# Patient Record
Sex: MALE | Race: ASIAN | Employment: OTHER
[De-identification: names, ages, dates, MRNs, and addresses within clinical notes are randomized per-mention and may not be internally consistent; named-entity substitution may affect disease eponyms.]

---

## 2019-02-07 PROBLEM — M54.50 CHRONIC LEFT-SIDED LOW BACK PAIN WITHOUT SCIATICA: Status: ACTIVE | Noted: 2019-02-07

## 2019-02-07 PROBLEM — G43.909 MIGRAINE WITHOUT STATUS MIGRAINOSUS, NOT INTRACTABLE: Status: ACTIVE | Noted: 2019-02-07

## 2019-02-07 PROBLEM — M54.41 CHRONIC RIGHT-SIDED LOW BACK PAIN WITH RIGHT-SIDED SCIATICA: Status: ACTIVE | Noted: 2019-02-07

## 2019-02-07 PROBLEM — G89.29 CHRONIC RIGHT-SIDED LOW BACK PAIN WITH RIGHT-SIDED SCIATICA: Status: ACTIVE | Noted: 2019-02-07

## 2019-02-07 PROBLEM — F33.2 SEVERE EPISODE OF RECURRENT MAJOR DEPRESSIVE DISORDER, WITHOUT PSYCHOTIC FEATURES (HCC): Status: ACTIVE | Noted: 2019-02-07

## 2019-02-25 ENCOUNTER — HOSPITAL ENCOUNTER (OUTPATIENT)
Dept: MRI IMAGING | Facility: CLINIC | Age: 34
Discharge: HOME | End: 2019-02-27
Payer: OTHER GOVERNMENT

## 2019-02-25 DIAGNOSIS — G89.29 CHRONIC LEFT-SIDED LOW BACK PAIN WITHOUT SCIATICA: ICD-10-CM

## 2019-02-25 DIAGNOSIS — M54.41 CHRONIC RIGHT-SIDED LOW BACK PAIN WITH RIGHT-SIDED SCIATICA: ICD-10-CM

## 2019-02-25 DIAGNOSIS — M54.50 CHRONIC LEFT-SIDED LOW BACK PAIN WITHOUT SCIATICA: ICD-10-CM

## 2019-02-25 DIAGNOSIS — M54.16 LUMBAR RADICULOPATHY, RIGHT: ICD-10-CM

## 2019-02-25 DIAGNOSIS — G89.29 CHRONIC RIGHT-SIDED LOW BACK PAIN WITH RIGHT-SIDED SCIATICA: ICD-10-CM

## 2019-02-25 DIAGNOSIS — G43.909 MIGRAINE WITHOUT STATUS MIGRAINOSUS, NOT INTRACTABLE, UNSPECIFIED MIGRAINE TYPE: ICD-10-CM

## 2019-02-25 PROCEDURE — 72148 MRI LUMBAR SPINE W/O DYE: CPT

## 2019-02-25 PROCEDURE — 70551 MRI BRAIN STEM W/O DYE: CPT

## 2019-03-04 PROBLEM — E66.3 OVERWEIGHT (BMI 25.0-29.9): Status: ACTIVE | Noted: 2019-03-04

## 2019-04-22 DIAGNOSIS — M25.562 ACUTE PAIN OF BOTH KNEES: Primary | ICD-10-CM

## 2019-04-22 DIAGNOSIS — M25.561 ACUTE PAIN OF BOTH KNEES: Primary | ICD-10-CM

## 2019-04-23 ENCOUNTER — OFFICE VISIT (OUTPATIENT)
Dept: ORTHOPEDIC SURGERY | Age: 34
End: 2019-04-23
Payer: OTHER GOVERNMENT

## 2019-04-23 DIAGNOSIS — M25.562 CHRONIC PATELLOFEMORAL PAIN OF BOTH KNEES: Primary | ICD-10-CM

## 2019-04-23 DIAGNOSIS — G89.29 CHRONIC PATELLOFEMORAL PAIN OF BOTH KNEES: Primary | ICD-10-CM

## 2019-04-23 DIAGNOSIS — M25.561 CHRONIC PATELLOFEMORAL PAIN OF BOTH KNEES: Primary | ICD-10-CM

## 2019-04-23 PROCEDURE — 99203 OFFICE O/P NEW LOW 30 MIN: CPT | Performed by: ORTHOPAEDIC SURGERY
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
## 2019-06-19 ENCOUNTER — APPOINTMENT (OUTPATIENT)
Dept: GENERAL RADIOLOGY | Age: 34
DRG: 918 | End: 2019-06-19
Payer: OTHER GOVERNMENT

## 2019-06-19 ENCOUNTER — APPOINTMENT (OUTPATIENT)
Dept: CT IMAGING | Age: 34
DRG: 918 | End: 2019-06-19
Payer: OTHER GOVERNMENT

## 2019-06-19 ENCOUNTER — HOSPITAL ENCOUNTER (INPATIENT)
Age: 34
LOS: 5 days | Discharge: PSYCHIATRIC OR SUBSTANCE USE FACILITY | DRG: 918 | End: 2019-06-24
Attending: EMERGENCY MEDICINE | Admitting: FAMILY MEDICINE
Payer: OTHER GOVERNMENT

## 2019-06-19 DIAGNOSIS — I47.1 SVT (SUPRAVENTRICULAR TACHYCARDIA) (HCC): ICD-10-CM

## 2019-06-19 DIAGNOSIS — G25.79 SEROTONIN SYNDROME: Primary | ICD-10-CM

## 2019-06-19 PROBLEM — G90.81 SEROTONIN SYNDROME: Status: ACTIVE | Noted: 2019-06-19

## 2019-06-19 LAB
ABSOLUTE EOS #: 0.1 K/UL (ref 0–0.4)
ABSOLUTE IMMATURE GRANULOCYTE: ABNORMAL K/UL (ref 0–0.3)
ABSOLUTE LYMPH #: 2.7 K/UL (ref 1–4.8)
ABSOLUTE MONO #: 0.6 K/UL (ref 0.1–1.3)
ACETAMINOPHEN LEVEL: <5 UG/ML (ref 10–30)
ALBUMIN SERPL-MCNC: 4.7 G/DL (ref 3.5–5.2)
ALBUMIN/GLOBULIN RATIO: NORMAL (ref 1–2.5)
ALLEN TEST: ABNORMAL
ALP BLD-CCNC: 51 U/L (ref 40–129)
ALT SERPL-CCNC: 38 U/L (ref 5–41)
AMPHETAMINE SCREEN URINE: NEGATIVE
ANION GAP SERPL CALCULATED.3IONS-SCNC: 15 MMOL/L (ref 9–17)
AST SERPL-CCNC: 27 U/L
BARBITURATE SCREEN URINE: NEGATIVE
BASOPHILS # BLD: 1 % (ref 0–2)
BASOPHILS ABSOLUTE: 0 K/UL (ref 0–0.2)
BENZODIAZEPINE SCREEN, URINE: NEGATIVE
BILIRUB SERPL-MCNC: 0.65 MG/DL (ref 0.3–1.2)
BILIRUBIN URINE: NEGATIVE
BUN BLDV-MCNC: 11 MG/DL (ref 6–20)
BUN/CREAT BLD: NORMAL (ref 9–20)
BUPRENORPHINE URINE: NORMAL
CALCIUM SERPL-MCNC: 9.5 MG/DL (ref 8.6–10.4)
CANNABINOID SCREEN URINE: NEGATIVE
CARBOXYHEMOGLOBIN: 0.7 % (ref 0–5)
CHLORIDE BLD-SCNC: 104 MMOL/L (ref 98–107)
CO2: 22 MMOL/L (ref 20–31)
COCAINE METABOLITE, URINE: NEGATIVE
COLOR: YELLOW
COMMENT UA: NORMAL
CREAT SERPL-MCNC: 0.72 MG/DL (ref 0.7–1.2)
DIFFERENTIAL TYPE: ABNORMAL
EOSINOPHILS RELATIVE PERCENT: 2 % (ref 0–4)
ETHANOL PERCENT: <0.01 %
ETHANOL: <10 MG/DL
FIO2: ABNORMAL
GFR AFRICAN AMERICAN: >60 ML/MIN
GFR NON-AFRICAN AMERICAN: >60 ML/MIN
GFR SERPL CREATININE-BSD FRML MDRD: NORMAL ML/MIN/{1.73_M2}
GFR SERPL CREATININE-BSD FRML MDRD: NORMAL ML/MIN/{1.73_M2}
GLUCOSE BLD-MCNC: 90 MG/DL (ref 70–99)
GLUCOSE URINE: NEGATIVE
HCO3 ARTERIAL: 22.8 MMOL/L (ref 22–26)
HCT VFR BLD CALC: 47.5 % (ref 41–53)
HEMOGLOBIN: 16.4 G/DL (ref 13.5–17.5)
IMMATURE GRANULOCYTES: ABNORMAL %
INR BLD: 0.9
KETONES, URINE: NEGATIVE
LACTIC ACID: 2.3 MMOL/L (ref 0.5–2.2)
LEUKOCYTE ESTERASE, URINE: NEGATIVE
LYMPHOCYTES # BLD: 37 % (ref 24–44)
MCH RBC QN AUTO: 29.4 PG (ref 26–34)
MCHC RBC AUTO-ENTMCNC: 34.4 G/DL (ref 31–37)
MCV RBC AUTO: 85.3 FL (ref 80–100)
MDMA URINE: NORMAL
METHADONE SCREEN, URINE: NEGATIVE
METHAMPHETAMINE, URINE: NORMAL
METHEMOGLOBIN: 0.6 % (ref 0–1.9)
MODE: ABNORMAL
MONOCYTES # BLD: 8 % (ref 1–7)
NEGATIVE BASE EXCESS, ART: 2.1 MMOL/L (ref 0–2)
NITRITE, URINE: NEGATIVE
NOTIFICATION TIME: ABNORMAL
NOTIFICATION: ABNORMAL
NRBC AUTOMATED: ABNORMAL PER 100 WBC
O2 DEVICE/FLOW/%: ABNORMAL
O2 SAT, ARTERIAL: 96.5 % (ref 95–98)
OPIATES, URINE: NEGATIVE
OXYCODONE SCREEN URINE: NEGATIVE
OXYHEMOGLOBIN: ABNORMAL % (ref 95–98)
PARTIAL THROMBOPLASTIN TIME: 27.6 SEC (ref 24–36)
PATIENT TEMP: ABNORMAL
PCO2 ARTERIAL: 37.5 MMHG (ref 35–45)
PCO2, ART, TEMP ADJ: ABNORMAL (ref 35–45)
PDW BLD-RTO: 12.7 % (ref 11.5–14.9)
PEEP/CPAP: ABNORMAL
PH ARTERIAL: 7.39 (ref 7.35–7.45)
PH UA: 7.5 (ref 5–8)
PH, ART, TEMP ADJ: ABNORMAL (ref 7.35–7.45)
PHENCYCLIDINE, URINE: NEGATIVE
PLATELET # BLD: 294 K/UL (ref 150–450)
PLATELET ESTIMATE: ABNORMAL
PMV BLD AUTO: 8.1 FL (ref 6–12)
PO2 ARTERIAL: 88.7 MMHG (ref 80–100)
PO2, ART, TEMP ADJ: ABNORMAL MMHG (ref 80–100)
POSITIVE BASE EXCESS, ART: ABNORMAL MMOL/L (ref 0–2)
POTASSIUM SERPL-SCNC: 3.9 MMOL/L (ref 3.7–5.3)
PROPOXYPHENE, URINE: NORMAL
PROTEIN UA: NEGATIVE
PROTHROMBIN TIME: 12.6 SEC (ref 11.8–14.6)
PSV: ABNORMAL
PT. POSITION: ABNORMAL
RBC # BLD: 5.57 M/UL (ref 4.5–5.9)
RBC # BLD: ABNORMAL 10*6/UL
RESPIRATORY RATE: 18
SALICYLATE LEVEL: <1 MG/DL (ref 3–10)
SAMPLE SITE: ABNORMAL
SEG NEUTROPHILS: 52 % (ref 36–66)
SEGMENTED NEUTROPHILS ABSOLUTE COUNT: 3.9 K/UL (ref 1.3–9.1)
SET RATE: ABNORMAL
SODIUM BLD-SCNC: 141 MMOL/L (ref 135–144)
SPECIFIC GRAVITY UA: 1 (ref 1–1.03)
TEST INFORMATION: NORMAL
TEXT FOR RESPIRATORY: ABNORMAL
TOTAL CK: 110 U/L (ref 39–308)
TOTAL HB: ABNORMAL G/DL (ref 12–16)
TOTAL PROTEIN: 7.8 G/DL (ref 6.4–8.3)
TOTAL RATE: ABNORMAL
TOXIC TRICYCLIC SC,BLOOD: ABNORMAL
TRICYCLIC ANTIDEPRESSANTS, UR: NORMAL
TROPONIN INTERP: NORMAL
TROPONIN T: NORMAL NG/ML
TROPONIN, HIGH SENSITIVITY: <6 NG/L (ref 0–22)
TSH SERPL DL<=0.05 MIU/L-ACNC: 1.67 MIU/L (ref 0.3–5)
TURBIDITY: CLEAR
URINE HGB: NEGATIVE
UROBILINOGEN, URINE: NORMAL
VT: ABNORMAL
WBC # BLD: 7.3 K/UL (ref 3.5–11)
WBC # BLD: ABNORMAL 10*3/UL

## 2019-06-19 PROCEDURE — 85025 COMPLETE CBC W/AUTO DIFF WBC: CPT

## 2019-06-19 PROCEDURE — 83735 ASSAY OF MAGNESIUM: CPT

## 2019-06-19 PROCEDURE — 80307 DRUG TEST PRSMV CHEM ANLYZR: CPT

## 2019-06-19 PROCEDURE — 96365 THER/PROPH/DIAG IV INF INIT: CPT

## 2019-06-19 PROCEDURE — 85610 PROTHROMBIN TIME: CPT

## 2019-06-19 PROCEDURE — 93005 ELECTROCARDIOGRAM TRACING: CPT | Performed by: EMERGENCY MEDICINE

## 2019-06-19 PROCEDURE — 2500000003 HC RX 250 WO HCPCS: Performed by: FAMILY MEDICINE

## 2019-06-19 PROCEDURE — 84484 ASSAY OF TROPONIN QUANT: CPT

## 2019-06-19 PROCEDURE — G0480 DRUG TEST DEF 1-7 CLASSES: HCPCS

## 2019-06-19 PROCEDURE — 2580000003 HC RX 258: Performed by: EMERGENCY MEDICINE

## 2019-06-19 PROCEDURE — 36415 COLL VENOUS BLD VENIPUNCTURE: CPT

## 2019-06-19 PROCEDURE — 82805 BLOOD GASES W/O2 SATURATION: CPT

## 2019-06-19 PROCEDURE — 2060000000 HC ICU INTERMEDIATE R&B

## 2019-06-19 PROCEDURE — 71045 X-RAY EXAM CHEST 1 VIEW: CPT

## 2019-06-19 PROCEDURE — 84443 ASSAY THYROID STIM HORMONE: CPT

## 2019-06-19 PROCEDURE — 96375 TX/PRO/DX INJ NEW DRUG ADDON: CPT

## 2019-06-19 PROCEDURE — 36600 WITHDRAWAL OF ARTERIAL BLOOD: CPT

## 2019-06-19 PROCEDURE — 85730 THROMBOPLASTIN TIME PARTIAL: CPT

## 2019-06-19 PROCEDURE — 2000000000 HC ICU R&B

## 2019-06-19 PROCEDURE — 81003 URINALYSIS AUTO W/O SCOPE: CPT

## 2019-06-19 PROCEDURE — 99285 EMERGENCY DEPT VISIT HI MDM: CPT

## 2019-06-19 PROCEDURE — 82550 ASSAY OF CK (CPK): CPT

## 2019-06-19 PROCEDURE — 6360000002 HC RX W HCPCS: Performed by: EMERGENCY MEDICINE

## 2019-06-19 PROCEDURE — 80053 COMPREHEN METABOLIC PANEL: CPT

## 2019-06-19 PROCEDURE — 70450 CT HEAD/BRAIN W/O DYE: CPT

## 2019-06-19 PROCEDURE — 2580000003 HC RX 258: Performed by: FAMILY MEDICINE

## 2019-06-19 PROCEDURE — 83605 ASSAY OF LACTIC ACID: CPT

## 2019-06-19 RX ORDER — SODIUM CHLORIDE 9 MG/ML
INJECTION, SOLUTION INTRAVENOUS CONTINUOUS
Status: DISCONTINUED | OUTPATIENT
Start: 2019-06-19 | End: 2019-06-24 | Stop reason: HOSPADM

## 2019-06-19 RX ORDER — CLONIDINE HYDROCHLORIDE 0.1 MG/1
0.1 TABLET ORAL NIGHTLY PRN
Status: DISCONTINUED | OUTPATIENT
Start: 2019-06-19 | End: 2019-06-24 | Stop reason: HOSPADM

## 2019-06-19 RX ORDER — DIPHENHYDRAMINE HCL 25 MG
25 TABLET ORAL EVERY 6 HOURS PRN
Status: ON HOLD | COMMUNITY
End: 2019-06-24 | Stop reason: HOSPADM

## 2019-06-19 RX ORDER — SODIUM CHLORIDE 0.9 % (FLUSH) 0.9 %
10 SYRINGE (ML) INJECTION PRN
Status: DISCONTINUED | OUTPATIENT
Start: 2019-06-19 | End: 2019-06-24 | Stop reason: HOSPADM

## 2019-06-19 RX ORDER — CLONIDINE HYDROCHLORIDE 0.1 MG/1
0.1 TABLET ORAL NIGHTLY PRN
Status: ON HOLD | COMMUNITY
End: 2019-06-27 | Stop reason: SDUPTHER

## 2019-06-19 RX ORDER — GABAPENTIN 600 MG/1
600 TABLET ORAL 3 TIMES DAILY
Status: DISCONTINUED | OUTPATIENT
Start: 2019-06-19 | End: 2019-06-24 | Stop reason: HOSPADM

## 2019-06-19 RX ORDER — LORAZEPAM 2 MG/ML
1.5 INJECTION INTRAMUSCULAR ONCE
Status: COMPLETED | OUTPATIENT
Start: 2019-06-19 | End: 2019-06-19

## 2019-06-19 RX ORDER — MAGNESIUM SULFATE 1 G/100ML
1 INJECTION INTRAVENOUS
Status: COMPLETED | OUTPATIENT
Start: 2019-06-19 | End: 2019-06-19

## 2019-06-19 RX ORDER — LORAZEPAM 2 MG/ML
1 INJECTION INTRAMUSCULAR ONCE
Status: COMPLETED | OUTPATIENT
Start: 2019-06-19 | End: 2019-06-19

## 2019-06-19 RX ORDER — ONDANSETRON 2 MG/ML
4 INJECTION INTRAMUSCULAR; INTRAVENOUS EVERY 6 HOURS PRN
Status: DISCONTINUED | OUTPATIENT
Start: 2019-06-19 | End: 2019-06-24 | Stop reason: HOSPADM

## 2019-06-19 RX ORDER — SODIUM CHLORIDE 0.9 % (FLUSH) 0.9 %
10 SYRINGE (ML) INJECTION EVERY 12 HOURS SCHEDULED
Status: DISCONTINUED | OUTPATIENT
Start: 2019-06-19 | End: 2019-06-24 | Stop reason: HOSPADM

## 2019-06-19 RX ORDER — SUMATRIPTAN 100 MG/1
100 TABLET, FILM COATED ORAL
Status: DISPENSED | OUTPATIENT
Start: 2019-06-19 | End: 2019-06-19

## 2019-06-19 RX ORDER — 0.9 % SODIUM CHLORIDE 0.9 %
2000 INTRAVENOUS SOLUTION INTRAVENOUS ONCE
Status: COMPLETED | OUTPATIENT
Start: 2019-06-19 | End: 2019-06-19

## 2019-06-19 RX ORDER — LORAZEPAM 2 MG/ML
0.5 INJECTION INTRAMUSCULAR EVERY 6 HOURS PRN
Status: DISCONTINUED | OUTPATIENT
Start: 2019-06-19 | End: 2019-06-24 | Stop reason: HOSPADM

## 2019-06-19 RX ADMIN — Medication 10 ML: at 23:13

## 2019-06-19 RX ADMIN — SODIUM CHLORIDE: 9 INJECTION, SOLUTION INTRAVENOUS at 23:07

## 2019-06-19 RX ADMIN — LORAZEPAM 0.5 MG: 2 INJECTION INTRAMUSCULAR; INTRAVENOUS at 18:35

## 2019-06-19 RX ADMIN — LORAZEPAM 1 MG: 2 INJECTION INTRAMUSCULAR; INTRAVENOUS at 20:16

## 2019-06-19 RX ADMIN — LORAZEPAM 1.5 MG: 2 INJECTION INTRAMUSCULAR; INTRAVENOUS at 18:48

## 2019-06-19 RX ADMIN — MAGNESIUM SULFATE HEPTAHYDRATE 1 G: 1 INJECTION, SOLUTION INTRAVENOUS at 20:07

## 2019-06-19 RX ADMIN — MAGNESIUM SULFATE HEPTAHYDRATE 1 G: 1 INJECTION, SOLUTION INTRAVENOUS at 18:50

## 2019-06-19 RX ADMIN — SODIUM CHLORIDE 2000 ML: 9 INJECTION, SOLUTION INTRAVENOUS at 18:41

## 2019-06-19 RX ADMIN — DEXMEDETOMIDINE 0.2 MCG/KG/HR: 100 INJECTION, SOLUTION, CONCENTRATE INTRAVENOUS at 23:07

## 2019-06-19 ASSESSMENT — PAIN SCALES - GENERAL: PAINLEVEL_OUTOF10: 0

## 2019-06-19 NOTE — PROGRESS NOTES
Pharmacy Note:    Poison Control was contacted regarding suspected overdose on duloxetine. Patient reported having taken this medication, but could not provide a quantity that he took. He had an empty bottle of 60 capsules of duloxetine 60 mg with him upon arrival. The medication vial is over a month old (filled 5/7/19), however all of the other vials which were full or partially full were filled the same date or earlier, indicating some level of non-adherence. Poison Control suggests leaving the patient on telemetry, replacing electrolytes, starting with magnesium, given the patient's long QTc. They also suggested lorazepam to manage tachycardia, clonus, and seizures. They suggest avoiding medications which may prolong the QT interval. Du Berman RN is the nurse from [facility] on this case. She will call back in 2 hours.     Thank you,  Nicky Bonilla, PharmD  [phone]

## 2019-06-19 NOTE — PROGRESS NOTES
Medication History completed:    New medications: diphenhydramine, clonidine    Medications discontinued: mirtazapine, trazodone    Changes to dosing: duloxetine dose confirmed as 60 mg twice daily    Stated allergies: NKDA    Other pertinent information: Medications confirmed with prescription vials and [facility]. His fill history suggests non-adherence to medications.      Thank you,  Ileana Goetz PharmD  [phone]

## 2019-06-19 NOTE — ED PROVIDER NOTES
light. Right eye exhibits no discharge. Left eye exhibits no discharge. Neck: No tracheal deviation present. Cardiovascular: Regular rhythm and normal heart sounds. tachycardic   Pulmonary/Chest: Effort normal and breath sounds normal. No stridor. No respiratory distress. He has no wheezes. He has no rales. Abdominal: Soft. Bowel sounds are normal. He exhibits no distension. There is no tenderness. There is no guarding. Musculoskeletal: Normal range of motion. He exhibits no deformity. Neurological: He is disoriented. Awake. Responding to internal stimuli, mumbling nonsensical words. Patient does follow commands. SCOTT. Patient does not have muscular rigidity. Patient has marked clonus of b/l LE   Skin: Skin is warm and dry. He is not diaphoretic.    Psychiatric:   Unable to assess 2/2 ams       WORK-UP     PLAN (LABS / IMAGING /EKG):  Orders Placed This Encounter   Procedures    CT head without contrast    XR CHEST PORTABLE    CBC Auto Differential    Comprehensive Metabolic Panel w/ Reflex to MG    Troponin    Protime-INR    APTT    TSH without Reflex    Lactic Acid    Urinalysis Reflex to Culture    TOX SCR, BLD, ED    Urine Drug Screen    Arterial Blood Gases    CK    Telemetry monitoring    Inpatient consult to Primary Care Provider    EKG 12 Lead    EKG 12 Lead    Saline lock IV    PATIENT STATUS (FROM ED OR OR/PROCEDURAL) Inpatient       MEDICATIONS ORDERED:  Orders Placed This Encounter   Medications    LORazepam (ATIVAN) injection 0.5 mg    0.9 % sodium chloride bolus    magnesium sulfate 1 g in dextrose 5% 100 mL IVPB    LORazepam (ATIVAN) injection 1.5 mg    LORazepam (ATIVAN) injection 1 mg    0.9 % sodium chloride infusion       DIAGNOSTIC RESULTS / EMERGENCY DEPARTMENT COURSE /MDM / DIFFERENTIAL DIAGNOSIS     LABS:  Results for orders placed or performed during the hospital encounter of 06/19/19   CBC Auto Differential   Result Value Ref Range    WBC 7.3 3.5 - 11.0 k/uL    RBC 5.57 4.5 - 5.9 m/uL    Hemoglobin 16.4 13.5 - 17.5 g/dL    Hematocrit 47.5 41 - 53 %    MCV 85.3 80 - 100 fL    MCH 29.4 26 - 34 pg    MCHC 34.4 31 - 37 g/dL    RDW 12.7 11.5 - 14.9 %    Platelets 604 656 - 306 k/uL    MPV 8.1 6.0 - 12.0 fL    NRBC Automated NOT REPORTED per 100 WBC    Differential Type NOT REPORTED     Seg Neutrophils 52 36 - 66 %    Lymphocytes 37 24 - 44 %    Monocytes 8 (H) 1 - 7 %    Eosinophils % 2 0 - 4 %    Basophils 1 0 - 2 %    Immature Granulocytes NOT REPORTED 0 %    Segs Absolute 3.90 1.3 - 9.1 k/uL    Absolute Lymph # 2.70 1.0 - 4.8 k/uL    Absolute Mono # 0.60 0.1 - 1.3 k/uL    Absolute Eos # 0.10 0.0 - 0.4 k/uL    Basophils # 0.00 0.0 - 0.2 k/uL    Absolute Immature Granulocyte NOT REPORTED 0.00 - 0.30 k/uL    WBC Morphology NOT REPORTED     RBC Morphology NOT REPORTED     Platelet Estimate NOT REPORTED    Comprehensive Metabolic Panel w/ Reflex to MG   Result Value Ref Range    Glucose 90 70 - 99 mg/dL    BUN 11 6 - 20 mg/dL    CREATININE 0.72 0.70 - 1.20 mg/dL    Bun/Cre Ratio NOT REPORTED 9 - 20    Calcium 9.5 8.6 - 10.4 mg/dL    Sodium 141 135 - 144 mmol/L    Potassium 3.9 3.7 - 5.3 mmol/L    Chloride 104 98 - 107 mmol/L    CO2 22 20 - 31 mmol/L    Anion Gap 15 9 - 17 mmol/L    Alkaline Phosphatase 51 40 - 129 U/L    ALT 38 5 - 41 U/L    AST 27 <40 U/L    Total Bilirubin 0.65 0.3 - 1.2 mg/dL    Total Protein 7.8 6.4 - 8.3 g/dL    Alb 4.7 3.5 - 5.2 g/dL    Albumin/Globulin Ratio NOT REPORTED 1.0 - 2.5    GFR Non-African American >60 >60 mL/min    GFR African American >60 >60 mL/min    GFR Comment          GFR Staging NOT REPORTED    Troponin   Result Value Ref Range    Troponin, High Sensitivity <6 0 - 22 ng/L    Troponin T NOT REPORTED <0.03 ng/mL    Troponin Interp NOT REPORTED    Protime-INR   Result Value Ref Range    Protime 12.6 11.8 - 14.6 sec    INR 0.9    APTT   Result Value Ref Range    PTT 27.6 24.0 - 36.0 sec   TSH without Reflex   Result Value Ref Range TSH 1.67 0.30 - 5.00 mIU/L   Lactic Acid   Result Value Ref Range    Lactic Acid 2.3 (H) 0.5 - 2.2 mmol/L   TOX SCR, BLD, ED   Result Value Ref Range    Ethanol <10 <10 mg/dL    Ethanol percent <8.756 %    Salicylate Lvl <1 (L) 3 - 10 mg/dL    Acetaminophen Level <5 (L) 10 - 30 ug/mL    Toxic Tricyclic Sc,Blood WRONG TEST ORDERED SEE UTAD NEGATIVE   CK   Result Value Ref Range    Total  39 - 308 U/L       RADIOLOGY:  Ct Head Without Contrast    Result Date: 6/19/2019  EXAMINATION: CT OF THE HEAD WITHOUT CONTRAST  6/19/2019 7:19 pm TECHNIQUE: CT of the head was performed without the administration of intravenous contrast. Dose modulation, iterative reconstruction, and/or weight based adjustment of the mA/kV was utilized to reduce the radiation dose to as low as reasonably achievable. COMPARISON: None. HISTORY: ORDERING SYSTEM PROVIDED HISTORY: ams work up TECHNOLOGIST PROVIDED HISTORY: AMS work up Ordering Physician Provided Reason for Exam: ams work up Acuity: Unknown Type of Exam: Unknown Additional signs and symptoms: PT IS POSSIBLE OVERDOSE ON PSYCH MEDS FINDINGS: BRAIN/VENTRICLES: Numerous images are limited due to artifact. Ventricles are normal in size and position. Sulci are prominent for the patient's age. Detail of the vertex is markedly limited. Low density projects over the lower estelle. This is likely artifact. There is no hemorrhage or extra-axial collection. ORBITS: No acute orbital abnormality is noted SINUSES: No air-fluid levels are noted SOFT TISSUES/SKULL:  No acute abnormality of the visualized skull or soft tissues. Limited exam due to artifact No acute intracranial abnormality. Xr Chest Portable    Result Date: 6/19/2019  EXAMINATION: ONE XRAY VIEW OF THE CHEST 6/19/2019 6:48 pm COMPARISON: 09/04/2008 HISTORY: Ordering Physician Provided Reason for Exam: AMS, poor historian Acuity: Acute Type of Exam: Initial FINDINGS: The lungs are without acute focal process.   No effusion or pneumothorax. Mild widening of the mediastinum. Mild rightward tracheal deviation. Normal heart size. The osseous structures are without acute process. 1. Mild mediastinal widening which may in part relate to portable AP technique. 2. Slight rightward tracheal deviation. 3. No focal airspace disease or edema. RECOMMENDATIONS: Recommend dedicated PA and lateral views for better assessment or alternatively further assessment with contrast-enhanced chest CT as clinically warranted. EKG  Rhythm: SVT  Rate: tachycardia  Axis: right  Ectopy: none  Conduction: QTc prolongation (538)  ST Segments: normal  T Waves: non specific changes  Q Waves: III and aVr    Clinical Impression: SVT    Dearl DO Edda     All EKG's are interpreted by the Emergency Department Physician who either signs or Co-signs this chart in the absence of a cardiologist.    DIFFERENTIAL DIAGNOSIS:  Serotonin syndrome, overdose on duloxetine or other home medication, trauma, electrolyte abnormality, rhabdomyolysis, thyroid storm    EMERGENCY DEPARTMENT COURSE & MDM:  34 y.o. male presents with a chief complaint of AMS and suspected overdose. Vitals notable for HTN and tachycardia. Patient is awake, following commands, but not answering questions appropriately. Patient appears to be responding to internal stimuli and mumbling. Given clinical picture, and that patient's duloxetine bottle is empty, suspect serotonin syndrome. Will treat with fluids and benzos. No external sign of trauma. Will get AMS work up. Patient is protecting his airway; no need for intubation at this time. Patient is in SVT, however given that his BP is stable will not give adenosine or cardiovert at this time. Will fluid resuscitate and reassess.     ED Course as of Jun 19 2055 Wed Jun 19, 2019   1832 Will order 2g of magnesium for prolonged QT interval    [BJ]   1906 Elevated lactic acid - fluids are running   Lactic Acid(!): 2.3 [BJ]   1906 Coags WNL    [BJ]   1907 No anemia or leukocytosis noted on CBC. [BJ]   1907 Electrolytes are within normal limits and without need for acute correction or intervention. Glucose level is stable; neither severe hyper- or hypo-glycemia is present to require immediate correction. Kidney function is normal; no SHIRA or CKD. The patient's liver function studies are within normal limits therefore doubt acute liver injury or process. [BJ]   1907 The patient's troponin is <6. EKG non-diagnostic for STEMI. Using the high sensitivity troponin flow sheet, will order another troponin value in 1 hour to further assess for ACS. Troponin:    Troponin, High Sensitivity <6   Troponin T NOT REPORTED   Troponin Interp NOT REPORTED [BJ]   1907 TSH wnl, doubt thyroid storm   TSH: 1.67 [BJ]   1950 CT head without acute process   CT head without contrast [BJ]   1953 On reassessment patient is still responding to internal stimuli and not answering questions appropriately. Will admit to ICU at this time. [BJ]   2007 My attending physician spoke with Dr. Dave Hankins who has accepted care of the patient for admission to the ICU    [BJ]   2023 ED tox screen negative    [BJ]   2054 No rhabdomyolysis    Total CK: 110 [BJ]      ED Course User Index  [BJ] Kevyn Echeverria DO     PROCEDURES:  None    CONSULTS:  IP CONSULT TO PRIMARY CARE PROVIDER  IP CONSULT TO CRITICAL CARE  IP CONSULT TO PSYCHIATRY    CRITICAL CARE:  Please see attending physician note. FINAL IMPRESSION      1. Serotonin syndrome    2.  SVT (supraventricular tachycardia) ([facility] [address]          DISPOSITION / PLAN     DISPOSITION Admitted 06/19/2019 08:09:05 PM    PATIENT REFERRED TO:  Rogelio Zaman, [address]  [address] [facility] [address]  [phone]          DISCHARGE MEDICATIONS:  New Prescriptions    No medications on file       Kevyn Echeverria DO  Emergency Medicine Resident    (Please note that portions ofthis note were completed with a voice recognition program.  Efforts were made to edit the dictations but occasionally words are mis-transcribed.)        Juan Red DO  Resident  06/19/19 2055

## 2019-06-20 PROBLEM — E66.09 CLASS 1 OBESITY DUE TO EXCESS CALORIES WITHOUT SERIOUS COMORBIDITY WITH BODY MASS INDEX (BMI) OF 30.0 TO 30.9 IN ADULT: Status: ACTIVE | Noted: 2019-06-20

## 2019-06-20 PROBLEM — E66.811 CLASS 1 OBESITY DUE TO EXCESS CALORIES WITHOUT SERIOUS COMORBIDITY WITH BODY MASS INDEX (BMI) OF 30.0 TO 30.9 IN ADULT: Status: ACTIVE | Noted: 2019-06-20

## 2019-06-20 LAB
ANION GAP SERPL CALCULATED.3IONS-SCNC: 14 MMOL/L (ref 9–17)
BUN BLDV-MCNC: 7 MG/DL (ref 6–20)
BUN/CREAT BLD: NORMAL (ref 9–20)
CALCIUM SERPL-MCNC: 8.9 MG/DL (ref 8.6–10.4)
CHLORIDE BLD-SCNC: 107 MMOL/L (ref 98–107)
CO2: 21 MMOL/L (ref 20–31)
CREAT SERPL-MCNC: 0.7 MG/DL (ref 0.7–1.2)
EKG ATRIAL RATE: 160 BPM
EKG ATRIAL RATE: 91 BPM
EKG P AXIS: 39 DEGREES
EKG P-R INTERVAL: 194 MS
EKG Q-T INTERVAL: 330 MS
EKG Q-T INTERVAL: 386 MS
EKG QRS DURATION: 106 MS
EKG QRS DURATION: 122 MS
EKG QTC CALCULATION (BAZETT): 474 MS
EKG QTC CALCULATION (BAZETT): 538 MS
EKG R AXIS: -173 DEGREES
EKG R AXIS: 81 DEGREES
EKG T AXIS: 47 DEGREES
EKG T AXIS: 56 DEGREES
EKG VENTRICULAR RATE: 160 BPM
EKG VENTRICULAR RATE: 91 BPM
GFR AFRICAN AMERICAN: >60 ML/MIN
GFR NON-AFRICAN AMERICAN: >60 ML/MIN
GFR SERPL CREATININE-BSD FRML MDRD: NORMAL ML/MIN/{1.73_M2}
GFR SERPL CREATININE-BSD FRML MDRD: NORMAL ML/MIN/{1.73_M2}
GLUCOSE BLD-MCNC: 98 MG/DL (ref 70–99)
LACTIC ACID, WHOLE BLOOD: ABNORMAL MMOL/L (ref 0.7–2.1)
LACTIC ACID: 2.9 MMOL/L (ref 0.5–2.2)
MAGNESIUM: 2 MG/DL (ref 1.6–2.6)
MAGNESIUM: 2.2 MG/DL (ref 1.6–2.6)
MYOGLOBIN: 1558 NG/ML (ref 28–72)
POTASSIUM SERPL-SCNC: 4.3 MMOL/L (ref 3.7–5.3)
SODIUM BLD-SCNC: 142 MMOL/L (ref 135–144)
TOTAL CK: 7200 U/L (ref 39–308)

## 2019-06-20 PROCEDURE — 80048 BASIC METABOLIC PNL TOTAL CA: CPT

## 2019-06-20 PROCEDURE — 2500000003 HC RX 250 WO HCPCS: Performed by: FAMILY MEDICINE

## 2019-06-20 PROCEDURE — 36415 COLL VENOUS BLD VENIPUNCTURE: CPT

## 2019-06-20 PROCEDURE — 83735 ASSAY OF MAGNESIUM: CPT

## 2019-06-20 PROCEDURE — 99223 1ST HOSP IP/OBS HIGH 75: CPT | Performed by: FAMILY MEDICINE

## 2019-06-20 PROCEDURE — 93005 ELECTROCARDIOGRAM TRACING: CPT | Performed by: FAMILY MEDICINE

## 2019-06-20 PROCEDURE — 6370000000 HC RX 637 (ALT 250 FOR IP): Performed by: FAMILY MEDICINE

## 2019-06-20 PROCEDURE — 2000000000 HC ICU R&B

## 2019-06-20 PROCEDURE — 93010 ELECTROCARDIOGRAM REPORT: CPT | Performed by: INTERNAL MEDICINE

## 2019-06-20 PROCEDURE — 82550 ASSAY OF CK (CPK): CPT

## 2019-06-20 PROCEDURE — 6360000002 HC RX W HCPCS: Performed by: FAMILY MEDICINE

## 2019-06-20 PROCEDURE — 83874 ASSAY OF MYOGLOBIN: CPT

## 2019-06-20 PROCEDURE — 2580000003 HC RX 258: Performed by: FAMILY MEDICINE

## 2019-06-20 RX ORDER — NICOTINE 21 MG/24HR
1 PATCH, TRANSDERMAL 24 HOURS TRANSDERMAL DAILY
Status: DISCONTINUED | OUTPATIENT
Start: 2019-06-20 | End: 2019-06-24 | Stop reason: HOSPADM

## 2019-06-20 RX ORDER — LORAZEPAM 2 MG/ML
2 INJECTION INTRAMUSCULAR
Status: DISCONTINUED | OUTPATIENT
Start: 2019-06-20 | End: 2019-06-24 | Stop reason: HOSPADM

## 2019-06-20 RX ORDER — LORAZEPAM 1 MG/1
4 TABLET ORAL
Status: DISCONTINUED | OUTPATIENT
Start: 2019-06-20 | End: 2019-06-24 | Stop reason: HOSPADM

## 2019-06-20 RX ORDER — LORAZEPAM 1 MG/1
3 TABLET ORAL
Status: DISCONTINUED | OUTPATIENT
Start: 2019-06-20 | End: 2019-06-24 | Stop reason: HOSPADM

## 2019-06-20 RX ORDER — LORAZEPAM 2 MG/ML
4 INJECTION INTRAMUSCULAR
Status: DISCONTINUED | OUTPATIENT
Start: 2019-06-20 | End: 2019-06-24 | Stop reason: HOSPADM

## 2019-06-20 RX ORDER — LORAZEPAM 2 MG/ML
1 INJECTION INTRAMUSCULAR
Status: DISCONTINUED | OUTPATIENT
Start: 2019-06-20 | End: 2019-06-24 | Stop reason: HOSPADM

## 2019-06-20 RX ORDER — LORAZEPAM 1 MG/1
1 TABLET ORAL
Status: DISCONTINUED | OUTPATIENT
Start: 2019-06-20 | End: 2019-06-24 | Stop reason: HOSPADM

## 2019-06-20 RX ORDER — LORAZEPAM 2 MG/ML
3 INJECTION INTRAMUSCULAR
Status: DISCONTINUED | OUTPATIENT
Start: 2019-06-20 | End: 2019-06-24 | Stop reason: HOSPADM

## 2019-06-20 RX ORDER — LORAZEPAM 1 MG/1
2 TABLET ORAL
Status: DISCONTINUED | OUTPATIENT
Start: 2019-06-20 | End: 2019-06-24 | Stop reason: HOSPADM

## 2019-06-20 RX ADMIN — GABAPENTIN 600 MG: 600 TABLET, FILM COATED ORAL at 21:05

## 2019-06-20 RX ADMIN — DEXMEDETOMIDINE 0.5 MCG/KG/HR: 100 INJECTION, SOLUTION, CONCENTRATE INTRAVENOUS at 09:26

## 2019-06-20 RX ADMIN — ENOXAPARIN SODIUM 30 MG: 30 INJECTION SUBCUTANEOUS at 21:05

## 2019-06-20 RX ADMIN — ENOXAPARIN SODIUM 30 MG: 30 INJECTION SUBCUTANEOUS at 09:46

## 2019-06-20 RX ADMIN — Medication 10 ML: at 21:05

## 2019-06-20 RX ADMIN — SODIUM CHLORIDE: 9 INJECTION, SOLUTION INTRAVENOUS at 06:18

## 2019-06-20 RX ADMIN — SODIUM CHLORIDE: 9 INJECTION, SOLUTION INTRAVENOUS at 14:26

## 2019-06-20 RX ADMIN — LORAZEPAM 2 MG: 2 INJECTION INTRAMUSCULAR; INTRAVENOUS at 00:14

## 2019-06-20 RX ADMIN — LORAZEPAM 2 MG: 2 INJECTION INTRAMUSCULAR; INTRAVENOUS at 01:46

## 2019-06-20 RX ADMIN — DEXMEDETOMIDINE 0.6 MCG/KG/HR: 100 INJECTION, SOLUTION, CONCENTRATE INTRAVENOUS at 04:33

## 2019-06-20 RX ADMIN — DEXMEDETOMIDINE 0.5 MCG/KG/HR: 100 INJECTION, SOLUTION, CONCENTRATE INTRAVENOUS at 19:24

## 2019-06-20 RX ADMIN — SODIUM CHLORIDE: 9 INJECTION, SOLUTION INTRAVENOUS at 22:04

## 2019-06-20 ASSESSMENT — PAIN SCALES - GENERAL
PAINLEVEL_OUTOF10: 0
PAINLEVEL_OUTOF10: 0

## 2019-06-20 NOTE — PROGRESS NOTES
· Bronchodilator assessment         Bronchodilator assessment at level:   BRONCHODILATOR ASSESSMENT SCORE  Score 1 2 3 4   Breath Sounds   [x]  Clear []  Mild Wheezing with good aeration []  Moderate I/E wheezing with adequate aeration []  Poor Aeration or diffuse wheezing   Respiratory Rate []  Less than 20 [x]  20-25 []  Greater than 25  []  Greater than 35    Dyspnea [x]  No SOB  []  SOB with minimal activity []  Speaking in partial sentences []  Acute/ At rest   Peakflow (asthma) []  80 % or greater predicted/PB  []  Unable []  70% or greater predicted/PB  []  Unable []  51%-70% predicted/PB  []  Unable []  Less than 50% predicted/PB  []  Unable due to distress   FEV1 % Predicted []  Greater than 69%  []  Unable  []  Less than 50%-69%  []  Unable  []  Less than 35%-49%  []  Unable  []  Less than 35%  []  Unable due to distress     Pt tachypneic possibly due to the drug OD. Pt has no pulmonary history and no indication for pulmonary medications at this time.
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
## 2019-06-20 NOTE — FLOWSHEET NOTE
SC visit with patient, his sister and best friend; patient received  pin during [facility] visit;      06/20/19 1233   Encounter Summary   Services provided to: Patient and family together   Referral/Consult From: [address] [place] [address] Family members;Friends/neighbors   Continue Visiting   (6/20/19)   Complexity of Encounter Moderate   Length of Encounter 15 minutes   Spiritual Assessment Completed Yes   Spiritual/Hinduism   Type Spiritual support   Assessment Approachable; Hopeful;Coping   Intervention Active listening;Nurtured hope;Prayer;Sustaining presence/ Ministry of presence; Discussed illness/injury and it's impact   Outcome Expressed gratitude;Engaged in conversation; Hopeful;Receptive

## 2019-06-20 NOTE — CARE COORDINATION
CASE MANAGEMENT NOTE:    Admission Date:  6/19/2019 Lupe Shin is a 34 y.o.  male    Admitted for : Serotonin syndrome [G25.79]    Met with:  Family and Ro Langford (roommate)    PCP:  Nova Bell MD                              Insurance:  [facility]     Current Residence/ Living Arrangements:  independently at home with roommate Ro Langford          Current Services PTA:  No    Is patient agreeable to VNS: Pt confused at this time    Freedom of choice provided: NA    List of [address] provided: NA    VNS chosen:  NA    DME:  none    Home Oxygen: NA    Nebulizer: NA    CPAP/BIPAP: NA    Supplier: N/A    Potential Assistance Needed: No    SNF needed: No    Pharmacy:  [facility] [address]        Is the Patient an [employer] with Readmission Risk Score greater than 14%? No  If yes, pt needs a follow up appointment made within 7 days. Does Patient want to use MEDS to BEDS? No    Family Members/Caregivers that pt would like involved in their care:    Yes    If yes, list name here:  Stuart Gallardo (friend)     Transportation Provider:  Family             Is patient in Isolation/One on One/Altered Mental Status? Yes  If yes, skip next question. If no, would they like an I-Pad to  use? NA  If yes, call [phone]. Discharge Plan:  6/20/19 [facility]. DME-none. Pt lives at home with roommate Ro Langford who anticipates no needs at home. Pt confused from Serotonin overdose at this time. Will follow for evaluation for VNS or SNF. Pt may need Psych services when stable. KRISTA needs completed/signed. //pp               Electronically signed by: Marleny Luna RN on 6/20/2019 at 9:15 AM

## 2019-06-20 NOTE — PROGRESS NOTES
Patient experiencing some hallucinations for the first time this shift. Patient remained calm and cooperative, easily redirected.

## 2019-06-20 NOTE — ED PROVIDER NOTES
[address]  eMERGENCY dEPARTMENT eNCOUnter   Attending Attestation     Pt Name: Jigna Starr  MRN: [id number]  [place] 1985  Date of evaluation: 6/19/19    History, EXAM, MDM:    Jigna Starr is a 34 y.o. male who presents with Drug Overdose    Brother called EMS, found patient confused. Concern for drug overdose. Empty bottle of Cymbalta. Patient states that he overdosed on his Cymbalta, but doesn't say when or how much he took. On physical examination the patient's tachycardic, confused, he has tremors, clonus, mild muscular rigidity and hyperreflexia. Lungs are clear, abdomen is soft, skin is dry. Patient is alert, he is following commands, speech is slowed. Concern for serotonin syndrome, patient started on fluids, benzodiazepines, poison control consulted, they do not recommend cyproheptadine at this time. Additionally the patient had a bottle of Benadryl, possible: Congestion. Initial EKG shows a supraventricular tachycardia, heart rate of 160, temperature is 106, QTC of 538, no ST segment elevations, no depressions, no T-wave peaking, right superior axis deviation. Magnesium initiated    Repeat EKG shows improved heart rate at 132, , QRS of 110, QTC of 512 which is improved from 538 on the previous EKG. Rhythm is a sinus tachycardia, axis is rightward. Remainder of laboratory studies, CT scan of the head and chest x-ray unremarkable. Patient being admitted to the intensive care unit for treatment of serotonin syndrome, case was discussed with Dr. Consuelo Cabrera. Critical Care  Performed by: Caro Livingston MD  Authorized by:  Natalya Jin MD     Critical care provider statement:     Critical care time (minutes):  35    Critical care was necessary to treat or prevent imminent or life-threatening deterioration of the following conditions:  Toxidrome and CNS failure or compromise    Critical care was time spent personally by me on the following activities:  Ordering and review of radiographic studies, ordering and review of laboratory studies, pulse oximetry, re-evaluation of patient's condition, development of treatment plan with patient or surrogate, discussions with consultants, discussions with primary provider, evaluation of patient's response to treatment and examination of patient            Vitals:   Vitals:    06/19/19 1823 06/19/19 1900 06/19/19 1936   BP: (!) 152/97 (!) 146/108    Pulse: 164 138    Resp: 22  22   Temp: 98.2 °F (36.8 °C)     TempSrc: Oral     SpO2:  99% 99%   Weight: 232 lb (105.2 kg)     Height: 6' 1\" (1.854 m)       I performed a history and physical examination of the patient and discussed management with the resident. I reviewed the residents note and agree with the documented findings and plan of care. Any areas of disagreement are noted on the chart. I was personally present for the key portions of any procedures. I have documented in the chart those procedures where I was not present during the key portions. I have personally reviewed all images and agree with the resident's interpretation. I have reviewed the emergency nurses triage note. I agree with the chief complaint, past medical history, past surgical history, allergies, medications, social and family history as documented unless otherwise noted below. Documentation of the HPI, Physical Exam and Medical Decision Making performed by medical students or scribes is based on my personal performance of the HPI, PE and MDM. For Phys Assistant/ Nurse Practitioner cases/documentation I have had a face to face evaluation of this patient and have completed at least one if not all key elements of the E/M (history, physical exam, and MDM). Additional findings are as noted.     Jaylen Navarro MD  Attending Emergency  Physician          t     Jaylen Navarro MD  06/19/19 3737

## 2019-06-20 NOTE — FLOWSHEET NOTE
06/19/19 2241   Provider Notification   Reason for Communication New orders   Provider Name Dr Brien Paz   Provider Notification Physician   Method of Communication Call   Response See orders   Notification Time [id number]   Notified of consult. Informed of patient overdose, vital signs, meds, treatment on ER, etoh use, condition update.

## 2019-06-20 NOTE — CONSULTS
Consult note dictated:    Drug overdose/possible serotonin syndrome with duloxetine  Rhabdomyolysis  Hypoxia  Tobacco abuse x10 years, around 1 pack/day but now vaping and chewing  Alcohol abuse  Suicidal ideation/attempt, this is the second time according to sister  History of anxiety, bipolar disorder  Chronic back pain/migraine
unremarkable. ABG showed pH  7.39, pCO2 of 37, and pO2 88. ASSESSMENT:  1.  Drug overdose and possible serotonin syndrome with duloxetine. 2.  Rhabdomyolysis. 3.  Hypoxia. 4.  Tobacco abuse for close to 10 years around a pack to 1.5 pack per  day, but now he is vaping and chewing. 5.  Alcohol abuse, daily intake of six beers plus liquor. 6.  Suicidal ideation and attempt. This is his second episode according  to a sister. 7.  History of anxiety, bipolar disorder. 8.  Chronic back pain and migraine. PLAN OF TREATMENT:  Continue watching the patient. He is on p.r.n. Ativan and continue hydration. Follow up myoglobin and CK, O2. Follow  up chest x-ray. We will add nicotine patch and he is on CIWA protocol. Psychiatry being consulted. He is on Lovenox for DVT prophylaxis. We do appreciate the consultation and we will follow.         Radha Martinez    D: 06/20/2019 12:27:00       T: 06/20/2019 14:50:46     [email or web address]  Job#: [id number]     Doc#: [id number]    CC:

## 2019-06-20 NOTE — PROGRESS NOTES
Patient admitted to ICU 3 from ER and transferred to bed. Monitors placed. Patient oriented to room and call light. Patient appears confused. Guard at bedside for patient safety.

## 2019-06-20 NOTE — ED NOTES
Report given to Philomena Mobley RN from ICU. Report method in person   The following was reviewed with receiving RN:   Current vital signs:  BP (!) 134/91   Pulse 122   Temp 98.2 °F (36.8 °C) (Oral)   Resp 24   Ht 6' 1\" (1.854 m)   Wt 232 lb (105.2 kg)   SpO2 98%   BMI 30.61 kg/m²                MEWS Score: 5     Any medication or safety alerts were reviewed. Any pending diagnostics and notifications were also reviewed, as well as any safety concerns or issues, abnormal labs, abnormal imaging, and abnormal assessment findings. Questions were answered.             Merari Hunt RN  06/19/19 0993

## 2019-06-21 ENCOUNTER — APPOINTMENT (OUTPATIENT)
Dept: GENERAL RADIOLOGY | Age: 34
DRG: 918 | End: 2019-06-21
Payer: OTHER GOVERNMENT

## 2019-06-21 LAB
ANION GAP SERPL CALCULATED.3IONS-SCNC: 11 MMOL/L (ref 9–17)
BILIRUBIN URINE: NEGATIVE
BUN BLDV-MCNC: 9 MG/DL (ref 6–20)
BUN/CREAT BLD: NORMAL (ref 9–20)
CALCIUM SERPL-MCNC: 8.7 MG/DL (ref 8.6–10.4)
CHLORIDE BLD-SCNC: 99 MMOL/L (ref 98–107)
CO2: 25 MMOL/L (ref 20–31)
COLOR: YELLOW
COMMENT UA: NORMAL
CREAT SERPL-MCNC: 0.85 MG/DL (ref 0.7–1.2)
DIRECT EXAM: NORMAL
GFR AFRICAN AMERICAN: >60 ML/MIN
GFR NON-AFRICAN AMERICAN: >60 ML/MIN
GFR SERPL CREATININE-BSD FRML MDRD: NORMAL ML/MIN/{1.73_M2}
GFR SERPL CREATININE-BSD FRML MDRD: NORMAL ML/MIN/{1.73_M2}
GLUCOSE BLD-MCNC: 95 MG/DL (ref 70–99)
GLUCOSE URINE: NEGATIVE
KETONES, URINE: NEGATIVE
LEUKOCYTE ESTERASE, URINE: NEGATIVE
Lab: NORMAL
MAGNESIUM: 1.8 MG/DL (ref 1.6–2.6)
MYOGLOBIN: 80 NG/ML (ref 28–72)
NITRITE, URINE: NEGATIVE
PH UA: 6.5 (ref 5–8)
POTASSIUM SERPL-SCNC: 4 MMOL/L (ref 3.7–5.3)
PROTEIN UA: NEGATIVE
SODIUM BLD-SCNC: 135 MMOL/L (ref 135–144)
SPECIFIC GRAVITY UA: 1.01 (ref 1–1.03)
SPECIMEN DESCRIPTION: NORMAL
TOTAL CK: 5943 U/L (ref 39–308)
TURBIDITY: CLEAR
URINE HGB: NEGATIVE
UROBILINOGEN, URINE: NORMAL

## 2019-06-21 PROCEDURE — 83735 ASSAY OF MAGNESIUM: CPT

## 2019-06-21 PROCEDURE — 2000000000 HC ICU R&B

## 2019-06-21 PROCEDURE — 2580000003 HC RX 258: Performed by: FAMILY MEDICINE

## 2019-06-21 PROCEDURE — 2500000003 HC RX 250 WO HCPCS: Performed by: INTERNAL MEDICINE

## 2019-06-21 PROCEDURE — 2500000003 HC RX 250 WO HCPCS: Performed by: FAMILY MEDICINE

## 2019-06-21 PROCEDURE — 99232 SBSQ HOSP IP/OBS MODERATE 35: CPT | Performed by: FAMILY MEDICINE

## 2019-06-21 PROCEDURE — 90792 PSYCH DIAG EVAL W/MED SRVCS: CPT | Performed by: NURSE PRACTITIONER

## 2019-06-21 PROCEDURE — 80048 BASIC METABOLIC PNL TOTAL CA: CPT

## 2019-06-21 PROCEDURE — 6370000000 HC RX 637 (ALT 250 FOR IP): Performed by: FAMILY MEDICINE

## 2019-06-21 PROCEDURE — 36415 COLL VENOUS BLD VENIPUNCTURE: CPT

## 2019-06-21 PROCEDURE — 82550 ASSAY OF CK (CPK): CPT

## 2019-06-21 PROCEDURE — 6360000002 HC RX W HCPCS: Performed by: FAMILY MEDICINE

## 2019-06-21 PROCEDURE — 51702 INSERT TEMP BLADDER CATH: CPT

## 2019-06-21 PROCEDURE — 83874 ASSAY OF MYOGLOBIN: CPT

## 2019-06-21 PROCEDURE — 87880 STREP A ASSAY W/OPTIC: CPT

## 2019-06-21 PROCEDURE — 71045 X-RAY EXAM CHEST 1 VIEW: CPT

## 2019-06-21 PROCEDURE — 93005 ELECTROCARDIOGRAM TRACING: CPT | Performed by: INTERNAL MEDICINE

## 2019-06-21 PROCEDURE — 81003 URINALYSIS AUTO W/O SCOPE: CPT

## 2019-06-21 RX ORDER — METOPROLOL TARTRATE 5 MG/5ML
2.5 INJECTION INTRAVENOUS EVERY 6 HOURS PRN
Status: DISCONTINUED | OUTPATIENT
Start: 2019-06-21 | End: 2019-06-21

## 2019-06-21 RX ORDER — ACETAMINOPHEN 325 MG/1
650 TABLET ORAL EVERY 4 HOURS PRN
Status: DISCONTINUED | OUTPATIENT
Start: 2019-06-21 | End: 2019-06-24 | Stop reason: HOSPADM

## 2019-06-21 RX ORDER — METOPROLOL TARTRATE 5 MG/5ML
5 INJECTION INTRAVENOUS EVERY 6 HOURS PRN
Status: DISCONTINUED | OUTPATIENT
Start: 2019-06-21 | End: 2019-06-24 | Stop reason: HOSPADM

## 2019-06-21 RX ADMIN — SODIUM CHLORIDE: 9 INJECTION, SOLUTION INTRAVENOUS at 20:34

## 2019-06-21 RX ADMIN — ENOXAPARIN SODIUM 30 MG: 30 INJECTION SUBCUTANEOUS at 08:05

## 2019-06-21 RX ADMIN — DEXMEDETOMIDINE 0.3 MCG/KG/HR: 100 INJECTION, SOLUTION, CONCENTRATE INTRAVENOUS at 05:28

## 2019-06-21 RX ADMIN — ACETAMINOPHEN 650 MG: 325 TABLET, FILM COATED ORAL at 12:03

## 2019-06-21 RX ADMIN — LURASIDONE HYDROCHLORIDE 20 MG: 40 TABLET, FILM COATED ORAL at 08:04

## 2019-06-21 RX ADMIN — SODIUM CHLORIDE: 9 INJECTION, SOLUTION INTRAVENOUS at 05:28

## 2019-06-21 RX ADMIN — GABAPENTIN 600 MG: 600 TABLET, FILM COATED ORAL at 20:34

## 2019-06-21 RX ADMIN — ACETAMINOPHEN 650 MG: 325 TABLET, FILM COATED ORAL at 20:35

## 2019-06-21 RX ADMIN — LORAZEPAM 1 MG: 1 TABLET ORAL at 20:36

## 2019-06-21 RX ADMIN — Medication 10 ML: at 08:05

## 2019-06-21 RX ADMIN — METOPROLOL TARTRATE 2.5 MG: 1 INJECTION, SOLUTION INTRAVENOUS at 12:02

## 2019-06-21 RX ADMIN — ENOXAPARIN SODIUM 30 MG: 30 INJECTION SUBCUTANEOUS at 20:34

## 2019-06-21 RX ADMIN — GABAPENTIN 600 MG: 600 TABLET, FILM COATED ORAL at 08:04

## 2019-06-21 RX ADMIN — LORAZEPAM 2 MG: 1 TABLET ORAL at 22:22

## 2019-06-21 RX ADMIN — CLONIDINE HYDROCHLORIDE 0.1 MG: 0.1 TABLET ORAL at 20:34

## 2019-06-21 RX ADMIN — GABAPENTIN 600 MG: 600 TABLET, FILM COATED ORAL at 13:56

## 2019-06-21 RX ADMIN — METOPROLOL TARTRATE 5 MG: 1 INJECTION, SOLUTION INTRAVENOUS at 18:21

## 2019-06-21 ASSESSMENT — PAIN SCALES - GENERAL
PAINLEVEL_OUTOF10: 7
PAINLEVEL_OUTOF10: 0
PAINLEVEL_OUTOF10: 0
PAINLEVEL_OUTOF10: 5
PAINLEVEL_OUTOF10: 6

## 2019-06-21 ASSESSMENT — PAIN DESCRIPTION - LOCATION
LOCATION: BACK
LOCATION: BACK;THROAT;HEAD
LOCATION: BACK

## 2019-06-21 ASSESSMENT — PAIN DESCRIPTION - PAIN TYPE
TYPE: CHRONIC PAIN
TYPE: CHRONIC PAIN

## 2019-06-21 ASSESSMENT — PAIN DESCRIPTION - FREQUENCY
FREQUENCY: CONTINUOUS
FREQUENCY: CONTINUOUS

## 2019-06-21 ASSESSMENT — PAIN DESCRIPTION - ORIENTATION
ORIENTATION: LOWER
ORIENTATION: LOWER

## 2019-06-21 ASSESSMENT — PAIN DESCRIPTION - ONSET
ONSET: ON-GOING
ONSET: ON-GOING

## 2019-06-21 ASSESSMENT — PAIN DESCRIPTION - DESCRIPTORS
DESCRIPTORS: ACHING
DESCRIPTORS: ACHING

## 2019-06-21 NOTE — PROGRESS NOTES
Patient asking for cell phone. RN told patient that his belongings were sent home with roommate, Donte Saeed, yesterday. Donte Saeed brought the cell phone back in for the patient today. RN spoke with patient and Donte Saeed, educating them that he is not to be using his cell phone per suicide precaution protocol. They voiced understanding.

## 2019-06-21 NOTE — PROGRESS NOTES
Blaise Murcia is a 34 y.o. male patient.     Current Facility-Administered Medications   Medication Dose Route Frequency Provider Last Rate Last Dose    LORazepam (ATIVAN) tablet 1 mg  1 mg Oral Q1H PRN Rainer Jurado MD        Or    LORazepam (ATIVAN) injection 1 mg  1 mg Intravenous Q1H PRN Rainer Jurado MD        Or    LORazepam (ATIVAN) tablet 2 mg  2 mg Oral Q1H PRN Rainer Jurado MD        Or    LORazepam (ATIVAN) injection 2 mg  2 mg Intravenous Q1H PRN Rainer Jurado MD   2 mg at 06/20/19 0146    Or    LORazepam (ATIVAN) tablet 3 mg  3 mg Oral Q1H PRN Rainer Jurado MD        Or    LORazepam (ATIVAN) injection 3 mg  3 mg Intravenous Q1H PRN Rainer Jurado MD        Or    LORazepam (ATIVAN) tablet 4 mg  4 mg Oral Q1H PRN Rainer Jurado MD        Or    LORazepam (ATIVAN) injection 4 mg  4 mg Intravenous Q1H PRN Rainer Jurado MD        nicotine (NICODERM CQ) 21 MG/24HR 1 patch  1 patch Transdermal Daily Mariama Castellanos MD        LORazepam (ATIVAN) injection 0.5 mg  0.5 mg Intravenous Q6H PRN Andreina Sun MD   0.5 mg at 06/19/19 1835    0.9 % sodium chloride infusion   Intravenous Continuous Wilfred Iyer  mL/hr at 06/19/19 2307      cloNIDine (CATAPRES) tablet 0.1 mg  0.1 mg Oral Nightly PRN Andreina Sun MD        gabapentin (NEURONTIN) tablet 600 mg  600 mg Oral TID Andreina Sun MD   600 mg at 06/21/19 0804    lurasidone (LATUDA) tablet 20 mg  20 mg Oral Daily Andreina Sun MD   20 mg at 06/21/19 0804    sodium chloride flush 0.9 % injection 10 mL  10 mL Intravenous 2 times per day Andreina Sun MD        sodium chloride flush 0.9 % injection 10 mL  10 mL Intravenous PRN Andreina Sun MD        magnesium hydroxide (MILK OF MAGNESIA) 400 MG/5ML suspension 30 mL  30 mL Oral Daily PRN Andreina Sun MD        ondansetron [facility]) injection 4 mg  4 mg Intravenous Q6H PRN Andreina Sun MD        enoxaparin (LOVENOX) injection 30 mg  30 mg Subcutaneous BID Andreina Sun MD   30 mg at 06/21/19 0805    0.9 % sodium chloride infusion   Intravenous Continuous Andreina Sun  mL/hr at 06/21/19 0528      dexmedetomidine (PRECEDEX) 400 mcg in sodium chloride 0.9 % 100 mL infusion  0.2 mcg/kg/hr Intravenous Continuous Rainer Jurado MD   Stopped at 06/21/19 0700    sodium chloride flush 0.9 % injection 10 mL  10 mL Intravenous 2 times per day Rainer Jurado MD   10 mL at 06/21/19 0805     No Known Allergies  Active Problems:    Chronic left-sided low back pain without sciatica    Migraine without status migrainosus, not intractable    Severe episode of recurrent major depressive disorder, without psychotic features ([facility])    Serotonin syndrome    Class 1 obesity due to excess calories without serious comorbidity with body mass index (BMI) of 30.0 to 30.9 in adult    SVT (supraventricular tachycardia) ([facility] [address]  Resolved Problems:    * No resolved hospital problems. *    Blood pressure (!) 160/117, pulse 96, temperature 98.9 °F (37.2 °C), temperature source Oral, resp. rate 27, height 6' 1\" (1.854 m), weight 232 lb (105.2 kg), SpO2 97 %. Subjective:  Symptoms:  (Remains tachycardic and hypertensive. Awake and alert, drinking coffee. Tremoring at rest. ). Diet:  Adequate intake. Activity level: Returning to normal.    Pain:  He reports no pain. Objective:  General Appearance:  Comfortable. Vital signs: (most recent): Blood pressure (!) 160/117, pulse 96, temperature 98.9 °F (37.2 °C), temperature source Oral, resp. rate 27, height 6' 1\" (1.854 m), weight 232 lb (105.2 kg), SpO2 97 %. (Hypertensive, tachycardic). Output: Producing urine. Lungs:  Normal effort and normal respiratory rate. Breath sounds clear to auscultation. Heart: Tachycardia. Regular rhythm.   S1 normal and S2 normal.      Assessment & Plan  Rhabdomyolysis and serotonin syndrome d/t duloxetine overdose - continue IV hydration and Precedex; critical care management  Suicide attempt - psych to see     Suhail Gallardo MD  6/21/2019
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
## 2019-06-21 NOTE — CARE COORDINATION
DISCHARGE PLANNING NOTE:    Pt to be discharged to [facility] once medically cleared.     Electronically signed by Yadiel Flowers RN on 6/21/2019 at 11:38 AM

## 2019-06-21 NOTE — PROGRESS NOTES
Patient called RN into the room. He stated that his tonsils feel swollen, making it difficult to swallow. RN assessed back of his throat, noting swelling, redness, and white patches. Patient also voiced that he feels like he is having difficulty urinating, saying that he has to \"push really hard\" to finish urinating. He also asked if it was ok to drink energy drinks since he is on a general diet. RN educated him that his heart rate and blood pressure are high so it would not be a good idea to drink any energy drinks. He voiced understanding. Dr. Cristina Jolly was notified of these concerns. Orders for a strep culture. Will also bladder scan and place a swan if greater than 400 ml.

## 2019-06-21 NOTE — PROGRESS NOTES
When asked patient if he remembers what happened, Pt stated that he took his cymbalta on purpose.  I explained that he would have his psych encounter tomorrow and he stated that \"it wouldn't do any good\" reassurance given

## 2019-06-21 NOTE — PROGRESS NOTES
Bladder scan performed showing greater than 999 ml. Guevara inserted per Dr. Nathalie Lopez. >1 liter out. Urine culture sent.

## 2019-06-21 NOTE — PROGRESS NOTES
Patient seeing spiders in the room. Thought he was bitten by a spider. Took off gown and bp cuff looking for them. Reassurance given.  Sitter remains at bedside

## 2019-06-21 NOTE — PROGRESS NOTES
ICU Progress Note (Non-Vent)  [facility]    Patient - Chintan Nuñez,  Age - 34 y.o.    - 1985      Room Number -    MRN -  [id number]   [facility]  Date of Admission -  2019  6:23 PM     Follow-up: ICU management, drug overdose    Events of Past 24 Hours   Feeling much better, denies any suicidal ideation  Room air  Denies any fever chills, no chest pain  Still with a little cough, no short of breath or wheezing  No nausea vomiting or diarrhea  Blood pressure elevated, noted he had hypertension prior but did not take treatment    Vitals    height is 6' 1\" (1.854 m) and weight is 232 lb (105.2 kg). His oral temperature is 98.4 °F (36.9 °C). His blood pressure is 175/117 (abnormal) and his pulse is 109. His respiration is 24 and oxygen saturation is 95%.        Temperature Range: Temp: 98.4 °F (36.9 °C) Temp  Av.9 °F (37.2 °C)  Min: 98.4 °F (36.9 °C)  Max: 99.4 °F (37.4 °C)  BP Range:  Systolic (62VZO), QIY:669 , Min:109 , JWV:732     Diastolic (22VXI), MTK:37, Min:78, Max:131    Pulse Range: Pulse  Av.7  Min: 77  Max: 115  Respiration Range: Resp  Av.9  Min: 14  Max: 28  Current Pulse Ox[de-identified]  SpO2: 95 %  24HR Pulse Ox Range:  SpO2  Av.3 %  Min: 95 %  Max: 100 %  Oxygen Amount and Delivery: O2 Flow Rate (L/min): 2 L/min    Wt Readings from Last 3 Encounters:   19 232 lb (105.2 kg)   19 232 lb 6 oz (105.4 kg)   19 224 lb 4 oz (101.7 kg)     I/O       Intake/Output Summary (Last 24 hours) at 2019 1154  Last data filed at 2019 1127  Gross per 24 hour   Intake 950 ml   Output 2950 ml   Net -2000 ml     DRAIN/TUBE OUTPUT       Invasive Lines   ICP PRESSURE RANGE  No data recorded  CVP PRESSURE RANGE  No data recorded      Medications      nicotine  1 patch Transdermal Daily    gabapentin  600 mg Oral TID    lurasidone  20 mg Oral Daily    06/19/2019    BILITOT 0.65 06/19/2019    LABALBU 4.7 06/19/2019       ABG ABGs:   Lab Results   Component Value Date    PHART 7.392 06/19/2019    PO2ART 88.7 06/19/2019    ZQF6KGF 37.5 06/19/2019       Lab Results   Component Value Date    MODE NOT REPORTED 06/19/2019         INR  Recent Labs     06/19/19 1820   PROTIME 12.6   INR 0.9       APTT  Recent Labs     06/19/19 1820   APTT 27.6       Lactic Acid  Lab Results   Component Value Date    LACTA 2.9 06/19/2019    LACTA 2.3 06/19/2019        BNP   No results for input(s): BNP in the last 72 hours. Cultures       Radiology     CXR      CT Scans    (See actual reports for details)      SYSTEMS ASSESSMENT    Neuro       Respiratory   Wean oxygen as tolerated. Keep O2 sat > 88%    Cardiovascular        Gastrointestinal       Renal       Infectious Disease       Hematology/Oncology       Endocrine       Social/Spiritual/DNR/Disposition/Other     ASSESSMENT:  1.  Drug overdose and possible serotonin syndrome with duloxetine. 2.  Rhabdomyolysis. Improving  3. Hypoxia/better. 4.  Tobacco abuse for close to 10 years around a pack to 1.5 pack per day, but now he is vaping and chewing. 5.  Alcohol abuse, daily intake of at least six beers plus liquor. 6.  Suicidal ideation and attempt. This is his second episode according to his sister. 7.  History of anxiety, bipolar disorder. 8.  Chronic back pain and migraine. HTN     PLAN OF TREATMENT:       on p.r.n. Ativan   continue hydration. Follow up CK,   O2. As needed  On nicotine patch and he is on CIWA protocol. Psychiatry following  Lovenox for DVT prophylaxis.   Add PRN IV metoprolol and see how he responds  Discussed with patient and staff    Electronically signed by Shala Maloney MD on 6/21/2019 at 11:54 AM

## 2019-06-21 NOTE — PROGRESS NOTES
Notified Dr. Gilberto Ramirez that strep culture was negative. Orders for tylenol 650 mg PRN every 4 hours received.

## 2019-06-21 NOTE — PROGRESS NOTES
Consent for telehealth psych consult signed and the process was explained to the paitient. Consult scheduled for 0815.

## 2019-06-22 LAB
ANION GAP SERPL CALCULATED.3IONS-SCNC: 10 MMOL/L (ref 9–17)
BUN BLDV-MCNC: 8 MG/DL (ref 6–20)
BUN/CREAT BLD: ABNORMAL (ref 9–20)
CALCIUM SERPL-MCNC: 9.1 MG/DL (ref 8.6–10.4)
CHLORIDE BLD-SCNC: 102 MMOL/L (ref 98–107)
CO2: 25 MMOL/L (ref 20–31)
CREAT SERPL-MCNC: 0.81 MG/DL (ref 0.7–1.2)
EKG ATRIAL RATE: 132 BPM
EKG ATRIAL RATE: 92 BPM
EKG P AXIS: -26 DEGREES
EKG P AXIS: 33 DEGREES
EKG P-R INTERVAL: 120 MS
EKG P-R INTERVAL: 182 MS
EKG Q-T INTERVAL: 346 MS
EKG Q-T INTERVAL: 374 MS
EKG QRS DURATION: 110 MS
EKG QRS DURATION: 112 MS
EKG QTC CALCULATION (BAZETT): 462 MS
EKG QTC CALCULATION (BAZETT): 512 MS
EKG R AXIS: 10 DEGREES
EKG R AXIS: 169 DEGREES
EKG T AXIS: 47 DEGREES
EKG T AXIS: 51 DEGREES
EKG VENTRICULAR RATE: 132 BPM
EKG VENTRICULAR RATE: 92 BPM
GFR AFRICAN AMERICAN: >60 ML/MIN
GFR NON-AFRICAN AMERICAN: >60 ML/MIN
GFR SERPL CREATININE-BSD FRML MDRD: ABNORMAL ML/MIN/{1.73_M2}
GFR SERPL CREATININE-BSD FRML MDRD: ABNORMAL ML/MIN/{1.73_M2}
GLUCOSE BLD-MCNC: 100 MG/DL (ref 70–99)
MAGNESIUM: 1.9 MG/DL (ref 1.6–2.6)
MYOGLOBIN: 26 NG/ML (ref 28–72)
POTASSIUM SERPL-SCNC: 4.3 MMOL/L (ref 3.7–5.3)
SODIUM BLD-SCNC: 137 MMOL/L (ref 135–144)
TOTAL CK: 3277 U/L (ref 39–308)

## 2019-06-22 PROCEDURE — 83735 ASSAY OF MAGNESIUM: CPT

## 2019-06-22 PROCEDURE — 2500000003 HC RX 250 WO HCPCS: Performed by: INTERNAL MEDICINE

## 2019-06-22 PROCEDURE — 80048 BASIC METABOLIC PNL TOTAL CA: CPT

## 2019-06-22 PROCEDURE — 82550 ASSAY OF CK (CPK): CPT

## 2019-06-22 PROCEDURE — 93010 ELECTROCARDIOGRAM REPORT: CPT | Performed by: INTERNAL MEDICINE

## 2019-06-22 PROCEDURE — 2060000000 HC ICU INTERMEDIATE R&B

## 2019-06-22 PROCEDURE — 6370000000 HC RX 637 (ALT 250 FOR IP): Performed by: FAMILY MEDICINE

## 2019-06-22 PROCEDURE — 6360000002 HC RX W HCPCS: Performed by: FAMILY MEDICINE

## 2019-06-22 PROCEDURE — 2580000003 HC RX 258: Performed by: FAMILY MEDICINE

## 2019-06-22 PROCEDURE — 99232 SBSQ HOSP IP/OBS MODERATE 35: CPT | Performed by: FAMILY MEDICINE

## 2019-06-22 PROCEDURE — 83874 ASSAY OF MYOGLOBIN: CPT

## 2019-06-22 PROCEDURE — 36415 COLL VENOUS BLD VENIPUNCTURE: CPT

## 2019-06-22 PROCEDURE — 94761 N-INVAS EAR/PLS OXIMETRY MLT: CPT

## 2019-06-22 RX ADMIN — ACETAMINOPHEN 650 MG: 325 TABLET, FILM COATED ORAL at 03:10

## 2019-06-22 RX ADMIN — SODIUM CHLORIDE: 9 INJECTION, SOLUTION INTRAVENOUS at 11:41

## 2019-06-22 RX ADMIN — ENOXAPARIN SODIUM 30 MG: 30 INJECTION SUBCUTANEOUS at 19:36

## 2019-06-22 RX ADMIN — GABAPENTIN 600 MG: 600 TABLET, FILM COATED ORAL at 14:28

## 2019-06-22 RX ADMIN — ENOXAPARIN SODIUM 30 MG: 30 INJECTION SUBCUTANEOUS at 08:28

## 2019-06-22 RX ADMIN — LORAZEPAM 3 MG: 1 TABLET ORAL at 03:11

## 2019-06-22 RX ADMIN — GABAPENTIN 600 MG: 600 TABLET, FILM COATED ORAL at 08:27

## 2019-06-22 RX ADMIN — LURASIDONE HYDROCHLORIDE 20 MG: 40 TABLET, FILM COATED ORAL at 08:28

## 2019-06-22 RX ADMIN — METOPROLOL TARTRATE 5 MG: 1 INJECTION, SOLUTION INTRAVENOUS at 00:40

## 2019-06-22 RX ADMIN — GABAPENTIN 600 MG: 600 TABLET, FILM COATED ORAL at 19:36

## 2019-06-22 RX ADMIN — SODIUM CHLORIDE: 9 INJECTION, SOLUTION INTRAVENOUS at 03:10

## 2019-06-22 ASSESSMENT — PAIN DESCRIPTION - PAIN TYPE
TYPE: CHRONIC PAIN
TYPE: CHRONIC PAIN

## 2019-06-22 ASSESSMENT — PAIN DESCRIPTION - ORIENTATION: ORIENTATION: LOWER

## 2019-06-22 ASSESSMENT — PAIN DESCRIPTION - ONSET
ONSET: ON-GOING
ONSET: ON-GOING

## 2019-06-22 ASSESSMENT — PAIN DESCRIPTION - LOCATION
LOCATION: BACK
LOCATION: BACK

## 2019-06-22 ASSESSMENT — PAIN SCALES - GENERAL
PAINLEVEL_OUTOF10: 0
PAINLEVEL_OUTOF10: 5
PAINLEVEL_OUTOF10: 4

## 2019-06-22 ASSESSMENT — PAIN DESCRIPTION - DESCRIPTORS
DESCRIPTORS: ACHING
DESCRIPTORS: ACHING

## 2019-06-22 ASSESSMENT — PAIN DESCRIPTION - FREQUENCY
FREQUENCY: CONTINUOUS
FREQUENCY: CONTINUOUS

## 2019-06-22 NOTE — PROGRESS NOTES
Patient received from ICU. Vitals taken. Assessment completed. No distress noted. See doc flowsheet and transfer navigator for details. POC and education reviewed with patient. Call light within reach, and pt educated on its use. Bed in lowest position, and locked. Side rails up x 2. Denied further questions or needs at this time. Will continue to monitor.

## 2019-06-22 NOTE — PROGRESS NOTES
-symmetric expansion clear bilaterally no wheezes no rhonchi  Cardiovascular - Heart sounds are normal.  Regular rate and rhythm   Abdomen - Soft, nontender, nondistended, no masses or organomegaly, eating well  Neurologic - There are no focal motor or sensory deficits  Skin - No bruising or bleeding  Extremities - No clubbing, cyanosis, edema    MEDS      nicotine  1 patch Transdermal Daily    gabapentin  600 mg Oral TID    lurasidone  20 mg Oral Daily    sodium chloride flush  10 mL Intravenous 2 times per day    enoxaparin  30 mg Subcutaneous BID    sodium chloride flush  10 mL Intravenous 2 times per day      sodium chloride 125 mL/hr at 06/19/19 2307    sodium chloride 125 mL/hr at 06/22/19 1141    dexmedetomidine (PRECEDEX) IV infusion Stopped (06/21/19 0700)     acetaminophen, metoprolol, LORazepam **OR** LORazepam **OR** LORazepam **OR** LORazepam **OR** LORazepam **OR** LORazepam **OR** LORazepam **OR** LORazepam, LORazepam, cloNIDine, sodium chloride flush, magnesium hydroxide, ondansetron    LABS   CBC   Recent Labs     06/19/19 1820   WBC 7.3   HGB 16.4   HCT 47.5   MCV 85.3        BMP:   Lab Results   Component Value Date     06/22/2019    K 4.3 06/22/2019     06/22/2019    CO2 25 06/22/2019    BUN 8 06/22/2019    LABALBU 4.7 06/19/2019    CREATININE 0.81 06/22/2019    CALCIUM 9.1 06/22/2019    GFRAA >60 06/22/2019    LABGLOM >60 06/22/2019     ABGs:  Lab Results   Component Value Date    PHART 7.392 06/19/2019    PO2ART 88.7 06/19/2019    CUC4YBB 37.5 06/19/2019      Lab Results   Component Value Date    MODE NOT REPORTED 06/19/2019     Ionized Calcium:  No results found for: IONCA  Magnesium:    Lab Results   Component Value Date    MG 1.9 06/22/2019     Phosphorus:  No results found for: PHOS     LIVER PROFILE   Recent Labs     06/19/19 1820   AST 27   ALT 38   BILITOT 0.65   ALKPHOS 51     INR   Recent Labs     06/19/19 1820   INR 0.9     PTT   Lab Results   Component Value Date    APTT 27.6 06/19/2019         RADIOLOGY     (See actual reports for details)    ASSESSMENT/PLAN   Active Problems:    Chronic left-sided low back pain without sciatica    Migraine without status migrainosus, not intractable    Severe episode of recurrent major depressive disorder, without psychotic features (HCC)    Serotonin syndrome    Class 1 obesity due to excess calories without serious comorbidity with body mass index (BMI) of 30.0 to 30.9 in adult    SVT (supraventricular tachycardia) ([facility] [address]  Resolved Problems:    * No resolved hospital problems.  *    Plan:  Okay to transfer to floor  Discussed with RN  Further psych evaluation  Increase activity    Electronically signed by Bruno Lake MD on 6/22/2019 at 12:34 PM

## 2019-06-22 NOTE — PROGRESS NOTES
Geremias Breath is a 34 y.o. male patient.     Current Facility-Administered Medications   Medication Dose Route Frequency Provider Last Rate Last Dose    acetaminophen (TYLENOL) tablet 650 mg  650 mg Oral Q4H PRN Stacey Massey MD   650 mg at 06/22/19 0310    metoprolol (LOPRESSOR) injection 5 mg  5 mg Intravenous Q6H PRN Carlton Murray MD   5 mg at 06/22/19 0040    LORazepam (ATIVAN) tablet 1 mg  1 mg Oral Q1H PRN Jose Montgomery MD   1 mg at 06/21/19 2036    Or    LORazepam (ATIVAN) injection 1 mg  1 mg Intravenous Q1H PRN Jose Montgomery MD        Or    LORazepam (ATIVAN) tablet 2 mg  2 mg Oral Q1H PRN Jose Montgomery MD   2 mg at 06/21/19 2222    Or    LORazepam (ATIVAN) injection 2 mg  2 mg Intravenous Q1H PRN Jose Montgomery MD   2 mg at 06/20/19 0146    Or    LORazepam (ATIVAN) tablet 3 mg  3 mg Oral Q1H PRN Jose Montgomery MD   3 mg at 06/22/19 5525    Or    LORazepam (ATIVAN) injection 3 mg  3 mg Intravenous Q1H PRN Jose Montgomery MD        Or    LORazepam (ATIVAN) tablet 4 mg  4 mg Oral Q1H PRN Jose Montgomery MD        Or    LORazepam (ATIVAN) injection 4 mg  4 mg Intravenous Q1H PRN Jose Montgomery MD        nicotine (NICODERM CQ) 21 MG/24HR 1 patch  1 patch Transdermal Daily Carlton Murray MD        LORazepam (ATIVAN) injection 0.5 mg  0.5 mg Intravenous Q6H PRN Stacey Massey MD   0.5 mg at 06/19/19 1835    0.9 % sodium chloride infusion   Intravenous Continuous Lelecesar Feliciano  mL/hr at 06/19/19 2307      cloNIDine (CATAPRES) tablet 0.1 mg  0.1 mg Oral Nightly PRN Stacey Massey MD   0.1 mg at 06/21/19 2034    gabapentin (NEURONTIN) tablet 600 mg  600 mg Oral TID Stacey Massey MD   600 mg at 06/22/19 0827    lurasidone (LATUDA) tablet 20 mg  20 mg Oral Daily Stacey Massey MD   20 mg at 06/22/19 1857    sodium chloride flush 0.9 % injection 10 mL  10 mL Intravenous 2 times per day Stacey Massey MD        sodium chloride flush 0.9 % injection 10 mL  10 mL Intravenous PRN Grant Rosas MD        magnesium hydroxide (MILK OF MAGNESIA) 400 MG/5ML suspension 30 mL  30 mL Oral Daily PRN Gratn Rosas MD        [facility]ron [facility]) injection 4 mg  4 mg Intravenous Q6H PRN Grant Rosas MD        enoxaparin (LOVENOX) injection 30 mg  30 mg Subcutaneous BID Grant Rosas MD   30 mg at 06/22/19 1036    0.9 % sodium chloride infusion   Intravenous Continuous Grant Rosas  mL/hr at 06/22/19 0310      dexmedetomidine (PRECEDEX) 400 mcg in sodium chloride 0.9 % 100 mL infusion  0.2 mcg/kg/hr Intravenous Continuous Nely Cameron MD   Stopped at 06/21/19 0700    sodium chloride flush 0.9 % injection 10 mL  10 mL Intravenous 2 times per day Nely Cameron MD   10 mL at 06/21/19 0805     No Known Allergies  Active Problems:    Chronic left-sided low back pain without sciatica    Migraine without status migrainosus, not intractable    Severe episode of recurrent major depressive disorder, without psychotic features (HCC)    Serotonin syndrome    Class 1 obesity due to excess calories without serious comorbidity with body mass index (BMI) of 30.0 to 30.9 in adult    [facility]ra[facility] tachy[facility]) ([facility]  Resolved Problems:    * No resolved hospital problems. *    Blood pressure (!) 136/101, pulse 79, temperature 98.6 °F (37 °C), temperature source Oral, resp. rate 18, height 6' 1\" (1.854 m), weight 232 lb (105.2 kg), SpO2 97 %. Subjective:  Symptoms:  Improved. Diet:  Adequate intake. Activity level: Impaired due to weakness. Objective:  General Appearance:  Comfortable. Vital signs: (most recent): Blood pressure (!) 136/101, pulse 79, temperature 98.6 °F (37 °C), temperature source Oral, resp. rate 18, height 6' 1\" (1.854 m), weight 232 lb (105.2 kg), SpO2 97 %. (BP elevated). Heart: Normal rate. Regular rhythm.   S1 normal and S2 normal.      Assessment & Plan  Rhabdomyolysis - goal of CK below 1000 for medical clearance. Tachycardia/hypertension - both slowly improving    Plan for [facility] discharge when medically stable. Patient will not be receptive to that.      Rashard Mann MD  6/22/2019

## 2019-06-22 NOTE — CARE COORDINATION
DISCHARGE PLANNING NOTE:     Pt to be discharged to [facility] once medically cleared.     Electronically signed by Kandy Jha RN on 6/22/2019 at 9:17 AM

## 2019-06-23 LAB
ANION GAP SERPL CALCULATED.3IONS-SCNC: 12 MMOL/L (ref 9–17)
BUN BLDV-MCNC: 11 MG/DL (ref 6–20)
BUN/CREAT BLD: ABNORMAL (ref 9–20)
CALCIUM SERPL-MCNC: 8.8 MG/DL (ref 8.6–10.4)
CHLORIDE BLD-SCNC: 103 MMOL/L (ref 98–107)
CO2: 25 MMOL/L (ref 20–31)
CREAT SERPL-MCNC: 0.84 MG/DL (ref 0.7–1.2)
GFR AFRICAN AMERICAN: >60 ML/MIN
GFR NON-AFRICAN AMERICAN: >60 ML/MIN
GFR SERPL CREATININE-BSD FRML MDRD: ABNORMAL ML/MIN/{1.73_M2}
GFR SERPL CREATININE-BSD FRML MDRD: ABNORMAL ML/MIN/{1.73_M2}
GLUCOSE BLD-MCNC: 100 MG/DL (ref 70–99)
MAGNESIUM: 1.9 MG/DL (ref 1.6–2.6)
MYOGLOBIN: <21 NG/ML (ref 28–72)
POTASSIUM SERPL-SCNC: 4 MMOL/L (ref 3.7–5.3)
SODIUM BLD-SCNC: 140 MMOL/L (ref 135–144)
TOTAL CK: 1198 U/L (ref 39–308)

## 2019-06-23 PROCEDURE — 80048 BASIC METABOLIC PNL TOTAL CA: CPT

## 2019-06-23 PROCEDURE — 82550 ASSAY OF CK (CPK): CPT

## 2019-06-23 PROCEDURE — 36415 COLL VENOUS BLD VENIPUNCTURE: CPT

## 2019-06-23 PROCEDURE — 83874 ASSAY OF MYOGLOBIN: CPT

## 2019-06-23 PROCEDURE — 6360000002 HC RX W HCPCS: Performed by: FAMILY MEDICINE

## 2019-06-23 PROCEDURE — 2060000000 HC ICU INTERMEDIATE R&B

## 2019-06-23 PROCEDURE — 99232 SBSQ HOSP IP/OBS MODERATE 35: CPT | Performed by: FAMILY MEDICINE

## 2019-06-23 PROCEDURE — 83735 ASSAY OF MAGNESIUM: CPT

## 2019-06-23 PROCEDURE — 6370000000 HC RX 637 (ALT 250 FOR IP): Performed by: FAMILY MEDICINE

## 2019-06-23 RX ORDER — METOPROLOL TARTRATE 50 MG/1
50 TABLET, FILM COATED ORAL 2 TIMES DAILY
Status: DISCONTINUED | OUTPATIENT
Start: 2019-06-23 | End: 2019-06-24 | Stop reason: HOSPADM

## 2019-06-23 RX ADMIN — GABAPENTIN 600 MG: 600 TABLET, FILM COATED ORAL at 14:56

## 2019-06-23 RX ADMIN — ENOXAPARIN SODIUM 30 MG: 30 INJECTION SUBCUTANEOUS at 19:54

## 2019-06-23 RX ADMIN — GABAPENTIN 600 MG: 600 TABLET, FILM COATED ORAL at 19:53

## 2019-06-23 RX ADMIN — LURASIDONE HYDROCHLORIDE 20 MG: 40 TABLET, FILM COATED ORAL at 09:52

## 2019-06-23 RX ADMIN — METOPROLOL TARTRATE 50 MG: 50 TABLET ORAL at 19:53

## 2019-06-23 RX ADMIN — GABAPENTIN 600 MG: 600 TABLET, FILM COATED ORAL at 07:54

## 2019-06-23 RX ADMIN — METOPROLOL TARTRATE 50 MG: 50 TABLET ORAL at 09:52

## 2019-06-23 RX ADMIN — ENOXAPARIN SODIUM 30 MG: 30 INJECTION SUBCUTANEOUS at 07:54

## 2019-06-23 NOTE — PROGRESS NOTES
Physical Therapy  DATE: 2019    NAME: Nina Gerardo  MRN: [id number]   : 1985    Patient not seen this date for Physical Therapy due to:  [] Blood transfusion in progress  [] Hemodialysis  []  Patient Declined  [] Spine Precautions   [] Strict Bedrest  [] Surgery/ Procedure  [] Testing      [] Other        [x] PT being discontinued at this time. Patient independent. No further needs. 19: At 901am, pt D/C PT. Pt denies need for PT OT services - pt IND in room. AURELIA Saldivar notified. [] PT being discontinued at this time as the patient has been transferred to palliative care. No further needs.     Alyssia English, PT

## 2019-06-23 NOTE — PROGRESS NOTES
Lobito Ambrocio is a 34 y.o. male patient.     Current Facility-Administered Medications   Medication Dose Route Frequency Provider Last Rate Last Dose    metoprolol tartrate (LOPRESSOR) tablet 50 mg  50 mg Oral BID Kathryn Renteria MD        acetaminophen (TYLENOL) tablet 650 mg  650 mg Oral Q4H PRN Kathryn Renteria MD   650 mg at 06/22/19 0310    metoprolol (LOPRESSOR) injection 5 mg  5 mg Intravenous Q6H PRN Aubrey Paula MD   5 mg at 06/22/19 0040    LORazepam (ATIVAN) tablet 1 mg  1 mg Oral Q1H PRN Maricel Strong MD   1 mg at 06/21/19 2036    Or    LORazepam (ATIVAN) injection 1 mg  1 mg Intravenous Q1H PRN Maricel Strong MD        Or    LORazepam (ATIVAN) tablet 2 mg  2 mg Oral Q1H PRN Maricel Strong MD   2 mg at 06/21/19 2222    Or    LORazepam (ATIVAN) injection 2 mg  2 mg Intravenous Q1H PRN Maricel Strong MD   2 mg at 06/20/19 0146    Or    LORazepam (ATIVAN) tablet 3 mg  3 mg Oral Q1H PRN Maricel Strong MD   3 mg at 06/22/19 7206    Or    LORazepam (ATIVAN) injection 3 mg  3 mg Intravenous Q1H PRN Maricel Strong MD        Or    LORazepam (ATIVAN) tablet 4 mg  4 mg Oral Q1H PRN Maricel Strong MD        Or    LORazepam (ATIVAN) injection 4 mg  4 mg Intravenous Q1H PRN Maricel Strong MD        nicotine (NICODERM CQ) 21 MG/24HR 1 patch  1 patch Transdermal Daily Aubrey Paula MD        LORazepam (ATIVAN) injection 0.5 mg  0.5 mg Intravenous Q6H PRN Kathryn Renteria MD   0.5 mg at 06/19/19 1835    0.9 % sodium chloride infusion   Intravenous Continuous Prince Lopez  mL/hr at 06/19/19 2307      cloNIDine (CATAPRES) tablet 0.1 mg  0.1 mg Oral Nightly PRN Kathryn Renteria MD   0.1 mg at 06/21/19 2034    gabapentin (NEURONTIN) tablet 600 mg  600 mg Oral TID Kathryn Renteria MD   600 mg at 06/23/19 0754    lurasidone (LATUDA) tablet 20 mg  20 mg Oral Daily Kathryn Ernteria, MD   20 mg at 06/22/19 4950    sodium chloride flush 0.9 % injection 10 mL  10 mL Intravenous 2 times per day Sabi Aleman MD        sodium chloride flush 0.9 % injection 10 mL  10 mL Intravenous PRN Sabi Aleman MD        magnesium hydroxide (MILK OF MAGNESIA) 400 MG/5ML suspension 30 mL  30 mL Oral Daily PRN Sabi Aleman MD        ondansetron [facility]) injection 4 mg  4 mg Intravenous Q6H PRN Sabi Aleman MD        enoxaparin (LOVENOX) injection 30 mg  30 mg Subcutaneous BID Sabi Aleman MD   30 mg at 06/23/19 0754    0.9 % sodium chloride infusion   Intravenous Continuous Sabi Aleman  mL/hr at 06/22/19 1141      sodium chloride flush 0.9 % injection 10 mL  10 mL Intravenous 2 times per day Moose Louie MD   10 mL at 06/21/19 0805     No Known Allergies  Active Problems:    Chronic left-sided low back pain without sciatica    Migraine without status migrainosus, not intractable    Severe episode of recurrent major depressive disorder, without psychotic features (HCC)    Serotonin syndrome    Class 1 obesity due to excess calories without serious comorbidity with body mass index (BMI) of 30.0 to 30.9 in adult    SVT (supraventricular tachycardia) ([facility] [address]  Resolved Problems:    * No resolved hospital problems. *    Blood pressure (!) 146/79, pulse 83, temperature 98.3 °F (36.8 °C), temperature source Oral, resp. rate 16, height 6' 1\" (1.854 m), weight 232 lb (105.2 kg), SpO2 100 %. Subjective:  Symptoms:  Stable. Diet:  Adequate intake. Activity level: Normal.    Pain:  He reports no pain. Objective:  General Appearance:  Comfortable. Vital signs: (most recent): Blood pressure (!) 146/79, pulse 83, temperature 98.3 °F (36.8 °C), temperature source Oral, resp. rate 16, height 6' 1\" (1.854 m), weight 232 lb (105.2 kg), SpO2 100 %. (BP elevated). Assessment & Plan  Serotonin syndrome/rhabdomyolysis d/t duloxetine od with suicide attempt - d/c beny, continue IV hydration.  When CK < 1000, medically stable for d/c to

## 2019-06-23 NOTE — PROGRESS NOTES
[address] [facility]    OCCUPATIONAL THERAPY MISSED TREATMENT NOTE   INPATIENT   Date: 19  Patient Name: Ela Morton       Room:   MRN: [id number]   Account #: [de-identified]    : 1985  (34 y.o.)  Gender: male                 REASON FOR MISSED TREATMENT:  Pt reports and demonstrates IND with all self-care and functional mobility. Pt denies any concerns regarding self-care for d/c. No need for skilled OT services at this time.   -   OT being discontinued at this time.  Patient functioning at Premorbid Level  No further needs       Darrell Kirkpatrick OT

## 2019-06-23 NOTE — CARE COORDINATION
ONGOING DISCHARGE PLAN:    Discharge plan for the patient is home vs [facility]. Per Dr Barbi Curtis note psych to consult again on patient tomorrow in person. Will continue to follow for additional discharge needs.     Electronically signed by Lorraine Frias RN on 6/23/2019 at 11:34 AM

## 2019-06-23 NOTE — PROGRESS NOTES
Dr yogesh Snider Hirmarquis notified thru perfect [employer] to see pt for psych consult face to face for tomorrow 6/24 per tm

## 2019-06-23 NOTE — PROGRESS NOTES
Pulmonary Progress Note  Pulmonary [facility] Critical Care Specialists      Patient - Klarissa Hawkins,  Age - 34 y.o.    - 1985      Room Number - 2092/2092-01   N -  [id number]   Acct # - [de-identified]  Date of Admission -  2019  6:23 PM        Consulting Juan Alberto Salinas MD  Primary Care Physician - Antonella Ellis MD     SUBJECTIVE   Lying comfortably in bed, off oxygen, no respiratory complaints, eating well    OBJECTIVE   VITALS    height is 6' 1\" (1.854 m) and weight is 232 lb (105.2 kg). His oral temperature is 98.4 °F (36.9 °C). His blood pressure is 148/81 (abnormal) and his pulse is 84. His respiration is 16 and oxygen saturation is 97%. Body mass index is 30.61 kg/m². Temperature Range: Temp: 98.4 °F (36.9 °C) Temp  Av.4 °F (36.9 °C)  Min: 98.3 °F (36.8 °C)  Max: 98.6 °F (37 °C)  BP Range:  Systolic (41CCP), EZC:401 , Min:138 , YISSEL:134     Diastolic (16WUU), UVU:58, Min:79, Max:101    Pulse Range: Pulse  Av.8  Min: 83  Max: 94  Respiration Range: Resp  Av.8  Min: 16  Max: 19  Current Pulse Ox[de-identified]  SpO2: 97 %  24HR Pulse Ox Range:  SpO2  Av.8 %  Min: 96 %  Max: 100 %  Oxygen Amount and Delivery: O2 Flow Rate (L/min): 2 L/min    Wt Readings from Last 3 Encounters:   19 232 lb (105.2 kg)   19 232 lb 6 oz (105.4 kg)   19 224 lb 4 oz (101.7 kg)       I/O (24 Hours)    Intake/Output Summary (Last 24 hours) at 2019 1230  Last data filed at 2019 1150  Gross per 24 hour   Intake 825 ml   Output 2475 ml   Net -1650 ml       EXAM     General Appearance  Awake, alert, oriented, in no acute distress  HEENT - normocephalic, atraumatic.  []  Mallampati  [] Crowded airway   [] Macroglossia  []  Retrognathia  [] Micrognathia  []  Normal tongue size []  Normal Bite  [] Frederick sign positive    Neck - Supple,  trachea midline   Lungs -laterally  Cardiovascular - Heart sounds are normal.  Regular rate and rhythm   Abdomen - Soft, nontender, nondistended, no masses or organomegaly  Neurologic - There are no focal motor or sensory deficits  Skin - No bruising or bleeding  Extremities - No clubbing, cyanosis, edema    MEDS      metoprolol tartrate  50 mg Oral BID    nicotine  1 patch Transdermal Daily    gabapentin  600 mg Oral TID    lurasidone  20 mg Oral Daily    sodium chloride flush  10 mL Intravenous 2 times per day    enoxaparin  30 mg Subcutaneous BID    sodium chloride flush  10 mL Intravenous 2 times per day      sodium chloride 125 mL/hr at 06/19/19 2307    sodium chloride Stopped (06/23/19 0803)     acetaminophen, metoprolol, LORazepam **OR** LORazepam **OR** LORazepam **OR** LORazepam **OR** LORazepam **OR** LORazepam **OR** LORazepam **OR** LORazepam, LORazepam, cloNIDine, sodium chloride flush, magnesium hydroxide, ondansetron    LABS   CBC No results for input(s): WBC, HGB, HCT, MCV, PLT in the last 72 hours. BMP:   Lab Results   Component Value Date     06/23/2019    K 4.0 06/23/2019     06/23/2019    CO2 25 06/23/2019    BUN 11 06/23/2019    LABALBU 4.7 06/19/2019    CREATININE 0.84 06/23/2019    CALCIUM 8.8 06/23/2019    GFRAA >60 06/23/2019    LABGLOM >60 06/23/2019     ABGs:  Lab Results   Component Value Date    PHART 7.392 06/19/2019    PO2ART 88.7 06/19/2019    [id number] 37.5 06/19/2019      Lab Results   Component Value Date    MODE NOT REPORTED 06/19/2019     Ionized Calcium:  No results found for: IONCA  Magnesium:    Lab Results   Component Value Date    MG 1.9 06/23/2019     Phosphorus:  No results found for: PHOS     LIVER PROFILE No results for input(s): AST, ALT, LIPASE, BILIDIR, BILITOT, ALKPHOS in the last 72 hours. Invalid input(s): AMYLASE,  ALB  INR No results for input(s): INR in the last 72 hours.   PTT   Lab Results   Component Value Date    APTT 27.6 06/19/2019         RADIOLOGY     (See actual reports for details)    ASSESSMENT/PLAN   Active

## 2019-06-24 ENCOUNTER — HOSPITAL ENCOUNTER (INPATIENT)
Age: 34
LOS: 3 days | Discharge: HOME | DRG: 918 | End: 2019-06-27
Attending: PSYCHIATRY & NEUROLOGY | Admitting: PSYCHIATRY & NEUROLOGY
Payer: OTHER GOVERNMENT

## 2019-06-24 VITALS
RESPIRATION RATE: 16 BRPM | OXYGEN SATURATION: 98 % | HEIGHT: 73 IN | DIASTOLIC BLOOD PRESSURE: 73 MMHG | SYSTOLIC BLOOD PRESSURE: 140 MMHG | BODY MASS INDEX: 30.75 KG/M2 | TEMPERATURE: 98.4 F | HEART RATE: 73 BPM | WEIGHT: 232 LBS

## 2019-06-24 DIAGNOSIS — M54.50 CHRONIC LEFT-SIDED LOW BACK PAIN WITHOUT SCIATICA: Primary | ICD-10-CM

## 2019-06-24 DIAGNOSIS — G89.29 CHRONIC LEFT-SIDED LOW BACK PAIN WITHOUT SCIATICA: Primary | ICD-10-CM

## 2019-06-24 PROBLEM — F32.2 SEVERE MAJOR DEPRESSION (HCC): Status: ACTIVE | Noted: 2019-06-24

## 2019-06-24 LAB
ANION GAP SERPL CALCULATED.3IONS-SCNC: 11 MMOL/L (ref 9–17)
BUN BLDV-MCNC: 8 MG/DL (ref 6–20)
BUN/CREAT BLD: ABNORMAL (ref 9–20)
CALCIUM SERPL-MCNC: 9 MG/DL (ref 8.6–10.4)
CHLORIDE BLD-SCNC: 104 MMOL/L (ref 98–107)
CO2: 25 MMOL/L (ref 20–31)
CREAT SERPL-MCNC: 0.79 MG/DL (ref 0.7–1.2)
GFR AFRICAN AMERICAN: >60 ML/MIN
GFR NON-AFRICAN AMERICAN: >60 ML/MIN
GFR SERPL CREATININE-BSD FRML MDRD: ABNORMAL ML/MIN/{1.73_M2}
GFR SERPL CREATININE-BSD FRML MDRD: ABNORMAL ML/MIN/{1.73_M2}
GLUCOSE BLD-MCNC: 123 MG/DL (ref 70–99)
MAGNESIUM: 1.9 MG/DL (ref 1.6–2.6)
MYOGLOBIN: 22 NG/ML (ref 28–72)
POTASSIUM SERPL-SCNC: 3.9 MMOL/L (ref 3.7–5.3)
SODIUM BLD-SCNC: 140 MMOL/L (ref 135–144)
TOTAL CK: 411 U/L (ref 39–308)

## 2019-06-24 PROCEDURE — 1240000000 HC EMOTIONAL WELLNESS R&B

## 2019-06-24 PROCEDURE — 36415 COLL VENOUS BLD VENIPUNCTURE: CPT

## 2019-06-24 PROCEDURE — 6370000000 HC RX 637 (ALT 250 FOR IP): Performed by: FAMILY MEDICINE

## 2019-06-24 PROCEDURE — 82550 ASSAY OF CK (CPK): CPT

## 2019-06-24 PROCEDURE — 83735 ASSAY OF MAGNESIUM: CPT

## 2019-06-24 PROCEDURE — 80048 BASIC METABOLIC PNL TOTAL CA: CPT

## 2019-06-24 PROCEDURE — 83874 ASSAY OF MYOGLOBIN: CPT

## 2019-06-24 PROCEDURE — 99238 HOSP IP/OBS DSCHRG MGMT 30/<: CPT | Performed by: FAMILY MEDICINE

## 2019-06-24 RX ORDER — CLONIDINE HYDROCHLORIDE 0.1 MG/1
0.1 TABLET ORAL NIGHTLY PRN
Status: DISCONTINUED | OUTPATIENT
Start: 2019-06-24 | End: 2019-06-27 | Stop reason: HOSPADM

## 2019-06-24 RX ORDER — ACETAMINOPHEN 325 MG/1
650 TABLET ORAL EVERY 4 HOURS PRN
Status: DISCONTINUED | OUTPATIENT
Start: 2019-06-24 | End: 2019-06-27 | Stop reason: HOSPADM

## 2019-06-24 RX ORDER — NICOTINE 21 MG/24HR
1 PATCH, TRANSDERMAL 24 HOURS TRANSDERMAL DAILY
Status: CANCELLED | OUTPATIENT
Start: 2019-06-25

## 2019-06-24 RX ORDER — NICOTINE 21 MG/24HR
1 PATCH, TRANSDERMAL 24 HOURS TRANSDERMAL DAILY
Status: DISCONTINUED | OUTPATIENT
Start: 2019-06-24 | End: 2019-06-24

## 2019-06-24 RX ORDER — NICOTINE 21 MG/24HR
1 PATCH, TRANSDERMAL 24 HOURS TRANSDERMAL DAILY
Status: DISCONTINUED | OUTPATIENT
Start: 2019-06-25 | End: 2019-06-24

## 2019-06-24 RX ORDER — METOPROLOL TARTRATE 50 MG/1
50 TABLET, FILM COATED ORAL 2 TIMES DAILY
Qty: 60 TABLET | Refills: 3
Start: 2019-06-24 | End: 2019-07-25

## 2019-06-24 RX ORDER — BENZTROPINE MESYLATE 1 MG/ML
2 INJECTION INTRAMUSCULAR; INTRAVENOUS 2 TIMES DAILY PRN
Status: DISCONTINUED | OUTPATIENT
Start: 2019-06-24 | End: 2019-06-27 | Stop reason: HOSPADM

## 2019-06-24 RX ORDER — GABAPENTIN 600 MG/1
600 TABLET ORAL 3 TIMES DAILY
Status: DISCONTINUED | OUTPATIENT
Start: 2019-06-24 | End: 2019-06-27 | Stop reason: HOSPADM

## 2019-06-24 RX ORDER — METOPROLOL TARTRATE 50 MG/1
50 TABLET, FILM COATED ORAL 2 TIMES DAILY
Status: CANCELLED | OUTPATIENT
Start: 2019-06-24

## 2019-06-24 RX ORDER — GABAPENTIN 600 MG/1
600 TABLET ORAL 3 TIMES DAILY
Status: CANCELLED | OUTPATIENT
Start: 2019-06-24

## 2019-06-24 RX ORDER — NICOTINE 21 MG/24HR
1 PATCH, TRANSDERMAL 24 HOURS TRANSDERMAL DAILY
Qty: 30 PATCH | Refills: 3
Start: 2019-06-24 | End: 2019-07-25

## 2019-06-24 RX ORDER — MAGNESIUM HYDROXIDE/ALUMINUM HYDROXICE/SIMETHICONE 120; 1200; 1200 MG/30ML; MG/30ML; MG/30ML
15 SUSPENSION ORAL EVERY 6 HOURS PRN
Status: DISCONTINUED | OUTPATIENT
Start: 2019-06-24 | End: 2019-06-27 | Stop reason: HOSPADM

## 2019-06-24 RX ORDER — METOPROLOL TARTRATE 50 MG/1
50 TABLET, FILM COATED ORAL 2 TIMES DAILY
Status: DISCONTINUED | OUTPATIENT
Start: 2019-06-24 | End: 2019-06-27 | Stop reason: HOSPADM

## 2019-06-24 RX ORDER — CLONIDINE HYDROCHLORIDE 0.1 MG/1
0.1 TABLET ORAL NIGHTLY PRN
Status: CANCELLED | OUTPATIENT
Start: 2019-06-24

## 2019-06-24 RX ORDER — ACETAMINOPHEN 325 MG/1
650 TABLET ORAL EVERY 4 HOURS PRN
Status: CANCELLED | OUTPATIENT
Start: 2019-06-24

## 2019-06-24 RX ORDER — TRAZODONE HYDROCHLORIDE 50 MG/1
50 TABLET ORAL NIGHTLY PRN
Status: DISCONTINUED | OUTPATIENT
Start: 2019-06-24 | End: 2019-06-27 | Stop reason: HOSPADM

## 2019-06-24 RX ADMIN — ACETAMINOPHEN 650 MG: 325 TABLET, FILM COATED ORAL at 23:14

## 2019-06-24 RX ADMIN — METOPROLOL TARTRATE 50 MG: 50 TABLET ORAL at 21:00

## 2019-06-24 RX ADMIN — METOPROLOL TARTRATE 50 MG: 50 TABLET ORAL at 08:54

## 2019-06-24 RX ADMIN — GABAPENTIN 600 MG: 600 TABLET, FILM COATED ORAL at 08:54

## 2019-06-24 RX ADMIN — GABAPENTIN 600 MG: 600 TABLET, FILM COATED ORAL at 22:48

## 2019-06-24 RX ADMIN — LURASIDONE HYDROCHLORIDE 20 MG: 40 TABLET, FILM COATED ORAL at 08:57

## 2019-06-24 ASSESSMENT — SLEEP AND FATIGUE QUESTIONNAIRES
DIFFICULTY ARISING: NO
RESTFUL SLEEP: NO
DIFFICULTY STAYING ASLEEP: YES
AVERAGE NUMBER OF SLEEP HOURS: 3
DO YOU USE A SLEEP AID: YES
SLEEP PATTERN: EARLY AWAKENING;RESTLESSNESS;DISTURBED/INTERRUPTED SLEEP
DO YOU HAVE DIFFICULTY SLEEPING: YES
DIFFICULTY FALLING ASLEEP: NO

## 2019-06-24 ASSESSMENT — PAIN SCALES - GENERAL
PAINLEVEL_OUTOF10: 8
PAINLEVEL_OUTOF10: 0
PAINLEVEL_OUTOF10: 3

## 2019-06-24 ASSESSMENT — PATIENT HEALTH QUESTIONNAIRE - PHQ9: SUM OF ALL RESPONSES TO PHQ QUESTIONS 1-9: 14

## 2019-06-24 ASSESSMENT — PAIN DESCRIPTION - DESCRIPTORS: DESCRIPTORS: HEADACHE

## 2019-06-24 ASSESSMENT — LIFESTYLE VARIABLES: HISTORY_ALCOHOL_USE: YES

## 2019-06-24 ASSESSMENT — PAIN DESCRIPTION - ONSET: ONSET: ON-GOING

## 2019-06-24 ASSESSMENT — PAIN DESCRIPTION - FREQUENCY: FREQUENCY: INTERMITTENT

## 2019-06-24 ASSESSMENT — PAIN DESCRIPTION - PAIN TYPE: TYPE: ACUTE PAIN

## 2019-06-24 ASSESSMENT — PAIN DESCRIPTION - LOCATION: LOCATION: HEAD

## 2019-06-24 NOTE — BH NOTE
(benefits of quitting, techniques in how to quit, available resources  ( ) Referral for counseling faxed to [facility]                                           ( ) Patient refused counseling  ( ) Patient has not smoked in the last 30 days    Metabolic Screening:    No results found for: LABA1C    No results found for: CHOL  No results found for: TRIG  No results found for: HDL  No components found for: LDLCAL  No results found for: LABVLDL      Body mass index is 29.03 kg/m². BP Readings from Last 2 Encounters:   06/24/19 131/80   06/24/19 (!) 140/73           Pt admitted with followings belongings:  Dentures: None  Vision - Corrective Lenses: None  Hearing Aid: None  Jewelry: None  Body Piercings Removed: N/A  Clothing: None  Were All Patient Medications Collected?: Not Applicable  Other Valuables: None     Valuables sent home withn/a. Valuables placed in safe in security envelope, number:  n/a. Patient's home medications were n/a. Patient oriented to surroundings and program expectations and copy of patient rights given. Received admission packet:  yes. Consents reviewed, signed yes. Refused none. Patient verbalize understanding:  yes.     Patient education on precautions: done                   Ellis Sanders RN

## 2019-06-24 NOTE — BH NOTE
1:1 discussion/interaction with the patient which addressed the following 1) Recognizing danger situations (alcohol use during first month, being around smoke/other smokers or times/situations when the patient routinely smoked or other triggers). 2) Developing coping skills (managing stress, exercising, relaxation breathing, changing routines & distraction techniques to prevent tobacco use).  3) Basic information provided on quitting (benefits of quitting tobacco, how to quit techniques, & available resources to support quitting - including discussion regarding Quitline Referral [phone])

## 2019-06-24 NOTE — DISCHARGE SUMMARY
Family Medicine Discharge Summary    Ela Morton  :  1985  MRN:  [id number]    Admit date:  2019  Discharge date:  2019    Admitting Physician:  Grant Rosas MD    Discharge Diagnoses:    Patient Active Problem List   Diagnosis    Chronic left-sided low back pain without sciatica    Migraine without status migrainosus, not intractable    Severe episode of recurrent major depressive disorder, without psychotic features ([facility]    Overweight (BMI 25.0-29. 9)    Serotonin syndrome    Class 1 obesity due to excess calories without serious comorbidity with body mass index (BMI) of 30.0 to 30.9 in adult    SVT (supraventricular tachycardia) ([facility]       Admission Condition:  poor  Discharged Condition:  fair    Hospital Course:   35 yo admitted after suicide attempt with unknown amount of duloxetine taken at an unknown time. He was treated for serotonin syndrome and rhabdomyolysis with IV hydration and ICU care. He was evaluated by psychiatry and deemed appropriate for hospital admission, though patient was reluctant to be admitted to [facility]. Discharge Medications:       [facility]   Home Medication Instructions [id number]    Printed on:19 0754   Medication Information                      cloNIDine (CATAPRES) 0.1 MG tablet  Take 0.1 mg by mouth nightly as needed (sleep)             gabapentin (NEURONTIN) 600 MG tablet  Take 1 tablet by mouth 3 times daily for 30 days.              lurasidone (LATUDA) 20 MG TABS tablet  Take 20 mg by mouth daily             metoprolol tartrate (LOPRESSOR) 50 MG tablet  Take 1 tablet by mouth 2 times daily             nicotine (NICODERM CQ) 21 MG/24HR  Place 1 patch onto the skin daily             SUMAtriptan (IMITREX) 100 MG tablet  Take 1 tablet by mouth once as needed for Migraine                 Consults:  Critical care, psychiatry    Significant Diagnostic Studies:  labs    Treatments:   IV hydration    Disposition:   Admission to [facility]  Follow up with Sri Quintanilla MD one week after discharge to [facility]. Signed:   Francisco Javier Draper MD  6/24/2019, 7:54 AM

## 2019-06-24 NOTE — CARE COORDINATION
ONGOING DISCHARGE PLAN:    Spoke with patient regarding discharge plan and patient states he is going to [facility] at discharge. Does follow with [facility] for outpatient treatment. Probable discharge today. Remains on IV fluids     Will continue to follow for additional discharge needs.     Electronically signed by Junnie Riedel, RN on 6/24/2019 at 1:50 PM

## 2019-06-24 NOTE — CARE COORDINATION
SW unable to complete psychosocial assessment at this time. SW will attempt to complete assessment at later date.

## 2019-06-24 NOTE — PLAN OF CARE
Problem: Falls - Risk of:  Goal: Will remain free from falls  Description  Will remain free from falls  6/23/2019 0303 by Ani Torres RN  Outcome: Ongoing  Note:   Pt alert and oriented. Pt was instructed on how to use the call light. Pt used call light appropriately. Non-slip socks are in place. Bed is in lowest and locked position. No falls noted this shift. Problem: Suicide risk  Goal: Provide patient with safe environment  Description  Provide patient with safe environment  6/23/2019 0303 by Ani Torres RN  Outcome: Ongoing  Note:   Patient in 1:1 sitter with suicide precautions       Problem: Pain:  Goal: Pain level will decrease  Description  Pain level will decrease  6/23/2019 0303 by Ani Torres RN  Outcome: Ongoing  Note:   Pt did not complain of pain during this shift.
Problem: Falls - Risk of:  Goal: Will remain free from falls  Description  Will remain free from falls  6/23/2019 1625 by Es Inman RN  Outcome: Ongoing     Problem: Falls - Risk of:  Goal: Absence of physical injury  Description  Absence of physical injury  Outcome: Ongoing     Problem: Suicide risk  Goal: Provide patient with safe environment  Description  Provide patient with safe environment  6/23/2019 1625 by Es Inman RN  Outcome: Ongoing     Problem: Health Behavior:  Goal: Ability to verbalize adaptive coping strategies to use when suicidal feelings occur will improve  Description  Ability to verbalize adaptive coping strategies to use when suicidal feelings occur will improve  Outcome: Ongoing     Problem: Health Behavior:  Goal: Ability to verbalize adaptive coping strategies to use when the urge to self-mutilate occurs will improve  Description  Ability to verbalize adaptive coping strategies to use when the urge to self-mutilate occurs will improve  Outcome: Ongoing     Problem: Safety:  Goal: Ability to contract for his/her safety will improve  Description  Ability to contract for his/her safety will improve  Outcome: Ongoing     Problem: Pain:  Goal: Pain level will decrease  Description  Pain level will decrease  6/23/2019 1625 by Es Inman RN  Outcome: Ongoing     Problem: Pain:  Goal: Control of acute pain  Description  Control of acute pain  Outcome: Ongoing     Problem: Pain:  Goal: Control of chronic pain  Description  Control of chronic pain  Outcome: Ongoing
Problem: Falls - Risk of:  Goal: Will remain free from falls  Description  Will remain free from falls  6/24/2019 0605 by Destiny Cuello RN  Outcome: Ongoing  Note:   Pt alert and oriented. Pt was instructed on how to use the call light. Pt used call light appropriately. Non-slip socks are in place. Bed is in lowest and locked position. No falls noted this shift. Problem: Suicide risk  Goal: Provide patient with safe environment  Description  Provide patient with safe environment  6/24/2019 0605 by Destiny Cuello RN  Outcome: Ongoing  Note:   Patient with 1:1 sitter and in suicide precautions     Problem: Pain:  Goal: Pain level will decrease  Description  Pain level will decrease  6/24/2019 0605 by Destiny Cuello RN  Outcome: Ongoing  Note:   Pt did not complain of pain during this shift.
Problem: Falls - Risk of:  Goal: Will remain free from falls  Description  Will remain free from falls  Outcome: Ongoing  Goal: Absence of physical injury  Description  Absence of physical injury  Outcome: Ongoing     Problem: Suicide risk  Goal: Provide patient with safe environment  Description  Provide patient with safe environment  Outcome: Ongoing     Problem: Health Behavior:  Goal: Ability to verbalize adaptive coping strategies to use when suicidal feelings occur will improve  Description  Ability to verbalize adaptive coping strategies to use when suicidal feelings occur will improve  Outcome: Ongoing  Goal: Ability to verbalize adaptive coping strategies to use when the urge to self-mutilate occurs will improve  Description  Ability to verbalize adaptive coping strategies to use when the urge to self-mutilate occurs will improve  Outcome: Ongoing     Problem: Safety:  Goal: Ability to contract for his/her safety will improve  Description  Ability to contract for his/her safety will improve  Outcome: Ongoing     Problem: Pain:  Goal: Pain level will decrease  Description  Pain level will decrease  Outcome: Ongoing  Goal: Control of acute pain  Description  Control of acute pain  Outcome: Ongoing  Goal: Control of chronic pain  Description  Control of chronic pain  Outcome: Ongoing
Problem: Falls - Risk of:  Goal: Will remain free from falls  Description  Will remain free from falls  Outcome: Ongoing  Note:   No attempt to get oob. Safe environment provided. Bed down and locked. Uses call light appropriately      Problem: Suicide risk  Goal: Provide patient with safe environment  Description  Provide patient with safe environment  Outcome: Ongoing  Note:   No attempt to harm self noted.  Sitter remains at bedside
Problem: Safety:  Goal: Ability to contract for his/her safety will improve  Description  Ability to contract for his/her safety will improve  Outcome: Ongoing  Note:   Sitter remains at bedside. No attempt to get out of bed. Bed down and locked. Safe environment provided     Problem: Restraint Use - Nonviolent/Non-Self-Destructive Behavior:  Goal: Absence of restraint indications  Description  Absence of restraint indications  6/20/2019 1857 by Davion Fortune RN  Outcome: Ongoing  Note:   All tubes and lines remain intact. Pt reaches for monitor wires and condom cath when restriants released.  Reassurance given   6/20/2019 1327 by Genaro De La Cruz RN  Outcome: Met This Shift  Note:   Patient is able to follow commands appropriately, no need for restraints at this time
safety will improve  Outcome: Ongoing     Problem: Pain:  Goal: Pain level will decrease  Description  Pain level will decrease  Outcome: Ongoing  Note:   Patient has chronic back pain and a headache.  PRN tylenol given x2 for pain control  Goal: Control of chronic pain  Description  Control of chronic pain  Outcome: Ongoing

## 2019-06-24 NOTE — PROGRESS NOTES
Hermelinda Askew is a 34 y.o. male patient.     Current Facility-Administered Medications   Medication Dose Route Frequency Provider Last Rate Last Dose    metoprolol tartrate (LOPRESSOR) tablet 50 mg  50 mg Oral BID Deborah Sneed MD   50 mg at 06/23/19 1953    acetaminophen (TYLENOL) tablet 650 mg  650 mg Oral Q4H PRN Deborah Sneed MD   650 mg at 06/22/19 0310    metoprolol (LOPRESSOR) injection 5 mg  5 mg Intravenous Q6H PRN Yaneth Holguin MD   5 mg at 06/22/19 0040    LORazepam (ATIVAN) tablet 1 mg  1 mg Oral Q1H PRN Mc Lovell MD   1 mg at 06/21/19 2036    Or    LORazepam (ATIVAN) injection 1 mg  1 mg Intravenous Q1H PRN Mc Lovell MD        Or    LORazepam (ATIVAN) tablet 2 mg  2 mg Oral Q1H PRN Mc Lovell MD   2 mg at 06/21/19 2222    Or    LORazepam (ATIVAN) injection 2 mg  2 mg Intravenous Q1H PRN Mc Lovell MD   2 mg at 06/20/19 0146    Or    LORazepam (ATIVAN) tablet 3 mg  3 mg Oral Q1H PRN Mc Lovell MD   3 mg at 06/22/19 4574    Or    LORazepam (ATIVAN) injection 3 mg  3 mg Intravenous Q1H PRN Mc Lovell MD        Or    LORazepam (ATIVAN) tablet 4 mg  4 mg Oral Q1H PRN Mc Lovell MD        Or    LORazepam (ATIVAN) injection 4 mg  4 mg Intravenous Q1H PRN Mc Lovell MD        nicotine (NICODERM CQ) 21 MG/24HR 1 patch  1 patch Transdermal Daily Yaenth Holguin MD        LORazepam (ATIVAN) injection 0.5 mg  0.5 mg Intravenous Q6H PRN Deborah Sneed MD   0.5 mg at 06/19/19 1835    0.9 % sodium chloride infusion   Intravenous Continuous Sim MD Alin 125 mL/hr at 06/19/19 2307      cloNIDine (CATAPRES) tablet 0.1 mg  0.1 mg Oral Nightly PRN Deborah Sneed MD   0.1 mg at 06/21/19 2034    gabapentin (NEURONTIN) tablet 600 mg  600 mg Oral TID Deborah Sneed MD   600 mg at 06/23/19 1953    lurasidone (LATUDA) tablet 20 mg  20 mg Oral Daily Deborah Sneed MD   20 mg at 06/23/19 2162    sodium chloride flush 0.9 % injection 10 mL  10 mL Intravenous 2 times per day Dhruv Peoples MD        sodium chloride flush 0.9 % injection 10 mL  10 mL Intravenous PRN Dhruv Peoples MD        magnesium hydroxide (MILK OF MAGNESIA) 400 MG/5ML suspension 30 mL  30 mL Oral Daily PRN Dhruv Peoples MD        ondansetron [facility]) injection 4 mg  4 mg Intravenous Q6H PRN Dhruv Peoples MD        enoxaparin (LOVENOX) injection 30 mg  30 mg Subcutaneous BID Dhruv Peoples MD   30 mg at 06/23/19 1954    0.9 % sodium chloride infusion   Intravenous Continuous Dhruv Peoples MD   Stopped at 06/23/19 0803    sodium chloride flush 0.9 % injection 10 mL  10 mL Intravenous 2 times per day Teto Pagan MD   10 mL at 06/21/19 0805     No Known Allergies  Active Problems:    Chronic left-sided low back pain without sciatica    Migraine without status migrainosus, not intractable    Severe episode of recurrent major depressive disorder, without psychotic features ([facility])    Serotonin syndrome    Class 1 obesity due to excess calories without serious comorbidity with body mass index (BMI) of 30.0 to 30.9 in adult    SVT (supraventricular tachycardia) ([facility]  Resolved Problems:    * No resolved hospital problems. *    Blood pressure (!) 140/73, pulse 73, temperature 98.4 °F (36.9 °C), temperature source Oral, resp. rate 16, height 6' 1\" (1.854 m), weight 232 lb (105.2 kg), SpO2 98 %. Subjective:  Symptoms:  Resolved. Diet:  Adequate intake. Activity level: Normal.    Pain:  He reports no pain. Objective:  General Appearance:  Comfortable. Vital signs: (most recent): Blood pressure (!) 140/73, pulse 73, temperature 98.4 °F (36.9 °C), temperature source Oral, resp. rate 16, height 6' 1\" (1.854 m), weight 232 lb (105.2 kg), SpO2 98 %. Vital signs are normal.      Assessment & Plan  Serotonin syndrome/rhabdomyolysis - CK much improved.   Hypertension/tachycardia - continue metoprolol    Transfer to Judy Gomez MD  6/24/2019

## 2019-06-24 NOTE — BH NOTE
RT assessment unable to be completed at this time due to pt refusal. Assessment will be completed at a later date.

## 2019-06-24 NOTE — DISCHARGE INSTR - COC
Continuity of Care Form    Patient Name: Ralf Kendrick   :  1985  MRN:  [id number]    Admit date:  2019  Discharge date:  ***    Code Status Order: Full Code   Advance Directives:   Advance Care Flowsheet Documentation     Date/Time Healthcare Directive Type of Healthcare Directive Copy in [address] Agent's Name Healthcare Agent's Phone Number    19 2142  No, patient does not have an advance directive for healthcare treatment -- -- -- -- --          Admitting Physician:  Jerrald Schilder, MD  PCP: Jerrald Schilder, MD    Discharging Nurse: [facility] Unit/Room#: 2092/2092-01  Discharging Unit Phone Number: ***    Emergency Contact:   Extended Emergency Contact Information  Primary Emergency Contact: [address] Phone: [phone]  Mobile Phone: [phone]  Relation: Other    Past Surgical History:  History reviewed. No pertinent surgical history. Immunization History: There is no immunization history on file for this patient. Active Problems:  Patient Active Problem List   Diagnosis Code    Chronic left-sided low back pain without sciatica M54.5, G89.29    Migraine without status migrainosus, not intractable G43.909    Severe episode of recurrent major depressive disorder, without psychotic features ([facility] [address] [address] F33.2    Overweight (BMI 25.0-29. 9) E66.3    Serotonin syndrome G25.79    Class 1 obesity due to excess calories without serious comorbidity with body mass index (BMI) of 30.0 to 30.9 in adult E66.09, Z68.30    SVT (supraventricular tachycardia) ([facility]) I47.1       Isolation/Infection:   Isolation          No Isolation            Nurse Assessment:  Last Vital Signs: BP (!) 140/73   Pulse 73   Temp 98.4 °F (36.9 °C) (Oral)   Resp 16   Ht 6' 1\" (1.854 m)   Wt 232 lb (105.2 kg)   SpO2 98%   BMI 30.61 kg/m²     Last documented pain score (0-10 scale): Pain Level: 0  Last Weight:    Wt Readings from Last 1 Encounters:   19 232 lb (105.2 kg)     Mental Status:  {IP PT MENTAL STATUS:}    IV Access:  { KRISTA IV ACCESS:601347676}    Nursing Mobility/ADLs:  Walking   {CHP DME HHDF:796344377}  Transfer  {CHP DME NRLT:687157722}  Bathing  {CHP DME WYPZ:362376576}  Dressing  {CHP DME UFUA:093398182}  Toileting  {CHP DME CWQO:863438250}  Feeding  {CHP DME XOPH:443136542}  Med Admin  {CHP DME LKSX:080349810}  Med Delivery   { KRISTA MED Delivery:320751907}    Wound Care Documentation and Therapy:        Elimination:  Continence:   · Bowel: {YES / QH:98790}  · Bladder: {YES / JL:83274}  Urinary Catheter: {Urinary Catheter:778984025}   Colostomy/Ileostomy/Ileal Conduit: {YES / GE:10101}       Date of Last BM: ***    Intake/Output Summary (Last 24 hours) at 2019 0754  Last data filed at 2019 1904  Gross per 24 hour   Intake 550 ml   Output --   Net 550 ml     I/O last 3 completed shifts:   In: 65 [P.O.:825]  Out: -     Safety Concerns:     [address] Safety Concerns:746810624}    Impairments/Disabilities:      [address] Impairments/Disabilities:493763086}    Nutrition Therapy:  Current Nutrition Therapy:   [address] Diet List:758863208}    Routes of Feeding: {[facility] DME Other Feedings:949938533}  Liquids: {Slp liquid thickness:88650}  Daily Fluid Restriction: {CHP DME Yes amt example:202433530}  Last Modified Barium Swallow with Video (Video Swallowing Test): {Done Not Done ZYZX:076782952}    Treatments at the Time of Hospital Discharge:   Respiratory Treatments: ***  Oxygen Therapy:  {Therapy; copd oxygen:16470}  Ventilator:    { CC Vent JIFU:150840553}    Rehab Therapies: {THERAPEUTIC INTERVENTION:4205323334}  Weight Bearing Status/Restrictions: [address] [employer]  Weight Bearin}  Other Medical Equipment (for information only, NOT a DME order):  {EQUIPMENT:153406770}  Other Treatments: ***    Patient's personal belongings (please select all that are sent with patient):  {[facility] DME Belongings:860395344}    RN SIGNATURE:  {Esignature:141803065}    CASE MANAGEMENT/SOCIAL WORK SECTION    Inpatient Status Date: ***    Readmission Risk Assessment Score:  Readmission Risk              Risk of Unplanned Readmission:        12           Discharging to Facility/ Agency   · Name:   · Address:  · Phone:  · Fax:    Dialysis Facility (if applicable)   · Name:  · Address:  · Dialysis Schedule:  · Phone:  · Fax:    / signature: {Esignature:155278413}    PHYSICIAN SECTION    Prognosis: {Prognosis:1316177325}    Condition at Discharge: [address] [place] [address] Patient Condition:726596034}    Rehab Potential (if transferring to Rehab): {Prognosis:9690548558}    Recommended Labs or Other Treatments After Discharge: ***    Physician Certification: I certify the above information and transfer of Rebecca Nathan  is necessary for the continuing treatment of the diagnosis listed and that he requires {Admit to Appropriate Level of Care:10535} for {GREATER/LESS:839541580} 30 days.      Update Admission H&P: {CHP DME Changes in PMXUB:514198272}    PHYSICIAN SIGNATURE:  {Esignature:483959375}

## 2019-06-25 PROBLEM — F32.2 SEVERE MAJOR DEPRESSION (HCC): Chronic | Status: ACTIVE | Noted: 2019-06-24

## 2019-06-25 PROCEDURE — 6370000000 HC RX 637 (ALT 250 FOR IP): Performed by: PSYCHIATRY & NEUROLOGY

## 2019-06-25 PROCEDURE — 6370000000 HC RX 637 (ALT 250 FOR IP): Performed by: FAMILY MEDICINE

## 2019-06-25 PROCEDURE — 90792 PSYCH DIAG EVAL W/MED SRVCS: CPT | Performed by: PSYCHIATRY & NEUROLOGY

## 2019-06-25 PROCEDURE — 1240000000 HC EMOTIONAL WELLNESS R&B

## 2019-06-25 RX ORDER — BUPROPION HYDROCHLORIDE 150 MG/1
150 TABLET ORAL DAILY
Status: DISCONTINUED | OUTPATIENT
Start: 2019-06-25 | End: 2019-06-27 | Stop reason: HOSPADM

## 2019-06-25 RX ADMIN — GABAPENTIN 600 MG: 600 TABLET, FILM COATED ORAL at 08:07

## 2019-06-25 RX ADMIN — GABAPENTIN 600 MG: 600 TABLET, FILM COATED ORAL at 22:04

## 2019-06-25 RX ADMIN — METOPROLOL TARTRATE 50 MG: 50 TABLET ORAL at 22:04

## 2019-06-25 RX ADMIN — LURASIDONE HYDROCHLORIDE 20 MG: 40 TABLET, FILM COATED ORAL at 08:07

## 2019-06-25 RX ADMIN — ACETAMINOPHEN 650 MG: 325 TABLET, FILM COATED ORAL at 08:07

## 2019-06-25 RX ADMIN — GABAPENTIN 600 MG: 600 TABLET, FILM COATED ORAL at 14:37

## 2019-06-25 RX ADMIN — BUPROPION HYDROCHLORIDE 150 MG: 150 TABLET, FILM COATED, EXTENDED RELEASE ORAL at 11:02

## 2019-06-25 RX ADMIN — METOPROLOL TARTRATE 50 MG: 50 TABLET ORAL at 08:07

## 2019-06-25 ASSESSMENT — LIFESTYLE VARIABLES: HISTORY_ALCOHOL_USE: YES

## 2019-06-25 ASSESSMENT — PAIN SCALES - GENERAL
PAINLEVEL_OUTOF10: 6
PAINLEVEL_OUTOF10: 2

## 2019-06-25 NOTE — BH NOTE
Psychiatric Admission Note         Terri Red is a 34 y.o. male who was admitted from internal medicine unit. He apparently overdosed on his medications in an attempt to commit suicide. They have treated him for rhabdomyolysis and his condition is stabilized. Because of his depression and suicidal thoughts he is admitted to psychiatric unit. He complains of lack of energy and motivation. He feels depressed and sad. He feels hopeless and useless. He complains of lack of energy and motivation. He has suicidal thoughts. Past Psychiatric History   Patient reports current outpatient psychiatric linkage. . Reported history of psychiatric inpatient hospitalizations. Reported history of suicide attempts. History of Substance Abuse     Denies alcohol use or use of any illicit drugs. Family History of psychiatric disorders    Family history: denied       Medical History   Allergies:  Patient has no known allergies. History reviewed. No pertinent past medical history. History reviewed. No pertinent surgical history.     Labs  Recent Results (from the past 72 hour(s))   Basic Metabolic Panel w/ Reflex to MG    Collection Time: 06/23/19  5:38 AM   Result Value Ref Range    Glucose 100 (H) 70 - 99 mg/dL    BUN 11 6 - 20 mg/dL    CREATININE 0.84 0.70 - 1.20 mg/dL    Bun/Cre Ratio NOT REPORTED 9 - 20    Calcium 8.8 8.6 - 10.4 mg/dL    Sodium 140 135 - 144 mmol/L    Potassium 4.0 3.7 - 5.3 mmol/L    Chloride 103 98 - 107 mmol/L    CO2 25 20 - 31 mmol/L    Anion Gap 12 9 - 17 mmol/L    GFR Non-African American >60 >60 mL/min    GFR African American >60 >60 mL/min    GFR Comment          GFR Staging NOT REPORTED    Magnesium    Collection Time: 06/23/19  5:38 AM   Result Value Ref Range    Magnesium 1.9 1.6 - 2.6 mg/dL   CK    Collection Time: 06/23/19  5:38 AM   Result Value Ref Range    Total CK 1,198 (H) 39 - 308 U/L   Myoglobin    Collection Time: 06/23/19  5:38 AM used in portions of this document.  Occasionally words are mis-transcribed

## 2019-06-25 NOTE — PLAN OF CARE
Pt has a flat, sad affect, depressed mood. Active in [facility], attends groups, talks with staff for all of his assessments. Relates openly during one to ones. Continues to struggle with depression but is open to treatment and working on his issues.

## 2019-06-25 NOTE — GROUP NOTE
Group Therapy Note    Date: June 25    Group Start Time: 0900  Group End Time: 0930  Group Topic: Community Meeting    [facility] [address], [address]        Group Therapy Note    Attendees: 8         Patient's Goal:  To increase social skills     Notes:  Patient attended group and participated fully. Status After Intervention:  Improved    Participation Level:  Active Listener and Interactive    Participation Quality: Appropriate, Attentive and Sharing      Speech:  normal      Thought Process/Content: Logical      Affective Functioning: Congruent      Mood: euthymic      Level of consciousness:  Alert and Oriented x4      Response to Learning: Progressing to goal      Endings: None Reported    Modes of Intervention: Education, Support and Socialization      Discipline Responsible: Psychoeducational Specialist      Signature:  Zara Rockwell

## 2019-06-25 NOTE — PLAN OF CARE
Pt denies any current SI, but admits to having fleeting SI prior to now. Has a history of SI. No self harming behaviors noted. Controlled and coop. Good appetite, takes a shower. Very sad and depressed at this time.
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
## 2019-06-25 NOTE — BH NOTE
Patient refused to attend smoking cessation group at 1600 after encouragement from staff. 1:1 talk time offered as alternative to group session.

## 2019-06-25 NOTE — H&P
HISTORY and [facility] [address]       NAME:  Huma Mercado  MRN: [id number]   YOB: 1985   Date: 6/25/2019   Age: 34 y.o. Gender: male     H&P Update Note    H&P from 06/19/2019  reviewed and updated, Patient examined. Vitals: /87   Pulse 73   Temp 98.3 °F (36.8 °C)   Resp 14   Ht 6' 1\" (1.854 m)   Wt 220 lb (99.8 kg)   SpO2 100%   BMI 29.03 kg/m²  Body mass index is 29.03 kg/m². Huma Mercado is 34 y.o. , male, admitted because of pression. Patient attempted suicide overdose on pills, patient is going through divorce  for 3 years no children. Patient's been  for 6 months. Patient states that this is his first suicide attempt no homicidal ideations, has poor sleep fair appetite and concentration,  No auditory visual hallucinations. Patient has been compliant with his medications. Patient consumes 2-3 beers on and off does not use any street drugs lives with roommates, currently not working. I concur with the findings. PROVISIONAL DIAGNOSES / SURGERY:      Pression, suicide attempt.     Patient Active Problem List    Diagnosis Date Noted    Severe major depression ([facility] 06/24/2019    Class 1 obesity due to excess calories without serious comorbidity with body mass index (BMI) of 30.0 to 30.9 in adult 06/20/2019    SVT (supraventricular tachycardia) (HCC)     Serotonin syndrome 06/19/2019    Overweight (BMI 25.0-29.9) 03/04/2019    Chronic left-sided low back pain without sciatica 02/07/2019    Migraine without status migrainosus, not intractable 02/07/2019    Severe episode of recurrent major depressive disorder, without psychotic features ([facility] 02/07/2019           López Flor PA-C on 6/25/2019 at 4:06 PM              Family Medicine Admit Note    PCP: Eliza Valentino MD    Date of Admission: 6/24/2019    Date of Service: Pt seen/examined on 6/20/2019 and Admitted to Inpatient    Chief tobacco use included chew. ETOH:   reports that he drinks about 8.4 oz of alcohol per week. Family History:      History reviewed. No pertinent family history. PHYSICAL EXAM:    /87   Pulse 73   Temp 98.3 °F (36.8 °C)   Resp 14   Ht 6' 1\" (1.854 m)   Wt 220 lb (99.8 kg)   SpO2 100%   BMI 29.03 kg/m²     General appearance: asleep, wrist restraints in place, comfortable. Sister and a male family member present. HEENT Normal cephalic, atraumatic without obvious deformity. Neck: Supple, No jugular venous distention/bruits. Lungs: Clear to auscultation, bilaterally without rales/wheezes/rhonchi with good respiratory effort. Heart: Regular rate and rhythm with Normal S1/S2 without murmurs, rubs or gallops  Abdomen: Soft, non-tender or non-distended without rigidity or guarding and positive bowel sounds all four quadrants. Mental status: asleep    CXR:  I have reviewed the CXR with the following interpretation: 1. Mild mediastinal widening which may in part relate to portable AP  technique. 2. Slight rightward tracheal deviation. 3. No focal airspace disease or edema  CT head - no acute abnormality  EKG:  I have reviewed the EKG with the following interpretation: SVT. Prolonged QT    CBC   No results for input(s): WBC, HGB, HCT, PLT in the last 72 hours. RENAL  Recent Labs     06/23/19  0538 06/24/19  0557    140   K 4.0 3.9    104   CO2 25 25   BUN 11 8   CREATININE 0.84 0.79     LFT'S  No results for input(s): AST, ALT, ALB, BILIDIR, BILITOT, ALKPHOS in the last 72 hours. COAG  No results for input(s): INR in the last 72 hours.   CARDIAC ENZYMES  Recent Labs     06/23/19  0538 06/24/19  0557   CKTOTAL 1,198* 411*       U/A:    Lab Results   Component Value Date    COLORU YELLOW 06/21/2019    SPECGRAV 1.007 06/21/2019    LEUKOCYTESUR NEGATIVE 06/21/2019    GLUCOSEU NEGATIVE 06/21/2019       ABG    Lab Results   Component Value Date    OUO7UVI 22.8 06/19/2019    Q6EVFUTL 96.5 06/19/2019    PHART 7.392 06/19/2019    VTX2GZR 37.5 06/19/2019    PO2ART 88.7 06/19/2019           Active Hospital Problems    Diagnosis Date Noted    Severe major depression ([facility] [F32.2] 06/24/2019         ASSESSMENT/PLAN:    Serotonin syndrome d/t duloxetine overdose - elevated myoglobin and CK. Aggressive IV hydration. Consult critical care. Chronic back pain  Frequent migraine  Severe major depression/PTSD - he is seeing psychiatrist. Psychiatric consult.       DVT Prophylaxis: enoxaparin  Diet: DIET GENERAL;  Code Status: Full Code      Dispo - admitted to ICU      Rose Marie Coppola MD   6/25/2019, 4:06 PM

## 2019-06-25 NOTE — GROUP NOTE
Group Therapy Note    Date: June 25    Group Start Time: 1330  Group End Time: 1400  Group Topic: Psychoeducation    [address], [place]        Group Therapy Note    Attendees: 5         Patient's Goal:   Patient will demonstrate increased interpersonal interaction       Notes:  Patient attended group and participated fully     Status After Intervention:  Improved    Participation Level:  Active Listener and Interactive    Participation Quality: Appropriate, Attentive and Sharing      Speech:  normal      Thought Process/Content: Logical      Affective Functioning: Congruent      Mood: euthymic      Level of consciousness:  Alert and Oriented x4      Response to Learning: Progressing to goal      Endings: None Reported    Modes of Intervention: Education, Support and Socialization      Discipline Responsible: Psychoeducational Specialist      Signature:  Zara Rockwell

## 2019-06-25 NOTE — PLAN OF CARE
[facility]  Initial Interdisciplinary Treatment Plan NO      Original treatment plan Date & Time: 6/25/19 0827    Admission Type:  Admission Type: Voluntary    Reason for admission:   Reason for Admission: Patient overdosed on unknown amount of celexa, had seratonin syndrome - was admitted to PCU 6/19. Active  status with [employer]. More depressed since divorce in January of 2019. Estimated Length of Stay:  5-7days  Estimated Discharge Date: to be determined by physician    PATIENT STRENGTHS:  Patient Strengths:Strengths: Employment, Communication  Patient Strengths and Limitations:Limitations: Hopeless about future, Tendency to isolate self  Addictive Behavior: Addictive Behavior  In the past 3 months, have you felt or has someone told you that you have a problem with:  : None  Do you have a history of Chemical Use?: No  Do you have a history of Alcohol Use?: Yes  Do you have a history of Street Drug Abuse?: No  Histroy of Prescripton Drug Abuse?: No  Medical Problems:History reviewed. No pertinent past medical history.   Status EXAM:Status and Exam  Normal: No  Facial Expression: Flat, Sad  Affect: Appropriate  Level of Consciousness: Alert  Mood:Normal: No  Mood: Depressed, Helpless  Motor Activity:Normal: Yes  Interview Behavior: Cooperative  Preception: [place] to Person, [place] to Time, [place] to Place, [place] to Situation  Attention:Normal: Yes  Thought Processes: Circumstantial  Thought Content:Normal: No  Thought Content: Preoccupations, Poverty of Content  Hallucinations: None  Delusions: No  Memory:Normal: Yes  Insight and Judgment: No  Insight and Judgment: Poor Judgment, Poor Insight  Present Suicidal Ideation: No  Present Homicidal Ideation: No    EDUCATION:   Learner Progress Toward Treatment Goals: reviewed group plans and strategies for care    Method:group therapy, medication compliance, individualized assessments and care planning    Outcome: needs reinforcement    PATIENT GOALS: to be discussed with patient within 72 hours    PLAN/TREATMENT RECOMMENDATIONS:     continue group therapy , medications compliance, goal setting, individualized assessments and care, continue to monitor pt on unit      SHORT-TERM GOALS:   Time frame for Short-Term Goals: 5-7 days    LONG-TERM GOALS:  Time frame for Long-Term Goals: 6 months  Members Present in Team Meeting: See Signature Sheet    Abimael Terry

## 2019-06-25 NOTE — GROUP NOTE
Group Therapy Note    Date: June 24    Group Start Time: 2100  Group End Time: 2130  Group Topic: Wrap-Up    [facility]    Dia Richardson RN      Group Therapy Note    Attendees: 14/21     Patient's Goals:    1. To be able to reflect on daily unit activities/experiences. 2.  To review accomplished goal and encourage patient to set a new daily goal.  3.  To have a better understanding of oneself. 4.  To be able to identify their strengths and weaknesses and to utilize their strengths and cope with their weaknesses. Notes:  Pt attended and participated in Wrap-Up/Self-Awareness groups this evening. Status After Intervention:  Improved    Participation Level:  Active Listener and Interactive    Participation Quality: Appropriate, Attentive and Sharing    Speech:  normal    Thought Process/Content: Logical    Affective Functioning: Congruent    Mood: euthymic    Level of consciousness:  Alert, Oriented x4 and Attentive    Response to Learning: Able to verbalize current knowledge/experience, Able to verbalize/acknowledge new learning, Able to retain information and Capable of insight    Endings: None Reported    Modes of Intervention: Education, Socialization and Exploration    Discipline Responsible: Registered Nurse      Signature:  Dia Richardson RN

## 2019-06-26 LAB
MYOGLOBIN: 25 NG/ML (ref 28–72)
TOTAL CK: 122 U/L (ref 39–308)

## 2019-06-26 PROCEDURE — 99232 SBSQ HOSP IP/OBS MODERATE 35: CPT | Performed by: PSYCHIATRY & NEUROLOGY

## 2019-06-26 PROCEDURE — 82550 ASSAY OF CK (CPK): CPT

## 2019-06-26 PROCEDURE — 36415 COLL VENOUS BLD VENIPUNCTURE: CPT

## 2019-06-26 PROCEDURE — 1240000000 HC EMOTIONAL WELLNESS R&B

## 2019-06-26 PROCEDURE — 6370000000 HC RX 637 (ALT 250 FOR IP): Performed by: PSYCHIATRY & NEUROLOGY

## 2019-06-26 PROCEDURE — 6370000000 HC RX 637 (ALT 250 FOR IP): Performed by: FAMILY MEDICINE

## 2019-06-26 PROCEDURE — 83874 ASSAY OF MYOGLOBIN: CPT

## 2019-06-26 RX ADMIN — GABAPENTIN 600 MG: 600 TABLET, FILM COATED ORAL at 14:32

## 2019-06-26 RX ADMIN — GABAPENTIN 600 MG: 600 TABLET, FILM COATED ORAL at 08:27

## 2019-06-26 RX ADMIN — METOPROLOL TARTRATE 50 MG: 50 TABLET ORAL at 08:27

## 2019-06-26 RX ADMIN — GABAPENTIN 600 MG: 600 TABLET, FILM COATED ORAL at 20:39

## 2019-06-26 RX ADMIN — METOPROLOL TARTRATE 50 MG: 50 TABLET ORAL at 20:39

## 2019-06-26 RX ADMIN — ACETAMINOPHEN 650 MG: 325 TABLET, FILM COATED ORAL at 01:03

## 2019-06-26 RX ADMIN — BUPROPION HYDROCHLORIDE 150 MG: 150 TABLET, FILM COATED, EXTENDED RELEASE ORAL at 08:28

## 2019-06-26 RX ADMIN — LURASIDONE HYDROCHLORIDE 20 MG: 40 TABLET, FILM COATED ORAL at 19:02

## 2019-06-26 ASSESSMENT — PAIN DESCRIPTION - LOCATION: LOCATION: HEAD

## 2019-06-26 ASSESSMENT — PAIN SCALES - GENERAL
PAINLEVEL_OUTOF10: 0
PAINLEVEL_OUTOF10: 6

## 2019-06-26 ASSESSMENT — PAIN DESCRIPTION - PAIN TYPE: TYPE: ACUTE PAIN

## 2019-06-26 NOTE — BH NOTE
0.1 mg Oral Nightly PRN Shyanne Kaplan MD        gabapentin (NEURONTIN) tablet 600 mg  600 mg Oral TID Shyanne Kaplan MD   600 mg at 06/26/19 0827    magnesium hydroxide (MILK OF MAGNESIA) 400 MG/5ML suspension 30 mL  30 mL Oral Daily PRN Shyanne Kaplan MD        acetaminophen (TYLENOL) tablet 650 mg  650 mg Oral Q4H PRN Shyanne Kaplan MD   650 mg at 06/26/19 0103    metoprolol tartrate (LOPRESSOR) tablet 50 mg  50 mg Oral BID Shyanne Kaplan MD   50 mg at 06/26/19 0827    traZODone (DESYREL) tablet 50 mg  50 mg Oral Nightly PRN Caitlyn Gamez MD        benztropine mesylate (COGENTIN) injection 2 mg  2 mg Intramuscular BID PRN Caitlyn Gamez MD        aluminum & magnesium hydroxide-simethicone (MAALOX) 200-200-20 MG/5ML suspension 15 mL  15 mL Oral Q6H PRN Caitlyn Gamez MD             lurasidone  20 mg Oral Dinner    buPROPion  150 mg Oral Daily    gabapentin  600 mg Oral TID    metoprolol tartrate  50 mg Oral BID       ASSESSMENT  Severe major depression ([address]     Patient's Response to Treatment: negative    PLAN  Dragon voice recognition software used in portions of this document. Continue current management.

## 2019-06-26 NOTE — GROUP NOTE
Group Therapy Note    Date: June 26    Group Start Time: 1330  Group End Time: 2241  Group Topic: Psychoeducation    [facility]    HUY Edmondson    Group Therapy Note    Attendees: 8       Patient's Goal:  To increase coping skills and self-advocacy. Notes:  Pt attended group and participated appropriately. Status After Intervention:  Improved    Participation Level:  Active Listener and Interactive    Participation Quality: Appropriate, Attentive and Sharing      Speech:  normal      Thought Process/Content: Logical      Affective Functioning: Congruent      Mood: depressed      Level of consciousness:  Alert, Oriented x4 and Attentive      Response to Learning: Able to verbalize current knowledge/experience, Able to verbalize/acknowledge new learning, Able to retain information, Capable of insight and Progressing to goal      Endings: None Reported    Modes of Intervention: Education, Support, Socialization, Exploration, Clarifying, Confrontation, Limit-setting and Reality-testing      Discipline Responsible: Psychoeducational Specialist      Signature:  HUY Edmondson

## 2019-06-26 NOTE — PLAN OF CARE
Problem: Altered Mood, Depressive Behavior:  Goal: Able to verbalize and/or display a decrease in depressive symptoms  Description  Able to verbalize and/or display a decrease in depressive symptoms  6/26/2019 1045 by Meenakshi Ontiveros LPN  Outcome: Met This Shift  Note:   Pt denies depression or anxiety this shift. 6/26/2019 0545 by Will Retana RN  Outcome: Ongoing  Note:   Patient remained isolative to his room all evening. Pt is very pleasant during 1:1 talk time. Pt denies current thoughts of self harm and is agreeable to seeking out should thoughts of self harm arise. Pt is medication compliant. Safe environment maintained. Q15 minute checks for safety cont per unit policy. Will cont to monitor for safety and provide support and reassurance as needed. Problem: Altered Mood, Depressive Behavior:  Goal: Ability to disclose and discuss suicidal ideas will improve  Description  Ability to disclose and discuss suicidal ideas will improve  Outcome: Met This Shift  Note:   Pt denies suicidal or homicidal ideation this shift. Problem: Altered Mood, Depressive Behavior:  Goal: Absence of self-harm  Description  Absence of self-harm  Outcome: Met This Shift  Note:   Pt has been out in milieu & interactive with select peers & staff, also has attended group meetings today. Problem: Suicide risk  Goal: Provide patient with safe environment  Description  Provide patient with safe environment  Outcome: Met This Shift  Note:   Frequent observation / rounds to insure pt safety. Pt has not attempted to do self-harm this shift.

## 2019-06-26 NOTE — PLAN OF CARE
[address] [place] [address]  Day 3 Interdisciplinary Treatment Plan NOTE    Review Date & Time: 6/26/19 1300    Admission Type:   Admission Type: Voluntary    Reason for admission:  Reason for Admission: Patient overdosed on unknown amount of celexa, had seratonin syndrome - was admitted to PCU 6/19. Active  status with [employer]. More depressed since divorce in January of 2019. Estimated Length of Stay: 5-7 days  Estimated Discharge Date Update: to be determined by physician    PATIENT STRENGTHS:  Patient Strengths Strengths: Employment, Communication  Patient Strengths and Limitations:Limitations: Unrealistic self-view, Apathetic / unmotivated  Addictive Behavior:Addictive Behavior  In the past 3 months, have you felt or has someone told you that you have a problem with:  : None  Do you have a history of Chemical Use?: No  Do you have a history of Alcohol Use?: Yes  Do you have a history of Street Drug Abuse?: No  Histroy of Prescripton Drug Abuse?: No  Medical Problems:History reviewed. No pertinent past medical history.     Risk:  Fall RiskTotal: 63  Ubaldo Scale Ubaldo Scale Score: 22  BVC Total: 0  Change in scores no Changes to plan of Care no    Status EXAM:   Status and Exam  Normal: No  Facial Expression: Flat  Affect: Appropriate  Level of Consciousness: Alert  Mood:Normal: Yes  Mood: Depressed, Sad, Helpless, Anxious  Motor Activity:Normal: Yes  Interview Behavior: Cooperative  Preception: [place] to Person, [place] to Time, [place] to Place, [place] to Situation  Attention:Normal: Yes  Thought Processes: Circumstantial  Thought Content:Normal: Yes  Thought Content: Preoccupations  Hallucinations: None  Delusions: No  Memory:Normal: Yes  Memory: Poor Recent  Insight and Judgment: No  Insight and Judgment: Poor Insight  Present Suicidal Ideation: No  Present Homicidal Ideation: No    Daily Assessment Last Entry:   Daily Sleep (WDL): Within Defined Limits         Patient Currently in Pain: Denies  Daily Nutrition (WDL): Within Defined Limits    Patient Monitoring:  Frequency of Checks: 4 times per hour, close    Psychiatric Symptoms:   Depression Symptoms  Depression Symptoms: No problems reported or observed. Anxiety Symptoms  Anxiety Symptoms: No problems reported or observed. Rxoane Symptoms  Roxane Symptoms: No problems reported or observed. Psychosis Symptoms  Delusion Type: No problems reported or observed. Suicide Risk CSSR-S:  1) Within the past month, have you wished you were dead or wished you could go to sleep and not wake up? : Yes  2) Have you actually had any thoughts of killing yourself? : Yes  3) Have you been thinking about how you might kill yourself? : Yes  5) Have you started to work out or worked out the details of how to kill yourself? Do you intend to carry out this plan? : Yes  6) Have you ever done anything, started to do anything, or prepared to do anything to end your life?: Yes  Change in Result no Change in Plan of care no      EDUCATION:   EDUCATION:   Learner Progress Toward Treatment Goals: Reviewed results and recommendations of this team, Reviewed group plan and strategies, Reviewed signs, symptoms and risk of self harm and violent behavior, Reviewed goals and plan of care    Method:small group, individual verbal education    Outcome:verbalized by patient, but needs reinforcement to obtain goals    PATIENT GOALS:  Short term: To continue to open up about his emotions   Long term: To increase his self esteem and continue to go to therapy     PLAN/TREATMENT RECOMMENDATIONS UPDATE: continue with group therapies, increased socialization, continue planning for after discharge goals, continue with medication compliance    SHORT-TERM GOALS UPDATE:   Time frame for Short-Term Goals: 5-7 days    LONG-TERM GOALS UPDATE:   Time frame for Long-Term Goals: 6 months  Members Present in Team Meeting: See [address] [place] [address]

## 2019-06-26 NOTE — GROUP NOTE
Group Therapy Note    Date: June 26    Group Start Time: 1100  Group End Time: 6611  Group Topic: Psychotherapy    [address] LPC    Group Therapy Note    Attendees: 4 /10    Patient's Goal:  relationships and building bridges    Notes:  participated in group    Status After Intervention:  improved    Participation Level:  Active Listener participated in group    Participation Quality: active and supportive    Speech:  normal     Thought Process/Content: Logical    Affective Functioning: Euthymic    Mood: Anxious    Level of consciousness:  Alert    Response to Learning: Progressing to goal - attended group    Endings: None Reported    Modes of Intervention: Support, Exploration and Problem-solving    Discipline Responsible: /Counselor      Signature:  Sallie Jaramillo LPC

## 2019-06-26 NOTE — GROUP NOTE
Group Therapy Note    Date: June 26    Group Start Time: 0900  Group End Time: 0920  Group Topic: Community Meeting    [facility]    [facility]    Group Therapy Note    Attendees: 4         Patient's Goal: \"Continue reading my book\"     Notes:  Pt attended group and participated appropriately. Status After Intervention:  Improved    Participation Level:  Active Listener and Interactive    Participation Quality: Appropriate, Attentive and Sharing      Speech:  normal      Thought Process/Content: Logical      Affective Functioning: Flat      Mood: dysphoric      Level of consciousness:  Alert, Oriented x4 and Attentive      Response to Learning: Able to verbalize current knowledge/experience, Able to verbalize/acknowledge new learning, Able to retain information, Capable of insight and Progressing to goal      Endings: None Reported    Modes of Intervention: Education, Support, Socialization, Exploration, Clarifying, Limit-setting and Reality-testing      Discipline Responsible: Psychoeducational Specialist      Signature:  [facility]

## 2019-06-26 NOTE — PROGRESS NOTES
Pharmacy Med Education Group Note    Date: 6/26/19  Start Time: 1327  End Time: 0932    Number Participants in Group:  3    Goal:  Patient will demonstrate an understanding of the medications intended purpose and possible adverse effects  Topic: [place] for Pharmacy Med Ed Group    Discipline Responsible:     OT  AT  [facility].  RT     X Other       Participation Level:     None  Minimal      X Active Listener    X Interactive    Monopolizing         Participation Quality:    X Appropriate  Inappropriate     X       Attentive        Intrusive          Sharing        Resistant          Supportive        Lethargic       Affective:     X Congruent  Incongruent  Blunted  Flat    Constricted  Anxious  Elated  Angry    Labile  Depressed  Other         Cognitive:    X Alert  Oriented PPTP     Concentration   X G  F  P   Attention Span   X G  F  P   Short-Term Memory   X G  F  P   Long-Term Memory  G  F  P   ProblemSolving/  Decision Making  G  F  P   Ability to Process  Information   X G  F  P      Contributing Factors             Delusional             Hallucinating             Flight of Ideas             Other:       Modes of Intervention:    X Education   X Support  Exploration    Clarifying  Problem Solving  Confrontation    Socialization  Limit Setting  Reality Testing    Activity  Movement  Media    Other:            Response to Learning:    X Able to verbalize current knowledge/experience    Able to verbalize/acknowledge new learning    Able to retain information    Capable of insight    Able to change behavior    Progressing to goal    Other:        Comments:     Sandrine Leblanc,PharmD, 6/26/2019, 4:19 PM

## 2019-06-27 VITALS
BODY MASS INDEX: 29.16 KG/M2 | OXYGEN SATURATION: 99 % | WEIGHT: 220 LBS | RESPIRATION RATE: 14 BRPM | DIASTOLIC BLOOD PRESSURE: 76 MMHG | TEMPERATURE: 98 F | SYSTOLIC BLOOD PRESSURE: 126 MMHG | HEIGHT: 73 IN | HEART RATE: 80 BPM

## 2019-06-27 PROCEDURE — 6370000000 HC RX 637 (ALT 250 FOR IP): Performed by: FAMILY MEDICINE

## 2019-06-27 PROCEDURE — 6370000000 HC RX 637 (ALT 250 FOR IP): Performed by: PSYCHIATRY & NEUROLOGY

## 2019-06-27 PROCEDURE — 5130000000 HC BRIDGE APPOINTMENT: Performed by: COUNSELOR

## 2019-06-27 RX ORDER — GABAPENTIN 600 MG/1
600 TABLET ORAL 3 TIMES DAILY
Qty: 42 TABLET | Refills: 0 | Status: SHIPPED | OUTPATIENT
Start: 2019-06-27 | End: 2019-09-18

## 2019-06-27 RX ORDER — CLONIDINE HYDROCHLORIDE 0.1 MG/1
0.1 TABLET ORAL NIGHTLY PRN
Qty: 14 TABLET | Refills: 0 | Status: SHIPPED | OUTPATIENT
Start: 2019-06-27 | End: 2019-07-25

## 2019-06-27 RX ORDER — TRAZODONE HYDROCHLORIDE 50 MG/1
50 TABLET ORAL NIGHTLY PRN
Qty: 14 TABLET | Refills: 0 | Status: SHIPPED | OUTPATIENT
Start: 2019-06-27 | End: 2020-07-28 | Stop reason: SDUPTHER

## 2019-06-27 RX ORDER — BUPROPION HYDROCHLORIDE 150 MG/1
150 TABLET ORAL DAILY
Qty: 14 TABLET | Refills: 0 | Status: SHIPPED | OUTPATIENT
Start: 2019-06-28 | End: 2019-07-15 | Stop reason: SDUPTHER

## 2019-06-27 RX ADMIN — GABAPENTIN 600 MG: 600 TABLET, FILM COATED ORAL at 08:26

## 2019-06-27 RX ADMIN — BUPROPION HYDROCHLORIDE 150 MG: 150 TABLET, FILM COATED, EXTENDED RELEASE ORAL at 08:26

## 2019-06-27 RX ADMIN — GABAPENTIN 600 MG: 600 TABLET, FILM COATED ORAL at 15:18

## 2019-06-27 RX ADMIN — METOPROLOL TARTRATE 50 MG: 50 TABLET ORAL at 08:26

## 2019-06-27 NOTE — PROGRESS NOTES
Patient stated he doesn't smoke. Nurse continue to reinforce the dangers of long term tobacco use and why tobacco cessation is important to patient.

## 2019-06-27 NOTE — GROUP NOTE
Group Therapy Note    Date: June 27    Group Start Time: 1430  Group End Time: 5922  Group Topic: Recreational    [facility]    Alesha Pappas, ARIANAS    Group Therapy Note    Attendees: 10    Patient's Goal:  Pt will demonstrate increased interpersonal skills. Notes:  Pt attended group and participated appropriately. Status After Intervention:  Improved    Participation Level:  Active Listener and Interactive    Participation Quality: Appropriate, Attentive, Sharing and Supportive      Speech:  normal      Thought Process/Content: Logical  Linear      Affective Functioning: Congruent      Mood: euthymic      Level of consciousness:  Alert, Oriented x4 and Attentive      Response to Learning: Able to verbalize current knowledge/experience, Able to verbalize/acknowledge new learning, Able to retain information, Capable of insight, Able to change behavior and Progressing to goal      Endings: None Reported    Modes of Intervention: Education, Support, Socialization, Exploration, Activity and Media      Discipline Responsible: Psychoeducational Specialist      Signature:  Wilmer Briggs

## 2019-06-27 NOTE — GROUP NOTE
Group Therapy Note    Date: June 27    Group Start Time: 1000  Group End Time: 2700  Group Topic: Psychoeducation    [facility] Giraldo Berna [place], [place]     Patient's Goal: To demonstrate increased interpersonal interaction. Notes: PT attended and participated in group. Status After Intervention:  Improved    Participation Level:  Active Listener and Interactive    Participation Quality: Appropriate, Attentive and Sharing      Speech:  normal      Thought Process/Content: Logical      Affective Functioning: Congruent      Mood: euthymic      Level of consciousness:  Alert and Attentive      Response to Learning: Progressing to goal      Endings: None Reported    Modes of Intervention: Support, Socialization, Problem-solving, Activity and Reality-testing      Discipline Responsible: Psychoeducational Specialist      Signature:  Fadi Garcia

## 2019-06-27 NOTE — PROGRESS NOTES
CLINICAL PHARMACY NOTE: MEDS TO [address] [place] [address] Drive Select Patient?: No  Total # of Prescriptions Filled: 5   The following medications were delivered to the patient:  · Gabapentin  · Bupropion  · Latuda  · Clonidine  · Trazodone  ·   Total # of Interventions Completed: 0  Time Spent (min): 30    Additional Documentation:

## 2019-06-27 NOTE — DISCHARGE SUMMARY
DISCHARGE SUMMARY  Patient ID:  Terri Red  [id number]  40 y.o.  1985    Admit date: 6/24/2019    Discharge date and time: 6/27/2019  11:10 AM     Admitting Physician: Cecy Jaffe MD     Discharge Physician:  HOWIE Ny - CNS    Admission Diagnoses: Severe major depression ([facility] [F32.2]    Discharge Diagnoses:   Severe major depression [facility])     Patient Active Problem List   Diagnosis Code    Chronic left-sided low back pain without sciatica M54.5, G89.29    Migraine without status migrainosus, not intractable G43.909    Severe episode of recurrent major depressive disorder, without psychotic features ([facility] F33.2    Overweight (BMI 25.0-29. 9) E66.3    Serotonin syndrome G25.79    Class 1 obesity due to excess calories without serious comorbidity with body mass index (BMI) of 30.0 to 30.9 in adult E66.09, Z68.30    SVT (supraventricular tachycardia) ([facility]) I47.1    Severe major depression ([facility] F32.2        Admission Condition: poor    Discharged Condition: stable    Indication for Admission: threat to self    History of Present Illnes (present tense wording indicates findings from admission exam on 6/24/2019 and are not necessarily indicative of current findings):   Terri Red is a 34 y.o. male who was admitted from internal medicine unit. He apparently overdosed on his medications in an attempt to commit suicide. They have treated him for rhabdomyolysis and his condition is stabilized. Because of his depression and suicidal thoughts he is admitted to psychiatric unit. He complains of lack of energy and motivation. He feels depressed and sad. He feels hopeless and useless. He complains of lack of energy and motivation. He has suicidal thoughts. Hospital Course:   Upon admission, Terri Red was provided a safe secure environment, introduced to unit milieu. Patient participated in groups and individual therapies. Meds were adjusted.   After few days of hospital care, patient began to feel improvement. Depression lifted, thoughts to harm self ceased. Sleep improved, appetite was good. On morning rounds 6/27/2019, patient endorsed feeling ready for discharge. Patient denies suicidal or homicidal ideations, denies hallucinations or delusions. Denies SE's from meds. It was decided that pt had achieved maximum benefit from hospital care and can be discharged     Consults:   None    Significant Diagnostic Studies: Routine labs and diagnostics    Treatments: Psychotropic medications, therapy with group, milieu, and 1:1 with nurses, social workers, PAEduinC/CNP, and Attending physician. Discharge Medications:  Current Discharge Medication List      START taking these medications    Details   buPROPion (WELLBUTRIN XL) 150 MG extended release tablet Take 1 tablet by mouth daily  Qty: 14 tablet, Refills: 0      traZODone (DESYREL) 50 MG tablet Take 1 tablet by mouth nightly as needed for Sleep  Qty: 14 tablet, Refills: 0         CONTINUE these medications which have CHANGED    Details   cloNIDine (CATAPRES) 0.1 MG tablet Take 1 tablet by mouth nightly as needed (sleep)  Qty: 14 tablet, Refills: 0    Comments: Meds to beds      gabapentin (NEURONTIN) 600 MG tablet Take 1 tablet by mouth 3 times daily for 14 days.   Qty: 42 tablet, Refills: 0    Associated Diagnoses: Chronic left-sided low back pain without sciatica      lurasidone (LATUDA) 20 MG TABS tablet Take 1 tablet by mouth Daily with supper  Qty: 14 tablet, Refills: 0         CONTINUE these medications which have NOT CHANGED    Details   metoprolol tartrate (LOPRESSOR) 50 MG tablet Take 1 tablet by mouth 2 times daily  Qty: 60 tablet, Refills: 3      nicotine (NICODERM CQ) 21 MG/24HR Place 1 patch onto the skin daily  Qty: 30 patch, Refills: 3      SUMAtriptan (IMITREX) 100 MG tablet Take 1 tablet by mouth once as needed for Migraine  Qty: 9 tablet, Refills: 5    Associated Diagnoses: Migraine without status migrainosus, not intractable, unspecified migraine type              Discharge Exam:  Level of consciousness:  Within normal limits  Appearance: Street clothes, seated, with good grooming  Behavior/Motor: No abnormalities noted  Attitude toward examiner:  Cooperative, attentive, good eye contact  Speech:  spontaneous, normal rate, normal volume and well articulated  Mood:  euthymic  Affect:  mood congruent  Thought processes:  linear, goal directed and coherent  Thought content:  Homocidal ideation denies  Suicidal Ideation:  denies suicidal ideation  Delusions:  no evidence of delusions  Perceptual Disturbance:  denies any perceptual disturbance  Cognition:  In tact  Memory: age appropriate  Insight & Judgement: fair  Medication side effects:  denies     Disposition: home    Patient Instructions: Activity: activity as tolerated    Follow-up as scheduled with [facility]     Time Spent: 35 minutes    Engagement: Patient displayed a good level of engagement with the treatments offered during this admission. Discharge planning, findings, and recommendations were discussed with the patient and the treatment team.    Signed:  Hugo Rm   6/27/2019  11:10 AM    Dragon voice recognition software used in portions of this document.

## 2019-06-27 NOTE — PLAN OF CARE
Problem: Altered Mood, Depressive Behavior:  Goal: Able to verbalize and/or display a decrease in depressive symptoms  Description  Able to verbalize and/or display a decrease in depressive symptoms  6/26/2019 2156 by Alex Gutierrez RN  Outcome: Met This Shift  Note:   Patient denies suicidal/homicidal ideations, denies hallucinations, showered/ attended to ADLs independently, cooperative with all care, in dayroom reading and watching television throughout shift, cooperative with all evening care, accepting of 1:1 time, appropriate interactions with both staff and peers, medication compliant and behaviorally controlled.

## 2019-06-27 NOTE — PLAN OF CARE
Patient pleasant on approach,denied any thoughts of self harm. Patient remains safe while on the unit. Patient  encouraged to continue with his medication regime. 15 MIN safety checks.

## 2019-06-27 NOTE — GROUP NOTE
Group Therapy Note    Date: June 27    Group Start Time: 1330  Group End Time: 2196  Group Topic: Cognitive Skills    [facility] [place], [place]     Patient's Goal: To demonstrate increased interpersonal interaction. Notes: PT attended and participated in group. Status After Intervention:  Improved    Participation Level:  Active Listener and Interactive    Participation Quality: Appropriate, Attentive and Sharing      Speech:  normal      Thought Process/Content: Logical      Affective Functioning: Congruent      Mood: euthymic      Level of consciousness:  Alert and Attentive      Response to Learning: Progressing to goal      Endings: None Reported    Modes of Intervention: Support, Socialization, Problem-solving, Media and Reality-testing      Discipline Responsible: Psychoeducational Specialist      Signature:  Teddy Bates

## 2019-06-27 NOTE — CARE COORDINATION
Bridge Appointment completed: Reviewed Discharge Instructions with patient. Patient verbalizes understanding and agreement with the discharge plan using the teachback method.        Discharge Arrangements: to home and follow up with Marisa Welch notified: n/a     Discharge destination/address:    [place] [address] [address]   [place] [address]       Transported by:  [facility]

## 2019-06-28 NOTE — CARE COORDINATION
Name: Klarissa Hawkins    : 1985    Discharge Date: 19    Primary Auth/Cert #: [id number]    Pt was discharged to private residence. Discharge Medications:      Medication List      START taking these medications    buPROPion 150 MG extended release tablet  Commonly known as:  WELLBUTRIN XL  Take 1 tablet by mouth daily  Notes to patient:  Help with depression     traZODone 50 MG tablet  Commonly known as:  DESYREL  Take 1 tablet by mouth nightly as needed for Sleep  Notes to patient:  Help with sleep. CHANGE how you take these medications    lurasidone 20 MG Tabs tablet  Commonly known as:  LATUDA  Take 1 tablet by mouth Daily with supper  What changed:  when to take this  Notes to patient:  Help with depression. CONTINUE taking these medications    cloNIDine 0.1 MG tablet  Commonly known as:  CATAPRES  Take 1 tablet by mouth nightly as needed (sleep)  Notes to patient:  Help with sleep.     gabapentin 600 MG tablet  Commonly known as:  NEURONTIN  Take 1 tablet by mouth 3 times daily for 14 days. Notes to patient:  Help with pain. metoprolol tartrate 50 MG tablet  Commonly known as:  LOPRESSOR  Take 1 tablet by mouth 2 times daily  Notes to patient:  Help with blood pressure. nicotine 21 MG/24HR  Commonly known as:  [address] [facility] 1 patch onto the skin daily  Notes to patient:  Smoking cession. SUMAtriptan 100 MG tablet  Commonly known as:  IMITREX  Take 1 tablet by mouth once as needed for Migraine  Notes to patient:  Help with migraines           Where to Get Your Medications      These medications were sent to [facility], [address], [address] [facility] [address]    Phone:  [phone]   · buPROPion 150 MG extended release tablet  · cloNIDine 0.1 MG tablet  · gabapentin 600 MG tablet  · lurasidone 20 MG Tabs tablet  · traZODone 50 MG tablet      Pharmacy Instructions:       [facility] [address] [facility] To- Bed.                   Follow Up Appointment: Jerrald Schilder, MD  [place] [address]  [address] [facility] [address] [phone]      As needed follow up     [address] [place] [address]  [phone]  fax [phone]  On 7/10/2019  @ 8 am with Dr Kerrie Ball for psych eval and medications     [facility]   [address], [facility] [address]  Phone: [phone]    Call therapist to schedule with her for therapy appointment

## 2019-07-23 ENCOUNTER — OFFICE VISIT (OUTPATIENT)
Dept: ORTHOPEDIC SURGERY | Age: 34
End: 2019-07-23
Payer: OTHER GOVERNMENT

## 2019-07-23 VITALS — HEIGHT: 73 IN | BODY MASS INDEX: 31.81 KG/M2 | WEIGHT: 240 LBS

## 2019-07-23 DIAGNOSIS — M25.562 CHRONIC PATELLOFEMORAL PAIN OF BOTH KNEES: Primary | ICD-10-CM

## 2019-07-23 DIAGNOSIS — M25.561 CHRONIC PATELLOFEMORAL PAIN OF BOTH KNEES: Primary | ICD-10-CM

## 2019-07-23 DIAGNOSIS — G89.29 CHRONIC PATELLOFEMORAL PAIN OF BOTH KNEES: Primary | ICD-10-CM

## 2019-07-23 PROCEDURE — 99212 OFFICE O/P EST SF 10 MIN: CPT | Performed by: ORTHOPAEDIC SURGERY

## 2019-07-23 NOTE — PROGRESS NOTES
HPI: Mr. Johnson Shoulder returns today for evaluation of both of his knees. Following our last visit he states that he had some medical issues and was unable to undergo physical therapy. He returns today wanting to start physical therapy. His right knee hurts more than his left. His exam today remains unchanged. We had discussions today but it is really not clear what is causing his pain but it appears to be patellofemoral in nature. A new prescription for physical therapy was provided and he was also given a prescription for a patellar stabilizing brace for his right knee. I will have him follow-up in 3 months for reevaluation. If he has persistent symptoms at that time we discussed the possibility of him getting a second opinion.

## 2020-03-13 ENCOUNTER — HOSPITAL ENCOUNTER (OUTPATIENT)
Dept: MRI IMAGING | Facility: CLINIC | Age: 35
Discharge: HOME | End: 2020-03-15
Payer: COMMERCIAL

## 2020-03-13 PROCEDURE — 72148 MRI LUMBAR SPINE W/O DYE: CPT

## 2020-06-18 ENCOUNTER — HOSPITAL ENCOUNTER (OUTPATIENT)
Dept: ULTRASOUND IMAGING | Age: 35
Discharge: HOME | End: 2020-06-20
Payer: COMMERCIAL

## 2020-06-18 PROCEDURE — 76870 US EXAM SCROTUM: CPT

## 2020-08-17 ENCOUNTER — OFFICE VISIT (OUTPATIENT)
Dept: DERMATOLOGY | Age: 35
End: 2020-08-17
Payer: COMMERCIAL

## 2020-08-17 VITALS
OXYGEN SATURATION: 98 % | BODY MASS INDEX: 31.49 KG/M2 | HEART RATE: 94 BPM | WEIGHT: 237.6 LBS | SYSTOLIC BLOOD PRESSURE: 139 MMHG | DIASTOLIC BLOOD PRESSURE: 88 MMHG | TEMPERATURE: 98.1 F | HEIGHT: 73 IN

## 2020-08-17 PROCEDURE — 99202 OFFICE O/P NEW SF 15 MIN: CPT | Performed by: DERMATOLOGY

## 2020-08-17 PROCEDURE — G8417 CALC BMI ABV UP PARAM F/U: HCPCS | Performed by: DERMATOLOGY

## 2020-08-17 PROCEDURE — 1036F TOBACCO NON-USER: CPT | Performed by: DERMATOLOGY

## 2020-08-17 PROCEDURE — G8427 DOCREV CUR MEDS BY ELIG CLIN: HCPCS | Performed by: DERMATOLOGY

## 2020-08-17 RX ORDER — KETOCONAZOLE 20 MG/ML
SHAMPOO TOPICAL
Qty: 120 ML | Refills: 3 | Status: SHIPPED | OUTPATIENT
Start: 2020-08-17 | End: 2021-10-08

## 2020-08-17 RX ORDER — FLUCONAZOLE 200 MG/1
TABLET ORAL
Qty: 2 TABLET | Refills: 0 | Status: SHIPPED | OUTPATIENT
Start: 2020-08-17 | End: 2020-12-14 | Stop reason: ALTCHOICE

## 2020-08-17 NOTE — PATIENT INSTRUCTIONS
- Apply ketoconazole shampoo as a body wash daily for 2 weeks, then just 2-3 days weekly as maintenance. Leave on skin for 5 minutes prior to rinsing out  - Diflucan; take 1 pill, work up a sweat and wait 8 hours to shower.  Repeat in 1 week  - Follow up as needed

## 2020-08-17 NOTE — PROGRESS NOTES
Substance Use Topics    Alcohol use: Yes     Alcohol/week: 14.0 standard drinks     Types: 14 Cans of beer per week     Comment: 2 to 3 beers/day. REVIEW OF SYSTEMS:  Review of Systems  Skin: Denies any new changing, growing orbleeding lesions or rashes except as described in the HPI   Constitutional: Denies fevers, chills, and malaise. PHYSICAL EXAM:   /88 (Site: Left Upper Arm, Position: Sitting, Cuff Size: Large Adult)   Pulse 94   Temp 98.1 °F (36.7 °C)   Ht 6' 1\" (1.854 m)   Wt 237 lb 9.6 oz (107.8 kg)   SpO2 98%   BMI 31.35 kg/m²     General Exam:  General Appearance: No acute distress, Well nourished     Neuro: Alert and oriented to person, place and time  Psych: Normal affect   Lymph Node: Not performed    Cutaneous Exam: Performed as documented in clinic note below. Waist-up skin, which includes the head/face,neck, both arms, chest, back, abdomen, digits and/or nails, was examined. Pertinent Physical Exam Findings:  Physical Exam  Hypopigmented/pink macules and patches with fine scale over back    Photo surveillance performed: No    Medical Necessity of Exam Performed:   Distribution of patient concerns    Additional Diagnostic Testing performed during exam: RADHA ,  Positive    ASSESSMENT:   Diagnosis Orders   1. Tinea versicolor  ketoconazole (NIZORAL) 2 % shampoo    fluconazole (DIFLUCAN) 200 MG tablet       Plan of Action is as Follows:  Assessment   1. Tinea versicolor  - discussed diagnosis, etiology, natural course, and treatment options  - ketoconazole (NIZORAL) 2 % shampoo; Use as body wash leaving on for 5 minutes prior to washing off once daily for 2 weeks then three times weekly  Dispense: 120 mL; Refill: 3  - fluconazole (DIFLUCAN) 200 MG tablet; Take 200 mg once, exercise for 1 hour and don't shower for at least 8 hours. Repeat in 1 week  Dispense: 2 tablet;  Refill: 0    RTC prn            Patient Instructions   - Apply ketoconazole shampoo as a body wash daily for 2

## 2020-08-18 ENCOUNTER — HOSPITAL ENCOUNTER (OUTPATIENT)
Dept: MRI IMAGING | Facility: CLINIC | Age: 35
Discharge: HOME | End: 2020-08-20
Payer: COMMERCIAL

## 2020-08-18 PROCEDURE — 72141 MRI NECK SPINE W/O DYE: CPT

## 2020-09-02 ENCOUNTER — HOSPITAL ENCOUNTER (EMERGENCY)
Age: 35
Discharge: HOME HEALTH | DRG: 351 | End: 2020-09-02
Attending: EMERGENCY MEDICINE
Payer: COMMERCIAL

## 2020-09-02 VITALS
HEART RATE: 87 BPM | DIASTOLIC BLOOD PRESSURE: 87 MMHG | TEMPERATURE: 98.1 F | SYSTOLIC BLOOD PRESSURE: 145 MMHG | WEIGHT: 235 LBS | RESPIRATION RATE: 16 BRPM | BODY MASS INDEX: 31.14 KG/M2 | HEIGHT: 73 IN | OXYGEN SATURATION: 97 %

## 2020-09-02 LAB
-: ABNORMAL
ABSOLUTE EOS #: 0.1 K/UL (ref 0–0.4)
ABSOLUTE IMMATURE GRANULOCYTE: ABNORMAL K/UL (ref 0–0.3)
ABSOLUTE LYMPH #: 2.2 K/UL (ref 1–4.8)
ABSOLUTE MONO #: 0.6 K/UL (ref 0.1–1.3)
AMORPHOUS: ABNORMAL
ANION GAP SERPL CALCULATED.3IONS-SCNC: 13 MMOL/L (ref 9–17)
BACTERIA: ABNORMAL
BASOPHILS # BLD: 0 % (ref 0–2)
BASOPHILS ABSOLUTE: 0 K/UL (ref 0–0.2)
BILIRUBIN URINE: ABNORMAL
BUN BLDV-MCNC: 15 MG/DL (ref 6–20)
BUN/CREAT BLD: ABNORMAL (ref 9–20)
CALCIUM SERPL-MCNC: 9 MG/DL (ref 8.6–10.4)
CASTS UA: ABNORMAL /LPF
CASTS UA: ABNORMAL /LPF
CHLORIDE BLD-SCNC: 103 MMOL/L (ref 98–107)
CO2: 22 MMOL/L (ref 20–31)
COLOR: ABNORMAL
COMMENT UA: ABNORMAL
CREAT SERPL-MCNC: 0.65 MG/DL (ref 0.7–1.2)
CRYSTALS, UA: ABNORMAL /HPF
DIFFERENTIAL TYPE: ABNORMAL
EOSINOPHILS RELATIVE PERCENT: 2 % (ref 0–4)
EPITHELIAL CELLS UA: ABNORMAL /HPF
GFR AFRICAN AMERICAN: >60 ML/MIN
GFR NON-AFRICAN AMERICAN: >60 ML/MIN
GFR SERPL CREATININE-BSD FRML MDRD: ABNORMAL ML/MIN/{1.73_M2}
GFR SERPL CREATININE-BSD FRML MDRD: ABNORMAL ML/MIN/{1.73_M2}
GLUCOSE BLD-MCNC: 114 MG/DL (ref 70–99)
GLUCOSE URINE: NEGATIVE
HCT VFR BLD CALC: 47 % (ref 41–53)
HEMOGLOBIN: 16 G/DL (ref 13.5–17.5)
IMMATURE GRANULOCYTES: ABNORMAL %
KETONES, URINE: ABNORMAL
LEUKOCYTE ESTERASE, URINE: ABNORMAL
LYMPHOCYTES # BLD: 32 % (ref 24–44)
MCH RBC QN AUTO: 28.8 PG (ref 26–34)
MCHC RBC AUTO-ENTMCNC: 34 G/DL (ref 31–37)
MCV RBC AUTO: 84.9 FL (ref 80–100)
MONOCYTES # BLD: 9 % (ref 1–7)
MUCUS: ABNORMAL
NITRITE, URINE: NEGATIVE
NRBC AUTOMATED: ABNORMAL PER 100 WBC
OTHER OBSERVATIONS UA: ABNORMAL
PDW BLD-RTO: 13.3 % (ref 11.5–14.9)
PH UA: 6.5 (ref 5–8)
PLATELET # BLD: 269 K/UL (ref 150–450)
PLATELET ESTIMATE: ABNORMAL
PMV BLD AUTO: 8.8 FL (ref 6–12)
POTASSIUM SERPL-SCNC: 4 MMOL/L (ref 3.7–5.3)
PROTEIN UA: ABNORMAL
RBC # BLD: 5.54 M/UL (ref 4.5–5.9)
RBC # BLD: ABNORMAL 10*6/UL
RBC UA: ABNORMAL /HPF
RENAL EPITHELIAL, UA: ABNORMAL /HPF
SEG NEUTROPHILS: 57 % (ref 36–66)
SEGMENTED NEUTROPHILS ABSOLUTE COUNT: 4 K/UL (ref 1.3–9.1)
SODIUM BLD-SCNC: 138 MMOL/L (ref 135–144)
SPECIFIC GRAVITY UA: 1.03 (ref 1–1.03)
TOTAL CK: ABNORMAL U/L (ref 39–308)
TRICHOMONAS: ABNORMAL
TURBIDITY: ABNORMAL
URINE HGB: ABNORMAL
UROBILINOGEN, URINE: NORMAL
WBC # BLD: 7 K/UL (ref 3.5–11)
WBC # BLD: ABNORMAL 10*3/UL
WBC UA: ABNORMAL /HPF
YEAST: ABNORMAL

## 2020-09-02 PROCEDURE — 84100 ASSAY OF PHOSPHORUS: CPT

## 2020-09-02 PROCEDURE — 85025 COMPLETE CBC W/AUTO DIFF WBC: CPT

## 2020-09-02 PROCEDURE — 2580000003 HC RX 258: Performed by: EMERGENCY MEDICINE

## 2020-09-02 PROCEDURE — 80307 DRUG TEST PRSMV CHEM ANLYZR: CPT

## 2020-09-02 PROCEDURE — 36415 COLL VENOUS BLD VENIPUNCTURE: CPT

## 2020-09-02 PROCEDURE — 81001 URINALYSIS AUTO W/SCOPE: CPT

## 2020-09-02 PROCEDURE — 82550 ASSAY OF CK (CPK): CPT

## 2020-09-02 PROCEDURE — 80048 BASIC METABOLIC PNL TOTAL CA: CPT

## 2020-09-02 PROCEDURE — 83735 ASSAY OF MAGNESIUM: CPT

## 2020-09-02 PROCEDURE — 99283 EMERGENCY DEPT VISIT LOW MDM: CPT

## 2020-09-02 RX ORDER — SODIUM CHLORIDE 9 MG/ML
INJECTION, SOLUTION INTRAVENOUS CONTINUOUS
Status: DISCONTINUED | OUTPATIENT
Start: 2020-09-02 | End: 2020-09-03 | Stop reason: HOSPADM

## 2020-09-02 RX ORDER — 0.9 % SODIUM CHLORIDE 0.9 %
2000 INTRAVENOUS SOLUTION INTRAVENOUS ONCE
Status: COMPLETED | OUTPATIENT
Start: 2020-09-02 | End: 2020-09-02

## 2020-09-02 RX ADMIN — SODIUM CHLORIDE 2000 ML: 9 INJECTION, SOLUTION INTRAVENOUS at 22:02

## 2020-09-02 ASSESSMENT — PAIN SCALES - GENERAL: PAINLEVEL_OUTOF10: 10

## 2020-09-02 NOTE — LETTER
[facility]  [address] [facility] [address]  Phone: [phone]    Patient: Farhan Bergeron  YOB: 1985  Date: 9/2/2020 Time: 11:10 PM    Leaving the Hospital Against Medical Advice    Chart #:[id number]    This will certify that I, the undersigned,    ______________________________________________________________________    A patient in the above named medical center, having requested discharge and removal from the medical center against the advice of my attending physician(s), hereby release the Emergency Department, its physicians, officers and employees, severally and individually, from any and all liability of any nature whatsoever for any injury or harm or complication of any kind that may result directly or indirectly, by reason of my terminating my stay as a patient from [facility], and hereby waive any and all rights of action I may now have or later acquire as a result of my voluntary departure from [facility] and the termination of my stay as a patient therein. This release is made with the full knowledge of the danger that may result from the action which I am taking.       Date:_______________________                         ___________________________                                                                                    Patient/Legal Representative    Witness:        ____________________________                          ___________________________  Nurse                                                                        Physician

## 2020-09-03 ENCOUNTER — HOSPITAL ENCOUNTER (INPATIENT)
Age: 35
LOS: 5 days | Discharge: HOME | DRG: 351 | End: 2020-09-08
Attending: EMERGENCY MEDICINE | Admitting: FAMILY MEDICINE
Payer: COMMERCIAL

## 2020-09-03 PROBLEM — M62.82 RHABDOMYOLYSIS: Status: ACTIVE | Noted: 2020-09-03

## 2020-09-03 PROBLEM — G90.81 SEROTONIN SYNDROME: Status: RESOLVED | Noted: 2019-06-19 | Resolved: 2020-09-03

## 2020-09-03 PROBLEM — G25.79 SEROTONIN SYNDROME: Status: RESOLVED | Noted: 2019-06-19 | Resolved: 2020-09-03

## 2020-09-03 LAB
ABSOLUTE EOS #: 0.2 K/UL (ref 0–0.4)
ABSOLUTE IMMATURE GRANULOCYTE: ABNORMAL K/UL (ref 0–0.3)
ABSOLUTE LYMPH #: 2.3 K/UL (ref 1–4.8)
ABSOLUTE MONO #: 0.6 K/UL (ref 0.1–1.3)
AMPHETAMINE SCREEN URINE: NEGATIVE
ANION GAP SERPL CALCULATED.3IONS-SCNC: 10 MMOL/L (ref 9–17)
BARBITURATE SCREEN URINE: NEGATIVE
BASOPHILS # BLD: 0 % (ref 0–2)
BASOPHILS ABSOLUTE: 0 K/UL (ref 0–0.2)
BENZODIAZEPINE SCREEN, URINE: NEGATIVE
BUN BLDV-MCNC: 13 MG/DL (ref 6–20)
BUN/CREAT BLD: ABNORMAL (ref 9–20)
BUPRENORPHINE URINE: NORMAL
CALCIUM SERPL-MCNC: 8.2 MG/DL (ref 8.6–10.4)
CANNABINOID SCREEN URINE: NEGATIVE
CHLORIDE BLD-SCNC: 108 MMOL/L (ref 98–107)
CO2: 23 MMOL/L (ref 20–31)
COCAINE METABOLITE, URINE: NEGATIVE
CREAT SERPL-MCNC: 0.65 MG/DL (ref 0.7–1.2)
DIFFERENTIAL TYPE: ABNORMAL
EOSINOPHILS RELATIVE PERCENT: 4 % (ref 0–4)
GFR AFRICAN AMERICAN: >60 ML/MIN
GFR NON-AFRICAN AMERICAN: >60 ML/MIN
GFR SERPL CREATININE-BSD FRML MDRD: ABNORMAL ML/MIN/{1.73_M2}
GFR SERPL CREATININE-BSD FRML MDRD: ABNORMAL ML/MIN/{1.73_M2}
GLUCOSE BLD-MCNC: 135 MG/DL (ref 70–99)
HCT VFR BLD CALC: 41.1 % (ref 41–53)
HEMOGLOBIN: 14.1 G/DL (ref 13.5–17.5)
IMMATURE GRANULOCYTES: ABNORMAL %
LYMPHOCYTES # BLD: 35 % (ref 24–44)
MAGNESIUM: 2 MG/DL (ref 1.6–2.6)
MCH RBC QN AUTO: 28.9 PG (ref 26–34)
MCHC RBC AUTO-ENTMCNC: 34.3 G/DL (ref 31–37)
MCV RBC AUTO: 84.4 FL (ref 80–100)
MDMA URINE: NORMAL
METHADONE SCREEN, URINE: NEGATIVE
METHAMPHETAMINE, URINE: NORMAL
MONOCYTES # BLD: 9 % (ref 1–7)
NRBC AUTOMATED: ABNORMAL PER 100 WBC
OPIATES, URINE: NEGATIVE
OXYCODONE SCREEN URINE: NEGATIVE
PDW BLD-RTO: 13.1 % (ref 11.5–14.9)
PHENCYCLIDINE, URINE: NEGATIVE
PHOSPHORUS: 3.6 MG/DL (ref 2.5–4.5)
PLATELET # BLD: 232 K/UL (ref 150–450)
PLATELET ESTIMATE: ABNORMAL
PMV BLD AUTO: 9.3 FL (ref 6–12)
POTASSIUM SERPL-SCNC: 3.8 MMOL/L (ref 3.7–5.3)
PROPOXYPHENE, URINE: NORMAL
RBC # BLD: 4.87 M/UL (ref 4.5–5.9)
RBC # BLD: ABNORMAL 10*6/UL
SEG NEUTROPHILS: 52 % (ref 36–66)
SEGMENTED NEUTROPHILS ABSOLUTE COUNT: 3.5 K/UL (ref 1.3–9.1)
SODIUM BLD-SCNC: 141 MMOL/L (ref 135–144)
TEST INFORMATION: NORMAL
TOTAL CK: ABNORMAL U/L (ref 39–308)
TRICYCLIC ANTIDEPRESSANTS, UR: NORMAL
WBC # BLD: 6.7 K/UL (ref 3.5–11)
WBC # BLD: ABNORMAL 10*3/UL

## 2020-09-03 PROCEDURE — 85025 COMPLETE CBC W/AUTO DIFF WBC: CPT

## 2020-09-03 PROCEDURE — 36415 COLL VENOUS BLD VENIPUNCTURE: CPT

## 2020-09-03 PROCEDURE — 6360000002 HC RX W HCPCS: Performed by: FAMILY MEDICINE

## 2020-09-03 PROCEDURE — 99284 EMERGENCY DEPT VISIT MOD MDM: CPT

## 2020-09-03 PROCEDURE — 2580000003 HC RX 258: Performed by: EMERGENCY MEDICINE

## 2020-09-03 PROCEDURE — 2060000000 HC ICU INTERMEDIATE R&B

## 2020-09-03 PROCEDURE — 6370000000 HC RX 637 (ALT 250 FOR IP): Performed by: FAMILY MEDICINE

## 2020-09-03 PROCEDURE — 82550 ASSAY OF CK (CPK): CPT

## 2020-09-03 PROCEDURE — 80048 BASIC METABOLIC PNL TOTAL CA: CPT

## 2020-09-03 RX ORDER — KETOROLAC TROMETHAMINE 30 MG/ML
15 INJECTION, SOLUTION INTRAMUSCULAR; INTRAVENOUS EVERY 6 HOURS PRN
Status: DISCONTINUED | OUTPATIENT
Start: 2020-09-03 | End: 2020-09-05

## 2020-09-03 RX ORDER — TRAZODONE HYDROCHLORIDE 50 MG/1
50 TABLET ORAL NIGHTLY PRN
Status: DISCONTINUED | OUTPATIENT
Start: 2020-09-03 | End: 2020-09-08 | Stop reason: HOSPADM

## 2020-09-03 RX ORDER — SODIUM CHLORIDE 0.9 % (FLUSH) 0.9 %
10 SYRINGE (ML) INJECTION EVERY 12 HOURS SCHEDULED
Status: DISCONTINUED | OUTPATIENT
Start: 2020-09-03 | End: 2020-09-08 | Stop reason: HOSPADM

## 2020-09-03 RX ORDER — PREGABALIN 100 MG/1
100 CAPSULE ORAL 2 TIMES DAILY
Status: DISCONTINUED | OUTPATIENT
Start: 2020-09-03 | End: 2020-09-08 | Stop reason: HOSPADM

## 2020-09-03 RX ORDER — BUPROPION HYDROCHLORIDE 150 MG/1
150 TABLET ORAL DAILY
Status: DISCONTINUED | OUTPATIENT
Start: 2020-09-03 | End: 2020-09-08 | Stop reason: HOSPADM

## 2020-09-03 RX ORDER — SUMATRIPTAN 100 MG/1
100 TABLET, FILM COATED ORAL
Status: COMPLETED | OUTPATIENT
Start: 2020-09-03 | End: 2020-09-03

## 2020-09-03 RX ORDER — SODIUM CHLORIDE 0.9 % (FLUSH) 0.9 %
10 SYRINGE (ML) INJECTION PRN
Status: DISCONTINUED | OUTPATIENT
Start: 2020-09-03 | End: 2020-09-08 | Stop reason: HOSPADM

## 2020-09-03 RX ORDER — SODIUM CHLORIDE 9 MG/ML
INJECTION, SOLUTION INTRAVENOUS CONTINUOUS
Status: DISCONTINUED | OUTPATIENT
Start: 2020-09-03 | End: 2020-09-08 | Stop reason: HOSPADM

## 2020-09-03 RX ORDER — ACETAMINOPHEN 325 MG/1
650 TABLET ORAL EVERY 4 HOURS PRN
Status: DISCONTINUED | OUTPATIENT
Start: 2020-09-03 | End: 2020-09-08 | Stop reason: HOSPADM

## 2020-09-03 RX ORDER — 0.9 % SODIUM CHLORIDE 0.9 %
1000 INTRAVENOUS SOLUTION INTRAVENOUS ONCE
Status: COMPLETED | OUTPATIENT
Start: 2020-09-03 | End: 2020-09-03

## 2020-09-03 RX ADMIN — SODIUM CHLORIDE: 9 INJECTION, SOLUTION INTRAVENOUS at 12:28

## 2020-09-03 RX ADMIN — SODIUM CHLORIDE: 9 INJECTION, SOLUTION INTRAVENOUS at 01:18

## 2020-09-03 RX ADMIN — ACETAMINOPHEN 650 MG: 325 TABLET, FILM COATED ORAL at 12:28

## 2020-09-03 RX ADMIN — LURASIDONE HYDROCHLORIDE 40 MG: 40 TABLET, FILM COATED ORAL at 17:32

## 2020-09-03 RX ADMIN — PREGABALIN 100 MG: 100 CAPSULE ORAL at 08:42

## 2020-09-03 RX ADMIN — PREGABALIN 100 MG: 100 CAPSULE ORAL at 20:51

## 2020-09-03 RX ADMIN — SUMATRIPTAN SUCCINATE 100 MG: 100 TABLET ORAL at 20:51

## 2020-09-03 RX ADMIN — BUPROPION HYDROCHLORIDE 150 MG: 150 TABLET, EXTENDED RELEASE ORAL at 08:42

## 2020-09-03 RX ADMIN — KETOROLAC TROMETHAMINE 15 MG: 30 INJECTION, SOLUTION INTRAMUSCULAR at 14:13

## 2020-09-03 RX ADMIN — SODIUM CHLORIDE 1000 ML: 9 INJECTION, SOLUTION INTRAVENOUS at 00:20

## 2020-09-03 RX ADMIN — KETOROLAC TROMETHAMINE 15 MG: 30 INJECTION, SOLUTION INTRAMUSCULAR at 20:56

## 2020-09-03 RX ADMIN — TRAZODONE HYDROCHLORIDE 50 MG: 50 TABLET ORAL at 20:51

## 2020-09-03 ASSESSMENT — ENCOUNTER SYMPTOMS
SHORTNESS OF BREATH: 0
BACK PAIN: 0
GASTROINTESTINAL NEGATIVE: 1
DIARRHEA: 0
RESPIRATORY NEGATIVE: 1
ABDOMINAL PAIN: 0
VOMITING: 0
COUGH: 0

## 2020-09-03 ASSESSMENT — PAIN SCALES - GENERAL
PAINLEVEL_OUTOF10: 8
PAINLEVEL_OUTOF10: 8
PAINLEVEL_OUTOF10: 6
PAINLEVEL_OUTOF10: 8
PAINLEVEL_OUTOF10: 8
PAINLEVEL_OUTOF10: 10

## 2020-09-03 NOTE — PROGRESS NOTES
Pt admitted to room 2117 from ER via wheelchair. Heart monitor applied, vital signs obtained and stable at this time. Admission questions completed per pt. Pt has no c/o or s/s of distress at this time. Call light within reach, bed side table within reach and 2/4 side rails up. Will continue to monitor.
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
## 2020-09-03 NOTE — PLAN OF CARE
Problem: Safety:  Goal: Free from accidental physical injury  Description: Free from accidental physical injury  Outcome: Ongoing  Goal: Free from intentional harm  Description: Free from intentional harm  Outcome: Ongoing     Problem: Daily Care:  Goal: Daily care needs are met  Description: Daily care needs are met  Outcome: Ongoing     Problem: Pain:  Goal: Patient's pain/discomfort is manageable  Description: Patient's pain/discomfort is manageable  9/3/2020 0246 by Eugenio Preston RN  Outcome: Ongoing  9/3/2020 0244 by Eugenio Preston RN  Note: Pt c/o of generalized muscle aches/cramping. RN promoting rest and educating the need to be safe. Problem: Discharge Planning:  Goal: Patients continuum of care needs are met  Description: Patients continuum of care needs are met  Outcome: Ongoing     Problem: Fluid Volume:  Goal: Ability to achieve a balanced intake and output will improve  Description: Ability to achieve a balanced intake and output will improve  9/3/2020 0246 by Eugenio Preston RN  Outcome: Ongoing  9/3/2020 0244 by Eugenio Preston RN  Note: Pt receiving NS @ 200. Will monitor labs.      Problem: Physical Regulation:  Goal: Ability to maintain clinical measurements within normal limits will improve  Description: Ability to maintain clinical measurements within normal limits will improve  Outcome: Ongoing  Goal: Will show no signs and symptoms of electrolyte imbalance  Description: Will show no signs and symptoms of electrolyte imbalance  Outcome: Ongoing     Problem: Tissue Perfusion - Cardiopulmonary, Altered:  Goal: Absence of angina  Description: Absence of angina  Outcome: Ongoing  Goal: Hemodynamic stability will improve  Description: Hemodynamic stability will improve  Outcome: Ongoing

## 2020-09-03 NOTE — CONSULTS
NEPHROLOGY CONSULT     Patient :  Liliam Vela; 37 y.o. MRN# [id number]  Location:  2117/2117-01  Attending:  Roseanna Heart MD  Admit Date:  9/3/2020   Hospital Day: 0      Reason for Consult: Rhabdomyolysis      Chief Complaint: Severe muscle pain  History Obtained From: Patient     History of Present Illness: This is a 37 y.o. male presented to the hospital with severe complains of muscle pain in the arms and in the legs. Patient recently started working out and was training with a  and did 2 days of exercise and after that he developed severe amount of pain in his biceps and his upper legs and that is why he came to the hospital patient was also taking normal protein supplements. Patient also noticed urine color turn brown and tea colored denied any pain while urination  Initial lab work showed total CK 71,000 consistent with rhabdomyolysis  UA showed 2+ protein RBC 2-5 large hemoglobin  Kidney function remained stable and within normal limits serum creatinine 0.6 mg/dL  Pt denies any history of  prolonged NSAID use. There has been no recent exposure to IV contrast.   There is no history  of paraprotein disease. Pt denies any history of recurrent UTI or kidney stones. Medication review shows no  use of ACE-inhibitor/diuretics. Patient is on IV normal saline at 200 MLS per hour    Past Medical History:    History reviewed. No pertinent past medical history. Past Surgical History:    History reviewed. No pertinent surgical history.     Current Medications:    0.9 % sodium chloride infusion, Continuous  sodium chloride flush 0.9 % injection 10 mL, 2 times per day  sodium chloride flush 0.9 % injection 10 mL, PRN  acetaminophen (TYLENOL) tablet 650 mg, Q4H PRN  enoxaparin (LOVENOX) injection 30 mg, BID  buPROPion (WELLBUTRIN XL) extended release tablet 150 mg, Daily  lurasidone (LATUDA) tablet 40 mg, Dinner  pregabalin (LYRICA) capsule 100 mg, BID  SUMAtriptan (IMITREX) tablet 100 discharge or sore throat. Cardiac:  No chest pain, dyspnea, orthopnea or PND, palpitations  Chest:  No cough, hemoptysis, pleuritic chest pain, wheezing,SOB  Abdomen:  No abdominal pain, nausea, vomiting, diarrhea, melena, dysphagia hematemesis,constipation, abdominal bloating, flank pain  Neuro:  No CVA, TIA or seizure like activity. Skin:   No rashes, no itching. :   No hematuria, no pyuria, no dysuria, no flank pain. Extremities:  Bilateral arm pain the muscles and leg pain      Objective:  CURRENT TEMPERATURE:  Temp: 97.5 °F (36.4 °C)  MAXIMUM TEMPERATURE OVER 24HRS:  Temp (24hrs), Av.9 °F (36.6 °C), Min:97.5 °F (36.4 °C), Max:98.1 °F (36.7 °C)    CURRENT RESPIRATORY RATE:  Resp: 16  CURRENT PULSE:  Pulse: 63  CURRENT BLOOD PRESSURE:  BP: (!) 149/100  24HR BLOOD PRESSURE RANGE:  Systolic (72NLM), QVA:097 , Min:145 , PSE:060   ; Diastolic (06IBM), RVJ:09, Min:87, Max:100    24HR INTAKE/OUTPUT:      Intake/Output Summary (Last 24 hours) at 9/3/2020 1627  Last data filed at 9/3/2020 1455  Gross per 24 hour   Intake 2557 ml   Output 400 ml   Net 2157 ml     Patient Vitals for the past 96 hrs (Last 3 readings):   Weight   20 0400 249 lb 5.4 oz (113.1 kg)   20 0009 235 lb (106.6 kg)       Physical Exam:  GENERAL APPEARANCE: Alert and cooperative, and appears to be in no acute distress. HEAD: normocephalic  EYES: PERRL, EOMI. Not pale, anicteric   NOSE:  No nasal discharge. THROAT:  Oral cavity and pharynx normal. Moist  CARDIAC: Normal S1 and S2. No S3, S4 or murmurs. Rhythm is regular. LUNGS: Clear to auscultation and percussion without rales, rhonchi, wheezing or diminished breath sounds. NECK: Neck supple, non-tender without lymphadenopathy, masses or thyromegaly.  JVD-neg  BACK: Examination of the spine reveals normal gait and posture, no spinal deformity, symmetry of spinal muscles, without tenderness, decreased range of motion or muscular spasm  MUSKULOSKELETAL: Adequately aligned spine. No joint erythema or tenderness. EXTREMITIES: No edema. Peripheral pulses intact. NEURO: Nonfocal      Labs:   CBC:  Recent Labs     09/02/20 2145 09/03/20 0427   WBC 7.0 6.7   RBC 5.54 4.87   HGB 16.0 14.1   HCT 47.0 41.1   MCV 84.9 84.4   MCH 28.8 28.9   MCHC 34.0 34.3   RDW 13.3 13.1    232   MPV 8.8 9.3      BMP:   Recent Labs     09/02/20 2145 09/03/20 0427    141   K 4.0 3.8    108*   CO2 22 23   BUN 15 13   CREATININE 0.65* 0.65*   GLUCOSE 114* 135*   CALCIUM 9.0 8.2*      Phosphorus:    Recent Labs     09/02/20 2145   PHOS 3.6     Magnesium:   Recent Labs     09/02/20 2145   MG 2.0     Albumin: No results for input(s): LABALBU in the last 72 hours. IRON:  No results for input(s): IRON in the last 72 hours. Iron Saturation:  Invalid input(s): PERCENTFE  TIBC:  No results for input(s): TIBC in the last 72 hours. FERRITIN:  No results for input(s): FERRITIN in the last 72 hours. SPEP: No results for input(s): SPEP in the last 72 hours. No results for input(s): PROT, ALBCAL, ALBCAL, ALBPCT, LABALPH, A1PCT, LABALPH, A2PCT, LABBETA, BETAPCT, GAMGLOB, GGPCT, PATH in the last 72 hours. UPEP: No results for input(s): TPU in the last 72 hours. Urine Sodium:  No results for input(s): MARY in the last 72 hours. Urine Potassium: No results for input(s): KUR in the last 72 hours. Urine Chloride: Invalid input(s): CLU  Urine Ph:  Invalid input(s): PO4U  Urine Osmolarity: No results for input(s): OSMOU in the last 72 hours. Urine Creatinine:  No results for input(s): LABCREA in the last 72 hours. Urine Eosinophils: Invalid input(s): EOSU  Urine Protein:  No results for input(s): TPU in the last 72 hours.   Urinalysis:    Recent Labs     09/02/20  2207   NITRU NEGATIVE   COLORU DARK YELLOW*   PHUR 6.5   WBCUA 2 TO 5   RBCUA 2 TO 5   MUCUS 2+*   TRICHOMONAS NOT REPORTED   YEAST NOT REPORTED   BACTERIA FEW*   SPECGRAV 1.029   LEUKOCYTESUR TRACE*   UROBILINOGEN Normal BILIRUBINUR Presumptive positive. Unable to confirm due to unavailability of reagent. *   GLUCOSEU NEGATIVE   KETUA MOD*   AMORPHOUS NOT REPORTED         Radiology:  Reviewed as available. Assessment:  1. Rhabdomyolysis, due to extreme muscle exhaustion, kidney function remains stable serum creatinine within normal limits nonoliguric, still have dark urine on IV fluids normal saline at 200 MLS per hour       Plan:  1. Continue to monitor I's and O's continue IV fluids 200 MLS per hour. 2. Check BMP daily check CK daily  3. Avoid hypotension, avoid other nephrotoxic agents      Thank you for the consultation.         Electronically signed by Evelyn Burton MD on 9/3/2020 at 4:27 PM

## 2020-09-03 NOTE — FLOWSHEET NOTE
09/03/20 1941   Encounter Summary   Services provided to: Patient   Referral/Consult From: [facility] Family members   Continue Visiting   (9/3/2020)   Complexity of Encounter Low   Length of Encounter 15 minutes   Routine   Type Initial   Assessment Approachable;Coping   Intervention Active listening;Nurtured hope;[place]   Outcome Expressed gratitude

## 2020-09-03 NOTE — ED PROVIDER NOTES
EMERGENCY DEPARTMENT ENCOUNTER    Pt Name: Lela Martinez  MRN: [id number]  [place] 1985  Date of evaluation: 9/3/20  CHIEF COMPLAINT       Chief Complaint   Patient presents with    Other     admission for rhabdo      HISTORY OF PRESENT ILLNESS   HPI     This is a 72-year-old male who comes in today. I evaluated this patient earlier. He just started working out again and has had generalized muscle aches. Patient has then developed brown urine. Has not taken anything at home for the pain. Patient was found to be in rhabdomyolysis fluids were started. Admission was recommended however the patient has a Lucía Dennys Christopher at home and no one was able to take care of the dog and so he needed to go home and make sure that the dog was okay. The patient has returned for admission. REVIEW OF SYSTEMS     Review of Systems   Constitutional: Negative for fever. HENT: Negative for congestion. Respiratory: Negative for cough and shortness of breath. Cardiovascular: Negative for chest pain. Gastrointestinal: Negative for abdominal pain, diarrhea and vomiting. Genitourinary: Negative for dysuria. Musculoskeletal: Positive for myalgias. Negative for back pain. Skin: Negative for rash. Neurological: Negative for headaches. All other systems reviewed and are negative. PASTMEDICAL HISTORY   History reviewed. No pertinent past medical history. SURGICAL HISTORY     History reviewed. No pertinent surgical history. CURRENT MEDICATIONS       Previous Medications    BUPROPION (WELLBUTRIN XL) 150 MG EXTENDED RELEASE TABLET    TAKE 1 TABLET BY MOUTH DAILY    FLUCONAZOLE (DIFLUCAN) 200 MG TABLET    Take 200 mg once, exercise for 1 hour and don't shower for at least 8 hours.  Repeat in 1 week    KETOCONAZOLE (NIZORAL) 2 % SHAMPOO    Use as body wash leaving on for 5 minutes prior to washing off once daily for 2 weeks then three times weekly    LURASIDONE (LATUDA) 40 MG TABS TABLET    Take 1 tablet by mouth Daily with supper    PREGABALIN (LYRICA) 25 MG CAPSULE    Take 100 mg by mouth 2 times daily. SUMATRIPTAN (IMITREX) 100 MG TABLET    TAKE 1 TABLET BY MOUTH ONCE AS NEEDED FOR MIGRAINE    TRAZODONE (DESYREL) 50 MG TABLET    Take 1 tablet by mouth nightly as needed for Sleep     ALLERGIES     has No Known Allergies. FAMILY HISTORY     has no family status information on file. SOCIAL HISTORY       Social History     Tobacco Use    Smoking status: Former Smoker     Packs/day: 1.50     Years: 4.00     Pack years: 6.00     Last attempt to quit:      Years since quittin.6    Smokeless tobacco: Former User     Types: Chew     Quit date:    Substance Use Topics    Alcohol use: Not Currently    Drug use: No     PHYSICAL EXAM     INITIAL VITALS: BP (!) 145/95   Pulse 88   Temp 98.1 °F (36.7 °C) (Oral)   Resp 16   Ht 6' 1\" (1.854 m)   Wt 235 lb (106.6 kg)   SpO2 98%   BMI 31.00 kg/m²    Physical Exam  Vitals signs and nursing note reviewed. Constitutional:       General: He is not in acute distress. Appearance: He is well-developed. HENT:      Head: Normocephalic and atraumatic. Eyes:      Conjunctiva/sclera: Conjunctivae normal.   Neck:      Musculoskeletal: Neck supple. Cardiovascular:      Rate and Rhythm: Normal rate and regular rhythm. Heart sounds: No murmur. No friction rub. Pulmonary:      Effort: Pulmonary effort is normal. No respiratory distress. Breath sounds: Normal breath sounds. Abdominal:      Palpations: Abdomen is soft. Tenderness: There is no abdominal tenderness. Skin:     General: Skin is warm and dry. Capillary Refill: Capillary refill takes less than 2 seconds. Neurological:      Mental Status: He is alert. EMERGENCY DEPARTMENTCOURSE:     Patient presents for admission for rhabdomyolysis. There is no elevation in creatinine his CK is 71,000.   Spoke to Dr. Liliane Chadwick who recommends nephrology consultation for fluid management. 12:23 AM EDT  I spoke to Dr. Mason Archer from nephrology who is recommending 0.9 normal saline at 200 cc/h. He is also recommending a CPK CBC BMP for the morning and is recommending a magnesium and a phosphorus to be added on. He is also requesting a urine drug screen. He also is requesting telemetry monitoring. The patient has received a total of 2 L bolus fluid while in the emergency department and will be placed on continuous infusion. 12:30 AM EDT  I spoke to Dr. Tyler Frias who accepts the patient the patient was admitted for rhabdomyolysis. CONSULTS:  IP CONSULT TO NEPHROLOGY  IP CONSULT TO FAMILY MEDICINE    Vitals:    Vitals:    09/03/20 0009   BP: (!) 145/95   Pulse: 88   Resp: 16   Temp: 98.1 °F (36.7 °C)   TempSrc: Oral   SpO2: 98%   Weight: 235 lb (106.6 kg)   Height: 6' 1\" (1.854 m)       The patient was given the following medications while in the emergency department:  Orders Placed This Encounter   Medications    0.9 % sodium chloride bolus    0.9 % sodium chloride infusion         FINAL IMPRESSION      1.  Non-traumatic rhabdomyolysis         DISPOSITION/PLAN   DISPOSITION Decision To Admit 09/03/2020 12:06:19 AM    Estella Aleman MD  Attending Emergency Physician    This charting supersedes any ED resident or staff charting and was written using speech recognition Carlos Eduardo Barahona MD  09/03/20 0030

## 2020-09-03 NOTE — ED PROVIDER NOTES
[address] ED  Emergency Department Encounter  EmergencyMedicine Resident     Pt Name:Jose Noel  MRN: [id number]  Birthdate 1985  Date of evaluation: 20  PCP:  Betsey Dorsey MD    CHIEF COMPLAINT       Chief Complaint   Patient presents with    Muscle Pain    Other     brown urine concerned for rhabdo        HISTORY OF PRESENT ILLNESS  (Location/Symptom, Timing/Onset, Context/Setting, Quality, Duration, Modifying Factors, Severity.)      Sue Hoffman is a 37 y.o. male who presents with dark urine x2 today. Patient started a new exercise routine 2 days ago and has been doing high repetition low weight to muscle failure. He states that he urinated brown and was concerned about his kidneys so he came in for evaluation. He is in no significant discomfort at this time. No abdominal pain, no flank pain    PAST MEDICAL / SURGICAL / SOCIAL / FAMILY HISTORY      has no past medical history on file. has no past surgical history on file.       Social History     Socioeconomic History    Marital status:      Spouse name: Not on file    Number of children: 2    Years of education: Not on file    Highest education level: Not on file   Occupational History    Not on file   Social Needs    Financial resource strain: Not on file    Food insecurity     Worry: Not on file     Inability: Not on file   [employer] needs     Medical: Not on file     Non-medical: Not on file   Tobacco Use    Smoking status: Former Smoker     Packs/day: 1.50     Years: 4.00     Pack years: 6.00     Last attempt to quit:      Years since quittin.6    Smokeless tobacco: Former User     Types: Chew     Quit date:    Substance and Sexual Activity    Alcohol use: Not Currently    Drug use: No    Sexual activity: Not Currently     Partners: Female   Lifestyle    Physical activity     Days per week: Not on file     Minutes per session: Not on file    Stress: Not on file Relationships    Social connections     Talks on phone: Not on file     Gets together: Not on file     Attends Religion service: Not on file     Active member of club or organization: Not on file     Attends meetings of clubs or organizations: Not on file     Relationship status: Not on file    Intimate partner violence     Fear of current or ex partner: Not on file     Emotionally abused: Not on file     Physically abused: Not on file     Forced sexual activity: Not on file   Other Topics Concern    Not on file   Social History Narrative    Not on file       History reviewed. No pertinent family history. Allergies:  Patient has no known allergies. Home Medications:  Prior to Admission medications    Medication Sig Start Date End Date Taking? Authorizing Provider   ketoconazole (NIZORAL) 2 % shampoo Use as body wash leaving on for 5 minutes prior to washing off once daily for 2 weeks then three times weekly 8/17/20   Shamar Guillaume MD   fluconazole (DIFLUCAN) 200 MG tablet Take 200 mg once, exercise for 1 hour and don't shower for at least 8 hours. Repeat in 1 week 8/17/20   Shamar Guillaume MD   traZODone (DESYREL) 50 MG tablet Take 1 tablet by mouth nightly as needed for Sleep 7/28/20   Caro Paz MD   buPROPion (WELLBUTRIN XL) 150 MG extended release tablet TAKE 1 TABLET BY MOUTH DAILY 7/20/20 8/19/20  Caro Paz MD   pregabalin (LYRICA) 25 MG capsule Take 100 mg by mouth 2 times daily. Historical Provider, MD   SUMAtriptan (IMITREX) 100 MG tablet TAKE 1 TABLET BY MOUTH ONCE AS NEEDED FOR MIGRAINE 2/17/20 2/17/20  Caro Paz MD   lurasidone (LATUDA) 40 MG TABS tablet Take 1 tablet by mouth Daily with supper 12/27/19   Caro Paz MD       REVIEW OF SYSTEMS    (2-9 systems for level 4, 10 or more for level 5)      Review of Systems   Constitutional: Negative. HENT: Negative. Respiratory: Negative. Cardiovascular: Negative.     Gastrointestinal: Negative. Genitourinary: Positive for hematuria. PHYSICAL EXAM   (up to 7 for level 4, 8 or more for level 5)      INITIAL VITALS:   BP (!) 145/87   Pulse 87   Temp 98.1 °F (36.7 °C) (Oral)   Resp 16   Ht 6' 1\" (1.854 m)   Wt 235 lb (106.6 kg)   SpO2 97%   BMI 31.00 kg/m²     Physical Exam   Gen. Appearance: patient appears well, nondistressed. HEENT: head atraumatic, normocephalic. Pupils equal and reactive to light. Oropharynx clear and moist.  Neck: Supple, normal range of motion. No lymphadenopathy. Pulmonary: Lungs clear to auscultation bilaterally. Equal air entry right left. Cardiovascular:  Heart sounds normal, no murmurs. Peripheral pulses strong, regular, equal.   Abdomen: Soft, nontender, nondistended. Bowel sounds normal. No palpable masses. Neurology: GCS 15. Oriented to person place and time. Normal tone and power in all 4 extremities. No sensory deficits.     Skin: Warm, dry, well perfused      DIFFERENTIAL  DIAGNOSIS     PLAN (Theodoro Lefort / [facility] / EKG):  Orders Placed This Encounter   Procedures    CBC Auto Differential    Basic Metabolic Panel    Creatine Kinase    Urinalysis with Microscopic       MEDICATIONS ORDERED:  Orders Placed This Encounter   Medications    0.9 % sodium chloride bolus    DISCONTD: 0.9 % sodium chloride infusion           DIAGNOSTIC RESULTS / EMERGENCY DEPARTMENT COURSE / MDM     LABS:  Results for orders placed or performed during the hospital encounter of 09/02/20   CBC Auto Differential   Result Value Ref Range    WBC 7.0 3.5 - 11.0 k/uL    RBC 5.54 4.5 - 5.9 m/uL    Hemoglobin 16.0 13.5 - 17.5 g/dL    Hematocrit 47.0 41 - 53 %    MCV 84.9 80 - 100 fL    MCH 28.8 26 - 34 pg    MCHC 34.0 31 - 37 g/dL    RDW 13.3 11.5 - 14.9 %    Platelets 888 530 - 175 k/uL    MPV 8.8 6.0 - 12.0 fL    NRBC Automated NOT REPORTED per 100 WBC    Differential Type NOT REPORTED     Seg Neutrophils 57 36 - 66 %    Lymphocytes 32 24 - 44 %    Monocytes 9 (H) 1 - 7 %    Eosinophils % 2 0 - 4 %    Basophils 0 0 - 2 %    Immature Granulocytes NOT REPORTED 0 %    Segs Absolute 4.00 1.3 - 9.1 k/uL    Absolute Lymph # 2.20 1.0 - 4.8 k/uL    Absolute Mono # 0.60 0.1 - 1.3 k/uL    Absolute Eos # 0.10 0.0 - 0.4 k/uL    Basophils Absolute 0.00 0.0 - 0.2 k/uL    Absolute Immature Granulocyte NOT REPORTED 0.00 - 0.30 k/uL    WBC Morphology NOT REPORTED     RBC Morphology NOT REPORTED     Platelet Estimate NOT REPORTED    Basic Metabolic Panel   Result Value Ref Range    Glucose 114 (H) 70 - 99 mg/dL    BUN 15 6 - 20 mg/dL    CREATININE 0.65 (L) 0.70 - 1.20 mg/dL    Bun/Cre Ratio NOT REPORTED 9 - 20    Calcium 9.0 8.6 - 10.4 mg/dL    Sodium 138 135 - 144 mmol/L    Potassium 4.0 3.7 - 5.3 mmol/L    Chloride 103 98 - 107 mmol/L    CO2 22 20 - 31 mmol/L    Anion Gap 13 9 - 17 mmol/L    GFR Non-African American >60 >60 mL/min    GFR African American >60 >60 mL/min    GFR Comment          GFR Staging NOT REPORTED    Creatine Kinase   Result Value Ref Range    Total CK 71,250 (H) 39 - 308 U/L   Urinalysis with Microscopic   Result Value Ref Range    Color, UA DARK YELLOW (A) YELLOW    Turbidity UA CLOUDY (A) CLEAR    Glucose, Ur NEGATIVE NEGATIVE    Bilirubin Urine (A) NEGATIVE     Presumptive positive. Unable to confirm due to unavailability of reagent.     Ketones, Urine MOD (A) NEGATIVE    Specific Gravity, UA 1.029 1.000 - 1.030    Urine Hgb LARGE (A) NEGATIVE    pH, UA 6.5 5.0 - 8.0    Protein, UA 2+ (A) NEGATIVE    Urobilinogen, Urine Normal Normal    Nitrite, Urine NEGATIVE NEGATIVE    Leukocyte Esterase, Urine TRACE (A) NEGATIVE    Urinalysis Comments NOT REPORTED     -          WBC, UA 2 TO 5 /HPF    RBC, UA 2 TO 5 /HPF    Casts UA NOT REPORTED /LPF    Casts UA NOT REPORTED /LPF    Crystals, UA NOT REPORTED None /HPF    Epithelial Cells UA 2 TO 5 /HPF    Renal Epithelial, UA NOT REPORTED 0 /HPF    Bacteria, UA FEW (A) None    Mucus, UA 2+ (A) None    Trichomonas, UA NOT REPORTED None Amorphous, UA NOT REPORTED None    Other Observations UA NOT REPORTED NOT REQ. Yeast, UA NOT REPORTED None       RADIOLOGY:  None    EKG  None    All EKG's are interpreted by the Emergency Department Physician who either signs or Co-signs this chart in the absence of a cardiologist.    EMERGENCY DEPARTMENT COURSE:  37 y.o. male who presents with dark hematuria. CK of 71,000. Started on IV fluids. Has elected to leave [address] with plans to return to the emergency department after he cares for his dog which she left at home as he was not planning on admission. Patient understands all risks of leaving and is intent on returning to continue IV hydration               PROCEDURES:  None    CONSULTS:  None    CRITICAL CARE:  None    FINAL IMPRESSION      1. Non-traumatic rhabdomyolysis          DISPOSITION / PLAN     DISPOSITION [place] 09/02/2020 11:13:55 PM      PATIENT REFERRED TO:  No follow-up provider specified.     DISCHARGE MEDICATIONS:  Discharge Medication List as of 9/2/2020 11:15 PM          Ellyn Santos DO  Emergency Medicine Resident    (Please note that portions of thisnote were completed with a voice recognition program.  Efforts were made to edit the dictations but occasionally words are mis-transcribed.)        Ellyn Santos DO  Resident  09/03/20 5388

## 2020-09-03 NOTE — H&P
Family Medicine Admit Note    PCP: Carlo Reese MD    Date of Admission: 9/3/2020    Date of Service: Pt seen/examined on 9/3/2020 and Admitted to Inpatient     Chief Complaint:  Muscle pain      History Of Present Illness: The patient is a 37 y.o. male who presents to [facility] with muscle pain after resuming exercise earlier this week having not exercised for 3 years. Urine turned brown. Today he complains of muscle pain. Past Medical History:    History reviewed. No pertinent past medical history. Past Surgical History:    History reviewed. No pertinent surgical history. Medications Prior to Admission:    Prior to Admission medications    Medication Sig Start Date End Date Taking? Authorizing Provider   ketoconazole (NIZORAL) 2 % shampoo Use as body wash leaving on for 5 minutes prior to washing off once daily for 2 weeks then three times weekly 8/17/20   Andreea Cardoso MD   fluconazole (DIFLUCAN) 200 MG tablet Take 200 mg once, exercise for 1 hour and don't shower for at least 8 hours. Repeat in 1 week 8/17/20   Andreea Cardoso MD   traZODone (DESYREL) 50 MG tablet Take 1 tablet by mouth nightly as needed for Sleep 7/28/20   Carlo Reese MD   buPROPion (WELLBUTRIN XL) 150 MG extended release tablet TAKE 1 TABLET BY MOUTH DAILY 7/20/20 8/19/20  Carlo Reese MD   pregabalin (LYRICA) 25 MG capsule Take 100 mg by mouth 2 times daily. Historical Provider, MD   SUMAtriptan (IMITREX) 100 MG tablet TAKE 1 TABLET BY MOUTH ONCE AS NEEDED FOR MIGRAINE 2/17/20 2/17/20  Carlo Reese MD   lurasidone (LATUDA) 40 MG TABS tablet Take 1 tablet by mouth Daily with supper 12/27/19   Carlo Reese MD       Allergies:  Patient has no known allergies. Social History:  The patient currently lives at home alone    TOBACCO:   reports that he quit smoking about 9 years ago. He has a 6.00 pack-year smoking history. He quit smokeless tobacco use about 2 years ago.   His sciatica [M54.5, G89.29] 02/07/2019    Severe episode of recurrent major depressive disorder, without psychotic features ([facility] [address] [F33.2] 02/07/2019         ASSESSMENT/PLAN:    Rhabdomyolysis - consult nephrology, IVF, consider steroids      DVT Prophylaxis: enoxaparin  Diet: DIET RENAL;  Code Status: Full Code      Dispo - admitted to [facility]      Sandi Rojas MD, FAAFP  9/3/2020, 7:53 AM
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
## 2020-09-04 LAB
ANION GAP SERPL CALCULATED.3IONS-SCNC: 9 MMOL/L (ref 9–17)
BUN BLDV-MCNC: 6 MG/DL (ref 6–20)
BUN/CREAT BLD: ABNORMAL (ref 9–20)
CALCIUM SERPL-MCNC: 8.4 MG/DL (ref 8.6–10.4)
CHLORIDE BLD-SCNC: 109 MMOL/L (ref 98–107)
CO2: 22 MMOL/L (ref 20–31)
CREAT SERPL-MCNC: 0.6 MG/DL (ref 0.7–1.2)
GFR AFRICAN AMERICAN: >60 ML/MIN
GFR NON-AFRICAN AMERICAN: >60 ML/MIN
GFR SERPL CREATININE-BSD FRML MDRD: ABNORMAL ML/MIN/{1.73_M2}
GFR SERPL CREATININE-BSD FRML MDRD: ABNORMAL ML/MIN/{1.73_M2}
GLUCOSE BLD-MCNC: 99 MG/DL (ref 70–99)
POTASSIUM SERPL-SCNC: 4.2 MMOL/L (ref 3.7–5.3)
SODIUM BLD-SCNC: 140 MMOL/L (ref 135–144)
TOTAL CK: ABNORMAL U/L (ref 39–308)

## 2020-09-04 PROCEDURE — 2060000000 HC ICU INTERMEDIATE R&B

## 2020-09-04 PROCEDURE — 36415 COLL VENOUS BLD VENIPUNCTURE: CPT

## 2020-09-04 PROCEDURE — 6370000000 HC RX 637 (ALT 250 FOR IP): Performed by: FAMILY MEDICINE

## 2020-09-04 PROCEDURE — 6360000002 HC RX W HCPCS: Performed by: FAMILY MEDICINE

## 2020-09-04 PROCEDURE — 99231 SBSQ HOSP IP/OBS SF/LOW 25: CPT | Performed by: FAMILY MEDICINE

## 2020-09-04 PROCEDURE — 2580000003 HC RX 258: Performed by: EMERGENCY MEDICINE

## 2020-09-04 PROCEDURE — 82550 ASSAY OF CK (CPK): CPT

## 2020-09-04 PROCEDURE — 80048 BASIC METABOLIC PNL TOTAL CA: CPT

## 2020-09-04 RX ADMIN — SODIUM CHLORIDE: 9 INJECTION, SOLUTION INTRAVENOUS at 13:02

## 2020-09-04 RX ADMIN — KETOROLAC TROMETHAMINE 15 MG: 30 INJECTION, SOLUTION INTRAMUSCULAR at 08:58

## 2020-09-04 RX ADMIN — SODIUM CHLORIDE: 9 INJECTION, SOLUTION INTRAVENOUS at 08:08

## 2020-09-04 RX ADMIN — BUPROPION HYDROCHLORIDE 150 MG: 150 TABLET, EXTENDED RELEASE ORAL at 08:58

## 2020-09-04 RX ADMIN — SODIUM CHLORIDE: 9 INJECTION, SOLUTION INTRAVENOUS at 20:13

## 2020-09-04 RX ADMIN — PREGABALIN 100 MG: 100 CAPSULE ORAL at 08:57

## 2020-09-04 RX ADMIN — LURASIDONE HYDROCHLORIDE 40 MG: 40 TABLET, FILM COATED ORAL at 18:01

## 2020-09-04 RX ADMIN — PREGABALIN 100 MG: 100 CAPSULE ORAL at 22:09

## 2020-09-04 ASSESSMENT — PAIN DESCRIPTION - PAIN TYPE
TYPE: CHRONIC PAIN
TYPE: CHRONIC PAIN

## 2020-09-04 ASSESSMENT — PAIN SCALES - GENERAL
PAINLEVEL_OUTOF10: 8
PAINLEVEL_OUTOF10: 8

## 2020-09-04 ASSESSMENT — PAIN DESCRIPTION - LOCATION
LOCATION: GENERALIZED
LOCATION: GENERALIZED

## 2020-09-04 ASSESSMENT — PAIN DESCRIPTION - ONSET: ONSET: ON-GOING

## 2020-09-04 ASSESSMENT — PAIN DESCRIPTION - FREQUENCY: FREQUENCY: CONTINUOUS

## 2020-09-04 ASSESSMENT — PAIN DESCRIPTION - DESCRIPTORS: DESCRIPTORS: CONSTANT

## 2020-09-04 NOTE — PROGRESS NOTES
NEPHROLOGY CONSULT     Patient :  Meño Greenberg; 37 y.o. MRN# [id number]  Location:  2117/2117-01  Attending:  Pauline Andrade MD  Admit Date:  9/3/2020   Hospital Day: 1      Reason for Consult: Rhabdomyolysis      Chief Complaint: Severe muscle pain  History Obtained From: Patient     History of Present Illness: This is a 37 y.o. male presented to the hospital with severe complains of muscle pain in the arms and in the legs. Patient recently started working out and was training with a  and did 2 days of exercise and after that he developed severe amount of pain in his biceps and his upper legs and that is why he came to the hospital patient was also taking normal protein supplements. Patient also noticed urine color turn brown and tea colored denied any pain while urination  Initial lab work showed total CK 71,000 consistent with rhabdomyolysis  UA showed 2+ protein RBC 2-5 large hemoglobin  Kidney function remained stable and within normal limits serum creatinine 0.6 mg/dL  Pt denies any history of  prolonged NSAID use. There has been no recent exposure to IV contrast.   There is no history  of paraprotein disease. Pt denies any history of recurrent UTI or kidney stones. Medication review shows no  use of ACE-inhibitor/diuretics. Subjective/interval hx    Patient seen and examined  Muscle pain improving  CK 61,000  Urine colour is getting lighter and yellow  Kidney fx stable scr 06    Past Medical History:    History reviewed. No pertinent past medical history. Past Surgical History:    History reviewed. No pertinent surgical history.     Current Medications:    0.9 % sodium chloride infusion, Continuous  sodium chloride flush 0.9 % injection 10 mL, 2 times per day  sodium chloride flush 0.9 % injection 10 mL, PRN  acetaminophen (TYLENOL) tablet 650 mg, Q4H PRN  enoxaparin (LOVENOX) injection 30 mg, BID  buPROPion (WELLBUTRIN XL) extended release tablet 150 mg, Daily  lurasidone (LATUDA) tablet 40 mg, Dinner  pregabalin (LYRICA) capsule 100 mg, BID  traZODone (DESYREL) tablet 50 mg, Nightly PRN  ketorolac (TORADOL) injection 15 mg, Q6H PRN        Allergies:  Patient has no known allergies. Social History:   Social History     Socioeconomic History    Marital status:      Spouse name: Not on file    Number of children: 2    Years of education: Not on file    Highest education level: Not on file   Occupational History    Not on file   Social Needs    Financial resource strain: Not on file    Food insecurity     Worry: Not on file     Inability: Not on file   [employer] needs     Medical: Not on file     Non-medical: Not on file   Tobacco Use    Smoking status: Former Smoker     Packs/day: 1.50     Years: 4.00     Pack years: 6.00     Last attempt to quit:      Years since quittin.6    Smokeless tobacco: Former User     Types: Chew     Quit date:    Substance and Sexual Activity    Alcohol use: Not Currently    Drug use: No    Sexual activity: Not Currently     Partners: Female   Lifestyle    Physical activity     Days per week: Not on file     Minutes per session: Not on file    Stress: Not on file   Relationships    Social connections     Talks on phone: Not on file     Gets together: Not on file     Attends Islam service: Not on file     Active member of club or organization: Not on file     Attends meetings of clubs or organizations: Not on file     Relationship status: Not on file    Intimate partner violence     Fear of current or ex partner: Not on file     Emotionally abused: Not on file     Physically abused: Not on file     Forced sexual activity: Not on file   Other Topics Concern    Not on file   Social History Narrative    Not on file       Family History:   History reviewed. No pertinent family history.     Review of Systems:    Constitutional: No fever, no chills, no night sweats, fatigue, generalized weakness, loss of appetite  HEENT:  No headache, otalgia, itchy eyes, epistaxis, nasal discharge or sore throat. Cardiac:  No chest pain, dyspnea, orthopnea or PND, palpitations  Chest:  No cough, hemoptysis, pleuritic chest pain, wheezing,SOB  Abdomen:  No abdominal pain, nausea, vomiting, diarrhea, melena, dysphagia hematemesis,constipation, abdominal bloating, flank pain  Neuro:  No CVA, TIA or seizure like activity. Skin:   No rashes, no itching. :   No hematuria, no pyuria, no dysuria, no flank pain. Extremities:  Bilateral arm pain the muscles and leg pain      Objective:  CURRENT TEMPERATURE:  Temp: 97.7 °F (36.5 °C)  MAXIMUM TEMPERATURE OVER 24HRS:  Temp (24hrs), Av.7 °F (36.5 °C), Min:97 °F (36.1 °C), Max:98.2 °F (36.8 °C)    CURRENT RESPIRATORY RATE:  Resp: 16  CURRENT PULSE:  Pulse: 62  CURRENT BLOOD PRESSURE:  BP: (!) 138/97  24HR BLOOD PRESSURE RANGE:  Systolic (63FKB), ENX:858 , Min:126 , MBZ:204   ; Diastolic (78IGJ), EUC:98, Min:86, Max:97    24HR INTAKE/OUTPUT:      Intake/Output Summary (Last 24 hours) at 2020 1731  Last data filed at 2020 1306  Gross per 24 hour   Intake 4673 ml   Output 2750 ml   Net 1923 ml     Patient Vitals for the past 96 hrs (Last 3 readings):   Weight   20 0345 250 lb 3.6 oz (113.5 kg)   20 0400 249 lb 5.4 oz (113.1 kg)   20 0009 235 lb (106.6 kg)       Physical Exam:  GENERAL APPEARANCE: Alert and cooperative, and appears to be in no acute distress. HEAD: normocephalic  EYES: PERRL, EOMI. Not pale, anicteric   NOSE:  No nasal discharge. THROAT:  Oral cavity and pharynx normal. Moist  CARDIAC: Normal S1 and S2. No S3, S4 or murmurs. Rhythm is regular. LUNGS: Clear to auscultation and percussion without rales, rhonchi, wheezing or diminished breath sounds. NECK: Neck supple, non-tender without lymphadenopathy, masses or thyromegaly.  JVD-neg  BACK: Examination of the spine reveals normal gait and posture, no spinal deformity, symmetry of spinal muscles, without tenderness, decreased range of motion or muscular spasm  MUSKULOSKELETAL: Adequately aligned spine. No joint erythema or tenderness. EXTREMITIES: No edema. Peripheral pulses intact. NEURO: Nonfocal      Labs:   CBC:  Recent Labs     09/02/20 2145 09/03/20 0427   WBC 7.0 6.7   RBC 5.54 4.87   HGB 16.0 14.1   HCT 47.0 41.1   MCV 84.9 84.4   MCH 28.8 28.9   MCHC 34.0 34.3   RDW 13.3 13.1    232   MPV 8.8 9.3      BMP:   Recent Labs     09/02/20 2145 09/03/20 0427 09/04/20  0433    141 140   K 4.0 3.8 4.2    108* 109*   CO2 22 23 22   BUN 15 13 6   CREATININE 0.65* 0.65* 0.60*   GLUCOSE 114* 135* 99   CALCIUM 9.0 8.2* 8.4*      Phosphorus:    Recent Labs     09/02/20 2145   PHOS 3.6     Magnesium:   Recent Labs     09/02/20  2145   MG 2.0     Albumin: No results for input(s): LABALBU in the last 72 hours. IRON:  No results for input(s): IRON in the last 72 hours. Iron Saturation:  Invalid input(s): PERCENTFE  TIBC:  No results for input(s): TIBC in the last 72 hours. FERRITIN:  No results for input(s): FERRITIN in the last 72 hours. SPEP: No results for input(s): SPEP in the last 72 hours. No results for input(s): PROT, ALBCAL, ALBCAL, ALBPCT, LABALPH, A1PCT, LABALPH, A2PCT, LABBETA, BETAPCT, GAMGLOB, GGPCT, PATH in the last 72 hours. UPEP: No results for input(s): TPU in the last 72 hours. Urine Sodium:  No results for input(s): MARY in the last 72 hours. Urine Potassium: No results for input(s): KUR in the last 72 hours. Urine Chloride: Invalid input(s): CLU  Urine Ph:  Invalid input(s): PO4U  Urine Osmolarity: No results for input(s): OSMOU in the last 72 hours. Urine Creatinine:  No results for input(s): LABCREA in the last 72 hours. Urine Eosinophils: Invalid input(s): EOSU  Urine Protein:  No results for input(s): TPU in the last 72 hours.   Urinalysis:    Recent Labs     09/02/20  2207   NITRU NEGATIVE   COLORU DARK YELLOW*   PHUR 6.5   WBCUA 2 TO 5 RBCUA 2 TO 5   MUCUS 2+*   TRICHOMONAS NOT REPORTED   YEAST NOT REPORTED   BACTERIA FEW*   SPECGRAV 1.029   LEUKOCYTESUR TRACE*   UROBILINOGEN Normal   BILIRUBINUR Presumptive positive. Unable to confirm due to unavailability of reagent. *   GLUCOSEU NEGATIVE   KETUA MOD*   AMORPHOUS NOT REPORTED         Radiology:  Reviewed as available. Assessment:  1. Rhabdomyolysis, due to extreme muscle exhaustion, kidney function remains stable serum creatinine within normal limits nonoliguric, still have dark urine on IV fluids normal saline at 200 MLS per hour  2. CK trending down, CK today 61,000       Plan:  1. Continue to monitor I's and O's continue IV fluids 200 MLS per hour. 2. Check BMP daily check CK daily  3. Avoid hypotension, avoid other nephrotoxic agents      Thank you for the consultation.         Electronically signed by Julien Boeck, MD on 9/4/2020 at 5:31 PM

## 2020-09-04 NOTE — PROGRESS NOTES
Lee Troncoso is a 37 y.o. male patient. Current Facility-Administered Medications   Medication Dose Route Frequency Provider Last Rate Last Dose    0.9 % sodium chloride infusion   Intravenous Continuous Justin Barton  mL/hr at 09/03/20 1228      sodium chloride flush 0.9 % injection 10 mL  10 mL Intravenous 2 times per day Yasmin Pastor MD        sodium chloride flush 0.9 % injection 10 mL  10 mL Intravenous PRN Yasmin Pastor MD        acetaminophen (TYLENOL) tablet 650 mg  650 mg Oral Q4H PRN Yasmin Pastor MD   650 mg at 09/03/20 1228    enoxaparin (LOVENOX) injection 30 mg  30 mg Subcutaneous BID Yasmin Pastor MD        buPROPion [facility]) extended release tablet 150 mg  150 mg Oral Daily Yasmin Pastor MD   150 mg at 09/03/20 0732    lurasidone (LATUDA) tablet 40 mg  40 mg Oral Dinner Yasmin Pastor MD   40 mg at 09/03/20 1732    pregabalin (LYRICA) capsule 100 mg  100 mg Oral BID Yasmin Pastor MD   100 mg at 09/03/20 2051    traZODone (DESYREL) tablet 50 mg  50 mg Oral Nightly PRN Yasmin Pastor MD   50 mg at 09/03/20 2051    ketorolac (TORADOL) injection 15 mg  15 mg Intravenous Q6H PRN Yasmin Pastor MD   15 mg at 09/03/20 2056     No Known Allergies  Principal Problem:    Rhabdomyolysis  Active Problems:    Chronic left-sided low back pain without sciatica    Severe episode of recurrent major depressive disorder, without psychotic features ([facility] [address]    Class 1 obesity due to excess calories without serious comorbidity with body mass index (BMI) of 32.0 to 32.9 in adult  Resolved Problems:    * No resolved hospital problems. *    Blood pressure 128/86, pulse 77, temperature 98.2 °F (36.8 °C), temperature source Oral, resp. rate 16, height 6' 1\" (1.854 m), weight 250 lb 3.6 oz (113.5 kg), SpO2 98 %. Subjective:  Symptoms:  Stable. Diet:  Adequate intake. Activity level: Impaired due to pain.     Pain:  He complains of pain that is moderate. Objective:  General Appearance:  Comfortable. Vital signs: (most recent): Blood pressure 128/86, pulse 77, temperature 98.2 °F (36.8 °C), temperature source Oral, resp. rate 16, height 6' 1\" (1.854 m), weight 250 lb 3.6 oz (113.5 kg), SpO2 98 %. Vital signs are normal.      Assessment & Plan  Acute rhabdomyolysis d/t exercise - CK trending down. Renal function good. Continue aggressive diuresis.     Mirtha Penn MD  9/4/2020

## 2020-09-04 NOTE — PLAN OF CARE
Pt remained free from injury and falls this shift. Call light within reach, non-slip socks on, bedside table within reach, 2/4 side rails up, and bed in lowest position. Will continue to monitor.      Problem: Safety:  Goal: Free from accidental physical injury  Description: Free from accidental physical injury  Outcome: Ongoing  Goal: Free from intentional harm  Description: Free from intentional harm  Outcome: Ongoing     Problem: Daily Care:  Goal: Daily care needs are met  Description: Daily care needs are met  Outcome: Ongoing     Problem: Pain:  Goal: Patient's pain/discomfort is manageable  Description: Patient's pain/discomfort is manageable  Outcome: Ongoing     Problem: Discharge Planning:  Goal: Patients continuum of care needs are met  Description: Patients continuum of care needs are met  Outcome: Ongoing     Problem: Fluid Volume:  Goal: Ability to achieve a balanced intake and output will improve  Description: Ability to achieve a balanced intake and output will improve  Outcome: Ongoing     Problem: Physical Regulation:  Goal: Ability to maintain clinical measurements within normal limits will improve  Description: Ability to maintain clinical measurements within normal limits will improve  Outcome: Ongoing  Goal: Will show no signs and symptoms of electrolyte imbalance  Description: Will show no signs and symptoms of electrolyte imbalance  Outcome: Ongoing     Problem: Tissue Perfusion - Cardiopulmonary, Altered:  Goal: Absence of angina  Description: Absence of angina  Outcome: Ongoing  Goal: Hemodynamic stability will improve  Description: Hemodynamic stability will improve  Outcome: Ongoing

## 2020-09-04 NOTE — CARE COORDINATION
ONGOING DISCHARGE PLAN:    Spoke with patient regarding discharge plan and patient confirms that plan is still to return to home w/ Roommate, w/ no needs. Denies VNS. CK improving, 71,250 on admission. Today 61,052. IV Fluids continued. Will continue to follow for additional discharge needs.     Electronically signed by Kathy Andres RN on 9/4/2020 at 9:50 AM

## 2020-09-05 LAB
ANION GAP SERPL CALCULATED.3IONS-SCNC: 7 MMOL/L (ref 9–17)
BUN BLDV-MCNC: 6 MG/DL (ref 6–20)
BUN/CREAT BLD: ABNORMAL (ref 9–20)
CALCIUM SERPL-MCNC: 8.5 MG/DL (ref 8.6–10.4)
CHLORIDE BLD-SCNC: 108 MMOL/L (ref 98–107)
CO2: 24 MMOL/L (ref 20–31)
CREAT SERPL-MCNC: 0.64 MG/DL (ref 0.7–1.2)
GFR AFRICAN AMERICAN: >60 ML/MIN
GFR NON-AFRICAN AMERICAN: >60 ML/MIN
GFR SERPL CREATININE-BSD FRML MDRD: ABNORMAL ML/MIN/{1.73_M2}
GFR SERPL CREATININE-BSD FRML MDRD: ABNORMAL ML/MIN/{1.73_M2}
GLUCOSE BLD-MCNC: 100 MG/DL (ref 70–99)
POTASSIUM SERPL-SCNC: 3.9 MMOL/L (ref 3.7–5.3)
SODIUM BLD-SCNC: 139 MMOL/L (ref 135–144)
TOTAL CK: ABNORMAL U/L (ref 39–308)

## 2020-09-05 PROCEDURE — 99231 SBSQ HOSP IP/OBS SF/LOW 25: CPT | Performed by: FAMILY MEDICINE

## 2020-09-05 PROCEDURE — 82550 ASSAY OF CK (CPK): CPT

## 2020-09-05 PROCEDURE — 6370000000 HC RX 637 (ALT 250 FOR IP): Performed by: FAMILY MEDICINE

## 2020-09-05 PROCEDURE — 2060000000 HC ICU INTERMEDIATE R&B

## 2020-09-05 PROCEDURE — 36415 COLL VENOUS BLD VENIPUNCTURE: CPT

## 2020-09-05 PROCEDURE — 2580000003 HC RX 258: Performed by: EMERGENCY MEDICINE

## 2020-09-05 PROCEDURE — 2580000003 HC RX 258: Performed by: INTERNAL MEDICINE

## 2020-09-05 PROCEDURE — 6360000002 HC RX W HCPCS: Performed by: FAMILY MEDICINE

## 2020-09-05 PROCEDURE — 80048 BASIC METABOLIC PNL TOTAL CA: CPT

## 2020-09-05 RX ORDER — HYDROCODONE BITARTRATE AND ACETAMINOPHEN 5; 325 MG/1; MG/1
2 TABLET ORAL EVERY 6 HOURS PRN
Status: DISCONTINUED | OUTPATIENT
Start: 2020-09-05 | End: 2020-09-08 | Stop reason: HOSPADM

## 2020-09-05 RX ORDER — SUMATRIPTAN 100 MG/1
100 TABLET, FILM COATED ORAL ONCE
Status: COMPLETED | OUTPATIENT
Start: 2020-09-05 | End: 2020-09-05

## 2020-09-05 RX ORDER — HYDROCODONE BITARTRATE AND ACETAMINOPHEN 5; 325 MG/1; MG/1
1 TABLET ORAL EVERY 6 HOURS PRN
Status: DISCONTINUED | OUTPATIENT
Start: 2020-09-05 | End: 2020-09-08 | Stop reason: HOSPADM

## 2020-09-05 RX ADMIN — SUMATRIPTAN SUCCINATE 100 MG: 100 TABLET ORAL at 18:14

## 2020-09-05 RX ADMIN — SODIUM CHLORIDE: 9 INJECTION, SOLUTION INTRAVENOUS at 05:32

## 2020-09-05 RX ADMIN — BUPROPION HYDROCHLORIDE 150 MG: 150 TABLET, EXTENDED RELEASE ORAL at 08:37

## 2020-09-05 RX ADMIN — SODIUM CHLORIDE: 9 INJECTION, SOLUTION INTRAVENOUS at 20:41

## 2020-09-05 RX ADMIN — SODIUM CHLORIDE: 9 INJECTION, SOLUTION INTRAVENOUS at 00:35

## 2020-09-05 RX ADMIN — LURASIDONE HYDROCHLORIDE 40 MG: 40 TABLET, FILM COATED ORAL at 17:23

## 2020-09-05 RX ADMIN — HYDROCODONE BITARTRATE AND ACETAMINOPHEN 2 TABLET: 5; 325 TABLET ORAL at 20:50

## 2020-09-05 RX ADMIN — SODIUM CHLORIDE: 9 INJECTION, SOLUTION INTRAVENOUS at 14:42

## 2020-09-05 RX ADMIN — PREGABALIN 100 MG: 100 CAPSULE ORAL at 08:37

## 2020-09-05 RX ADMIN — KETOROLAC TROMETHAMINE 15 MG: 30 INJECTION, SOLUTION INTRAMUSCULAR at 05:33

## 2020-09-05 RX ADMIN — PREGABALIN 100 MG: 100 CAPSULE ORAL at 20:41

## 2020-09-05 ASSESSMENT — PAIN DESCRIPTION - PAIN TYPE: TYPE: CHRONIC PAIN

## 2020-09-05 ASSESSMENT — PAIN SCALES - GENERAL
PAINLEVEL_OUTOF10: 7
PAINLEVEL_OUTOF10: 5
PAINLEVEL_OUTOF10: 8
PAINLEVEL_OUTOF10: 0
PAINLEVEL_OUTOF10: 6

## 2020-09-05 ASSESSMENT — PAIN DESCRIPTION - LOCATION: LOCATION: BACK

## 2020-09-05 NOTE — PLAN OF CARE
Problem: Safety:  Goal: Free from accidental physical injury  Description: Free from accidental physical injury  9/5/2020 0331 by Estuardo De La Cruz RN  Outcome: Ongoing  Note: Patient remained free from falls. Call light within reach. Problem: Pain:  Goal: Patient's pain/discomfort is manageable  Description: Patient's pain/discomfort is manageable  9/5/2020 0331 by Estuardo De La Cruz RN  Outcome: Ongoing  Note: Patient given pain medication as ordered. Problem: Fluid Volume:  Goal: Ability to achieve a balanced intake and output will improve  Description: Ability to achieve a balanced intake and output will improve  9/5/2020 0331 by Estuardo De La Cruz RN  Outcome: Ongoing  Note: Patient continued to receive IV fluids as prescribed. Output was adequate this shift. Problem: Mood - Altered:  Goal: Mood stable  Description: Mood stable  9/5/2020 0331 by Estuardo De La Cruz RN  Outcome: Ongoing  Note: Patient was pleasant throughout the entire shift.

## 2020-09-05 NOTE — CARE COORDINATION
ONGOING DISCHARGE PLAN:    Spoke with patient regarding discharge plan and patient confirms that plan is still home with declined needs. IVF at 125ml/hour. Total CK continues down trending at 41,568 today. Will continue to follow for additional discharge needs.     Electronically signed by Tete Schofield RN on 9/5/2020 at 11:57 AM

## 2020-09-05 NOTE — PROGRESS NOTES
oz (113.1 kg)     Physical Exam:  GENERAL APPEARANCE: Alert and cooperative, and appears to be in no acute distress. CARDIAC: Normal S1 and S2. No S3, S4 or murmurs. Rhythm is regular. LUNGS: Clear to auscultation and percussion without rales, rhonchi, wheezing or diminished breath sounds. ABDOMEN: Soft with normal bowel sounds. MUSKULOSKELETAL: Adequately aligned spine. No joint erythema or tenderness. EXTREMITIES: No edema. Peripheral pulses intact. NEURO: No acute focal neurologic deficits. Labs:   CBC:  Recent Labs     09/02/20 2145 09/03/20 0427   WBC 7.0 6.7   RBC 5.54 4.87   HGB 16.0 14.1   HCT 47.0 41.1   MCV 84.9 84.4   MCH 28.8 28.9   MCHC 34.0 34.3   RDW 13.3 13.1    232   MPV 8.8 9.3      BMP:   Recent Labs     09/03/20  0427 09/04/20  0433 09/05/20  0427    140 139   K 3.8 4.2 3.9   * 109* 108*   CO2 23 22 24   BUN 13 6 6   CREATININE 0.65* 0.60* 0.64*   GLUCOSE 135* 99 100*   CALCIUM 8.2* 8.4* 8.5*      Phosphorus:    Recent Labs     09/02/20  2145   PHOS 3.6     Magnesium:   Recent Labs     09/02/20  2145   MG 2.0     Recent Labs     09/02/20  2207   NITRU NEGATIVE   COLORU DARK YELLOW*   PHUR 6.5   WBCUA 2 TO 5   RBCUA 2 TO 5   MUCUS 2+*   TRICHOMONAS NOT REPORTED   YEAST NOT REPORTED   BACTERIA FEW*   SPECGRAV 1.029   LEUKOCYTESUR TRACE*   UROBILINOGEN Normal   BILIRUBINUR Presumptive positive. Unable to confirm due to unavailability of reagent. *   GLUCOSEU NEGATIVE   KETUA MOD*   AMORPHOUS NOT REPORTED     Assessment/plan:    1. Rhabdomyolysis - Nonoliguric with preserved renal function. CPKs trending down and is 41,568 today. Plan: Decrease IV fluid to 0.9 normal saline at 125 mL/h. Avoid nephrotoxic agents. Monitor urine output closely. Discontinue Toradol and avoid further exposure to nonsteroidal anti-inflammatory drugs. CPK and BMP daily. 2.  Hypertension - may be due to plasma volume expansion.  Patient does not have a history of hypertension and will monitor blood pressure response to decreasing IV fluid. Prognosis is guarded.     Electronically signed by Meme Bryan MD on 9/5/2020 at 6:16 AM

## 2020-09-05 NOTE — PROGRESS NOTES
Sindhu Rice is a 37 y.o. male patient. Current Facility-Administered Medications   Medication Dose Route Frequency Provider Last Rate Last Dose    0.9 % sodium chloride infusion   Intravenous Continuous Kelsi Andres  mL/hr at 09/05/20 0835      sodium chloride flush 0.9 % injection 10 mL  10 mL Intravenous 2 times per day Caro Paz MD        sodium chloride flush 0.9 % injection 10 mL  10 mL Intravenous PRN Caro Paz MD        acetaminophen (TYLENOL) tablet 650 mg  650 mg Oral Q4H PRN Caro Paz MD   650 mg at 09/03/20 1228    enoxaparin (LOVENOX) injection 30 mg  30 mg Subcutaneous BID Caro Paz MD        buPROPion [facility]) extended release tablet 150 mg  150 mg Oral Daily Caro Paz MD   150 mg at 09/05/20 2653    lurasidone (LATUDA) tablet 40 mg  40 mg Oral Dinner Caro Paz MD   40 mg at 09/04/20 1801    pregabalin (LYRICA) capsule 100 mg  100 mg Oral BID Caro Paz MD   100 mg at 09/05/20 4534    traZODone (DESYREL) tablet 50 mg  50 mg Oral Nightly PRN Caro Paz MD   50 mg at 09/03/20 2051    ketorolac (TORADOL) injection 15 mg  15 mg Intravenous Q6H PRN Caro Paz MD   15 mg at 09/05/20 0533     No Known Allergies  Principal Problem:    Rhabdomyolysis  Active Problems:    Chronic left-sided low back pain without sciatica    Severe episode of recurrent major depressive disorder, without psychotic features ([facility] [address]    Class 1 obesity due to excess calories without serious comorbidity with body mass index (BMI) of 32.0 to 32.9 in adult  Resolved Problems:    * No resolved hospital problems. *    Blood pressure (!) 141/87, pulse 70, temperature 98.2 °F (36.8 °C), temperature source Oral, resp. rate 15, height 6' 1\" (1.854 m), weight 252 lb 10.4 oz (114.6 kg), SpO2 98 %. Subjective:  Symptoms:  Stable. (Still significant myalgias). Diet:  Adequate intake.     Activity level: Impaired due to pain. Pain:  He complains of pain that is moderate. Objective:  General Appearance:  Comfortable. Vital signs: (most recent): Blood pressure (!) 141/87, pulse 70, temperature 98.2 °F (36.8 °C), temperature source Oral, resp. rate 15, height 6' 1\" (1.854 m), weight 252 lb 10.4 oz (114.6 kg), SpO2 98 %.   Vital signs are normal.      Assessment & Plan  Continue aggressive IV hydration per nephrology    Efrain Stout MD  9/5/2020

## 2020-09-05 NOTE — PLAN OF CARE
Problem: Safety:  Goal: Free from accidental physical injury  9/5/2020 1728 by Isaac Holloway RN  Outcome: Ongoing     Problem: Daily Care:  Goal: Daily care needs are met  Outcome: Ongoing     Problem: Pain:  Goal: Patient's pain/discomfort is manageable  9/5/2020 1728 by Isaac Holloway RN  Outcome: Ongoing     Problem: Discharge Planning:  Goal: Patients continuum of care needs are met  Outcome: Ongoing     Problem: Fluid Volume:  Goal: Ability to achieve a balanced intake and output will improve  9/5/2020 1728 by Isaac Holloway RN  Outcome: Ongoing     Problem: Physical Regulation:  Goal: Ability to maintain clinical measurements within normal limits will improve  Outcome: Ongoing     Problem: Tissue Perfusion - Cardiopulmonary, Altered:  Goal: Absence of angina  Outcome: Ongoing     Problem: Mood - Altered:  Goal: Mood stable  9/5/2020 1728 by Isaac Holloway RN  Outcome: Ongoing

## 2020-09-06 LAB
ANION GAP SERPL CALCULATED.3IONS-SCNC: 9 MMOL/L (ref 9–17)
BUN BLDV-MCNC: 6 MG/DL (ref 6–20)
BUN/CREAT BLD: ABNORMAL (ref 9–20)
CALCIUM SERPL-MCNC: 8.7 MG/DL (ref 8.6–10.4)
CHLORIDE BLD-SCNC: 105 MMOL/L (ref 98–107)
CO2: 24 MMOL/L (ref 20–31)
CREAT SERPL-MCNC: 0.69 MG/DL (ref 0.7–1.2)
GFR AFRICAN AMERICAN: >60 ML/MIN
GFR NON-AFRICAN AMERICAN: >60 ML/MIN
GFR SERPL CREATININE-BSD FRML MDRD: ABNORMAL ML/MIN/{1.73_M2}
GFR SERPL CREATININE-BSD FRML MDRD: ABNORMAL ML/MIN/{1.73_M2}
GLUCOSE BLD-MCNC: 96 MG/DL (ref 70–99)
POTASSIUM SERPL-SCNC: 3.9 MMOL/L (ref 3.7–5.3)
SODIUM BLD-SCNC: 138 MMOL/L (ref 135–144)
TOTAL CK: ABNORMAL U/L (ref 39–308)

## 2020-09-06 PROCEDURE — 36415 COLL VENOUS BLD VENIPUNCTURE: CPT

## 2020-09-06 PROCEDURE — 6370000000 HC RX 637 (ALT 250 FOR IP): Performed by: FAMILY MEDICINE

## 2020-09-06 PROCEDURE — 82550 ASSAY OF CK (CPK): CPT

## 2020-09-06 PROCEDURE — 2580000003 HC RX 258: Performed by: INTERNAL MEDICINE

## 2020-09-06 PROCEDURE — 2060000000 HC ICU INTERMEDIATE R&B

## 2020-09-06 PROCEDURE — 80048 BASIC METABOLIC PNL TOTAL CA: CPT

## 2020-09-06 PROCEDURE — 99231 SBSQ HOSP IP/OBS SF/LOW 25: CPT | Performed by: FAMILY MEDICINE

## 2020-09-06 PROCEDURE — 6370000000 HC RX 637 (ALT 250 FOR IP): Performed by: INTERNAL MEDICINE

## 2020-09-06 RX ADMIN — HYDROCODONE BITARTRATE AND ACETAMINOPHEN 2 TABLET: 5; 325 TABLET ORAL at 19:38

## 2020-09-06 RX ADMIN — PREGABALIN 100 MG: 100 CAPSULE ORAL at 09:16

## 2020-09-06 RX ADMIN — METOPROLOL TARTRATE 25 MG: 25 TABLET, FILM COATED ORAL at 19:37

## 2020-09-06 RX ADMIN — SODIUM CHLORIDE: 9 INJECTION, SOLUTION INTRAVENOUS at 12:06

## 2020-09-06 RX ADMIN — BUPROPION HYDROCHLORIDE 150 MG: 150 TABLET, EXTENDED RELEASE ORAL at 09:16

## 2020-09-06 RX ADMIN — LURASIDONE HYDROCHLORIDE 40 MG: 40 TABLET, FILM COATED ORAL at 17:40

## 2020-09-06 RX ADMIN — METOPROLOL TARTRATE 25 MG: 25 TABLET, FILM COATED ORAL at 11:08

## 2020-09-06 RX ADMIN — SODIUM CHLORIDE: 9 INJECTION, SOLUTION INTRAVENOUS at 03:58

## 2020-09-06 RX ADMIN — PREGABALIN 100 MG: 100 CAPSULE ORAL at 19:37

## 2020-09-06 ASSESSMENT — PAIN SCALES - GENERAL
PAINLEVEL_OUTOF10: 9
PAINLEVEL_OUTOF10: 0

## 2020-09-06 ASSESSMENT — PAIN DESCRIPTION - PAIN TYPE
TYPE: ACUTE PAIN
TYPE: ACUTE PAIN

## 2020-09-06 ASSESSMENT — PAIN DESCRIPTION - LOCATION
LOCATION: HEAD
LOCATION: HEAD

## 2020-09-06 NOTE — PROGRESS NOTES
NEPHROLOGY PROGRESS NOTE     Patient :  Liliam Vela; 37 y.o. MRN# [id number]  Location:    Attending:  Roseanna Heart MD  Admit Date:  9/3/2020   Hospital Day: 3      Reason for Consult: Rhabdomyolysis    Interval history: Patient does not have any complaints today and he is nonoliguric. He does not have shortness of breath. History of Present Illness: This is a 37 y.o. male presented to the hospital with severe complains of muscle pain in the arms and in the legs. He had recently started working out and was training with a  and did 2 days of exercise and after that he developed severe amount of pain in his biceps and his upper legs and that is why he came to the hospital patient was also taking normal protein supplements. Patient also noticed that his urine color turned brown. Initial lab work showed total CK 71,000 consistent with rhabdomyolysis  UA showed 2+ protein RBC 2-5 large hemoglobin  Kidney function remained stable and within normal limits serum creatinine 0.6 mg/dL    Objective:  CURRENT TEMPERATURE:  Temp: 97.7 °F (36.5 °C)  MAXIMUM TEMPERATURE OVER 24HRS:  Temp (24hrs), Av.9 °F (36.6 °C), Min:97.7 °F (36.5 °C), Max:98.1 °F (36.7 °C)    CURRENT RESPIRATORY RATE:  Resp: 16  CURRENT PULSE:  Pulse: 74  CURRENT BLOOD PRESSURE:  BP: (!) 147/99  24HR BLOOD PRESSURE RANGE:  Systolic (28JYM), BZ , Min:132 , YULY:534   ; Diastolic (82DQF), RTA:25, Min:92, Max:106    24HR INTAKE/OUTPUT:      Intake/Output Summary (Last 24 hours) at 2020 1023  Last data filed at 2020 0558  Gross per 24 hour   Intake 3535 ml   Output 4200 ml   Net -665 ml     Patient Vitals for the past 96 hrs (Last 3 readings):   Weight   20 0045 246 lb 4.1 oz (111.7 kg)   20 0533 252 lb 10.4 oz (114.6 kg)   20 0345 250 lb 3.6 oz (113.5 kg)     Physical Exam:  GENERAL APPEARANCE: Alert and cooperative, and appears to be in no acute distress.   CARDIAC: Normal S1 and S2. No S3, S4 or murmurs. Rhythm is regular. LUNGS: Clear to auscultation and percussion without rales, rhonchi, wheezing or diminished breath sounds. ABDOMEN: Soft with normal bowel sounds. MUSKULOSKELETAL: Adequately aligned spine. No joint erythema or tenderness. EXTREMITIES: No edema. Peripheral pulses intact. NEURO: No acute focal neurologic deficits. Labs:   CBC:  No results for input(s): WBC, RBC, HGB, HCT, MCV, MCH, MCHC, RDW, PLT, MPV in the last 72 hours. BMP:   Recent Labs     09/04/20  0433 09/05/20  0427 09/06/20  0423    139 138   K 4.2 3.9 3.9   * 108* 105   CO2 22 24 24   BUN 6 6 6   CREATININE 0.60* 0.64* 0.69*   GLUCOSE 99 100* 96   CALCIUM 8.4* 8.5* 8.7      Assessment/plan:    1. Rhabdomyolysis - Nonoliguric with preserved renal function. CPKs trending down and is 21,571 today. Plan: Continue IV fluid 0.9 normal saline at 125 mL/h. Avoid nephrotoxic agents. Monitor urine output closely. Discontinue Toradol and avoid further exposure to nonsteroidal anti-inflammatory drugs. CPK and BMP daily. 2.  Hypertension - may be due to plasma volume expansion. Patient does not have a history of hypertension. Start metoprolol 25 mg p.o. twice daily. Prognosis is guarded.     Electronically signed by Josephine Rushing MD on 9/6/2020 at 10:23 AM

## 2020-09-06 NOTE — PROGRESS NOTES
Donnell Hernandez is a 37 y.o. male patient. Current Facility-Administered Medications   Medication Dose Route Frequency Provider Last Rate Last Dose    HYDROcodone-acetaminophen (NORCO) 5-325 MG per tablet 1 tablet  1 tablet Oral Q6H PRN Matt Holcomb MD        HYDROcodone-acetaminophen [facility]) 5-325 MG per tablet 2 tablet  2 tablet Oral Q6H PRN Mtat Holcomb MD   2 tablet at 09/05/20 2050    0.9 % sodium chloride infusion   Intravenous Continuous Dodie Monte  mL/hr at 09/06/20 0358      sodium chloride flush 0.9 % injection 10 mL  10 mL Intravenous 2 times per day Matt Holcomb MD        sodium chloride flush 0.9 % injection 10 mL  10 mL Intravenous PRN Matt Holcomb MD        acetaminophen (TYLENOL) tablet 650 mg  650 mg Oral Q4H PRN Matt Holcomb MD   650 mg at 09/03/20 1228    enoxaparin (LOVENOX) injection 30 mg  30 mg Subcutaneous BID Matt Holcomb MD        buPROPion [facility]) extended release tablet 150 mg  150 mg Oral Daily Matt Holcomb MD   150 mg at 09/05/20 8953    lurasidone (LATUDA) tablet 40 mg  40 mg Oral Dinner Matt Holcomb MD   40 mg at 09/05/20 1723    pregabalin (LYRICA) capsule 100 mg  100 mg Oral BID Matt Holcomb MD   100 mg at 09/05/20 2041    traZODone (DESYREL) tablet 50 mg  50 mg Oral Nightly PRN Matt Holcomb MD   50 mg at 09/03/20 2051     No Known Allergies  Principal Problem:    Rhabdomyolysis  Active Problems:    Chronic left-sided low back pain without sciatica    Severe episode of recurrent major depressive disorder, without psychotic features ([facility] [address]    Class 1 obesity due to excess calories without serious comorbidity with body mass index (BMI) of 32.0 to 32.9 in adult  Resolved Problems:    * No resolved hospital problems. *    Blood pressure (!) 132/92, pulse 80, temperature 97.7 °F (36.5 °C), temperature source Oral, resp.  rate 16, height 6' 1\" (1.854 m), weight 246 lb 4.1 oz (111.7 kg), SpO2 98 %. Subjective:  Diet:  Adequate intake. Objective:  General Appearance:  Comfortable. Vital signs: (most recent): Blood pressure (!) 132/92, pulse 80, temperature 97.7 °F (36.5 °C), temperature source Oral, resp. rate 16, height 6' 1\" (1.854 m), weight 246 lb 4.1 oz (111.7 kg), SpO2 98 %. Vital signs are normal.      Assessment:    Condition: In stable condition.        Rhabdomyolysis - CK improving, creatinine stable      Bridgett Briggs MD  9/6/2020

## 2020-09-06 NOTE — CARE COORDINATION
ONGOING DISCHARGE PLAN:    Discharge plan for the patient is home with declined VNS. IVF at 125ml/hour.     Total CK continues down trending at 21,251 today.     Will continue to follow for additional discharge needs.     Electronically signed by Fanta Rice RN on 9/6/2020 at 8:35 AM

## 2020-09-06 NOTE — PLAN OF CARE
Problem: Safety:  Goal: Free from accidental physical injury  Description: Free from accidental physical injury  9/6/2020 0432 by Emery Ott RN  Outcome: Met This Shift  Note: Bed locked and in lowest position. Call light and bedside table within reach. Patient calls out appropriately for assistance when needed. Problem: Safety:  Goal: Free from intentional harm  Description: Free from intentional harm  9/6/2020 0432 by Emery tOt RN  Outcome: Met This Shift     Problem: Daily Care:  Goal: Daily care needs are met  Description: Daily care needs are met  9/6/2020 0432 by Emery Ott RN  Outcome: Met This Shift     Problem: Pain:  Goal: Patient's pain/discomfort is manageable  Description: Patient's pain/discomfort is manageable  9/6/2020 0432 by Emery Ott RN  Outcome: Ongoing  Note: Patient given PRN medication as prescribed. Patient is able to reposition self in bed and does so frequently.       Problem: Discharge Planning:  Goal: Patients continuum of care needs are met  Description: Patients continuum of care needs are met  9/6/2020 0432 by Emery Ott RN  Outcome: Ongoing     Problem: Fluid Volume:  Goal: Ability to achieve a balanced intake and output will improve  Description: Ability to achieve a balanced intake and output will improve  9/6/2020 0432 by Emery Ott RN  Outcome: Ongoing     Problem: Physical Regulation:  Goal: Ability to maintain clinical measurements within normal limits will improve  Description: Ability to maintain clinical measurements within normal limits will improve  9/6/2020 0432 by Emery Ott RN  Outcome: Ongoing     Problem: Physical Regulation:  Goal: Will show no signs and symptoms of electrolyte imbalance  Description: Will show no signs and symptoms of electrolyte imbalance  9/6/2020 0432 by Emery Ott RN  Outcome: Ongoing     Problem: Tissue Perfusion - Cardiopulmonary, Altered:  Goal: Absence of angina  Description: Absence of angina  9/6/2020 0432 by Bayron Rice RN  Outcome: Ongoing     Problem: Tissue Perfusion - Cardiopulmonary, Altered:  Goal: Hemodynamic stability will improve  Description: Hemodynamic stability will improve  9/6/2020 0432 by Bayron Rice RN  Outcome: Ongoing     Problem: Mood - Altered:  Goal: Mood stable  Description: Mood stable  9/6/2020 0432 by Bayron Rice RN  Outcome: Ongoing

## 2020-09-07 LAB
ANION GAP SERPL CALCULATED.3IONS-SCNC: 8 MMOL/L (ref 9–17)
BUN BLDV-MCNC: 9 MG/DL (ref 6–20)
BUN/CREAT BLD: ABNORMAL (ref 9–20)
CALCIUM SERPL-MCNC: 8.7 MG/DL (ref 8.6–10.4)
CHLORIDE BLD-SCNC: 105 MMOL/L (ref 98–107)
CO2: 25 MMOL/L (ref 20–31)
CREAT SERPL-MCNC: 0.69 MG/DL (ref 0.7–1.2)
GFR AFRICAN AMERICAN: >60 ML/MIN
GFR NON-AFRICAN AMERICAN: >60 ML/MIN
GFR SERPL CREATININE-BSD FRML MDRD: ABNORMAL ML/MIN/{1.73_M2}
GFR SERPL CREATININE-BSD FRML MDRD: ABNORMAL ML/MIN/{1.73_M2}
GLUCOSE BLD-MCNC: 92 MG/DL (ref 70–99)
POTASSIUM SERPL-SCNC: 3.8 MMOL/L (ref 3.7–5.3)
SODIUM BLD-SCNC: 138 MMOL/L (ref 135–144)
TOTAL CK: ABNORMAL U/L (ref 39–308)

## 2020-09-07 PROCEDURE — 80048 BASIC METABOLIC PNL TOTAL CA: CPT

## 2020-09-07 PROCEDURE — 2580000003 HC RX 258: Performed by: INTERNAL MEDICINE

## 2020-09-07 PROCEDURE — 6370000000 HC RX 637 (ALT 250 FOR IP): Performed by: FAMILY MEDICINE

## 2020-09-07 PROCEDURE — 6360000002 HC RX W HCPCS: Performed by: FAMILY MEDICINE

## 2020-09-07 PROCEDURE — 2060000000 HC ICU INTERMEDIATE R&B

## 2020-09-07 PROCEDURE — 6370000000 HC RX 637 (ALT 250 FOR IP): Performed by: INTERNAL MEDICINE

## 2020-09-07 PROCEDURE — 82550 ASSAY OF CK (CPK): CPT

## 2020-09-07 PROCEDURE — 36415 COLL VENOUS BLD VENIPUNCTURE: CPT

## 2020-09-07 PROCEDURE — 99232 SBSQ HOSP IP/OBS MODERATE 35: CPT | Performed by: FAMILY MEDICINE

## 2020-09-07 RX ORDER — TIZANIDINE 4 MG/1
4 TABLET ORAL EVERY 6 HOURS PRN
Status: DISCONTINUED | OUTPATIENT
Start: 2020-09-07 | End: 2020-09-08 | Stop reason: HOSPADM

## 2020-09-07 RX ORDER — SUMATRIPTAN 50 MG/1
50 TABLET, FILM COATED ORAL 2 TIMES DAILY PRN
Status: DISCONTINUED | OUTPATIENT
Start: 2020-09-07 | End: 2020-09-08 | Stop reason: HOSPADM

## 2020-09-07 RX ORDER — FUROSEMIDE 20 MG/1
20 TABLET ORAL ONCE
Status: COMPLETED | OUTPATIENT
Start: 2020-09-07 | End: 2020-09-07

## 2020-09-07 RX ORDER — SUMATRIPTAN 50 MG/1
50 TABLET, FILM COATED ORAL ONCE
Status: COMPLETED | OUTPATIENT
Start: 2020-09-07 | End: 2020-09-07

## 2020-09-07 RX ADMIN — HYDROCODONE BITARTRATE AND ACETAMINOPHEN 2 TABLET: 5; 325 TABLET ORAL at 01:49

## 2020-09-07 RX ADMIN — FUROSEMIDE 20 MG: 20 TABLET ORAL at 18:27

## 2020-09-07 RX ADMIN — PREGABALIN 100 MG: 100 CAPSULE ORAL at 19:33

## 2020-09-07 RX ADMIN — PREGABALIN 100 MG: 100 CAPSULE ORAL at 08:23

## 2020-09-07 RX ADMIN — SODIUM CHLORIDE: 9 INJECTION, SOLUTION INTRAVENOUS at 01:50

## 2020-09-07 RX ADMIN — SUMATRIPTAN SUCCINATE 50 MG: 50 TABLET ORAL at 20:53

## 2020-09-07 RX ADMIN — SODIUM CHLORIDE: 9 INJECTION, SOLUTION INTRAVENOUS at 08:30

## 2020-09-07 RX ADMIN — HYDROCODONE BITARTRATE AND ACETAMINOPHEN 2 TABLET: 5; 325 TABLET ORAL at 08:23

## 2020-09-07 RX ADMIN — TIZANIDINE 4 MG: 4 TABLET ORAL at 10:32

## 2020-09-07 RX ADMIN — SUMATRIPTAN SUCCINATE 50 MG: 50 TABLET ORAL at 10:32

## 2020-09-07 RX ADMIN — TIZANIDINE 4 MG: 4 TABLET ORAL at 16:19

## 2020-09-07 RX ADMIN — METOPROLOL TARTRATE 25 MG: 25 TABLET, FILM COATED ORAL at 08:23

## 2020-09-07 RX ADMIN — LURASIDONE HYDROCHLORIDE 40 MG: 40 TABLET, FILM COATED ORAL at 17:52

## 2020-09-07 RX ADMIN — METOPROLOL TARTRATE 25 MG: 25 TABLET, FILM COATED ORAL at 19:33

## 2020-09-07 RX ADMIN — BUPROPION HYDROCHLORIDE 150 MG: 150 TABLET, EXTENDED RELEASE ORAL at 08:23

## 2020-09-07 ASSESSMENT — PAIN DESCRIPTION - LOCATION
LOCATION: HEAD
LOCATION: BACK;HEAD;NECK

## 2020-09-07 ASSESSMENT — PAIN SCALES - GENERAL
PAINLEVEL_OUTOF10: 8
PAINLEVEL_OUTOF10: 7
PAINLEVEL_OUTOF10: 0
PAINLEVEL_OUTOF10: 8
PAINLEVEL_OUTOF10: 0
PAINLEVEL_OUTOF10: 8

## 2020-09-07 ASSESSMENT — PAIN DESCRIPTION - PAIN TYPE
TYPE: ACUTE PAIN
TYPE: ACUTE PAIN
TYPE: ACUTE PAIN;CHRONIC PAIN
TYPE: ACUTE PAIN
TYPE: ACUTE PAIN

## 2020-09-07 NOTE — PLAN OF CARE
Problem: Safety:  Goal: Free from accidental physical injury  Description: Free from accidental physical injury  9/7/2020 0049 by Sumi Leroy RN  Outcome: Ongoing  Note: Patient alert and oriented. Up per self with steady gait. 9/6/2020 1632 by Kalli Rosales RN  Outcome: Ongoing  Goal: Free from intentional harm  Description: Free from intentional harm  9/7/2020 0049 by Sumi Leroy RN  Outcome: Ongoing  9/6/2020 1632 by Kalli Rosales RN  Outcome: Ongoing     Problem: Daily Care:  Goal: Daily care needs are met  Description: Daily care needs are met  9/7/2020 0049 by Sumi Leroy RN  Outcome: Ongoing  9/6/2020 1632 by Kalli Rosales RN  Outcome: Ongoing     Problem: Pain:  Goal: Patient's pain/discomfort is manageable  Description: Patient's pain/discomfort is manageable  9/7/2020 0049 by Sumi Leroy RN  Outcome: Ongoing  Note: Patient medicated with norco for complaints of headache pain.    Patient headache relieved with norco.  9/6/2020 1632 by Kalli Rosales RN  Outcome: Ongoing     Problem: Discharge Planning:  Goal: Patients continuum of care needs are met  Description: Patients continuum of care needs are met  9/7/2020 0049 by Sumi Leroy RN  Outcome: Ongoing  9/6/2020 1632 by Kalli Rosales RN  Outcome: Ongoing     Problem: Fluid Volume:  Goal: Ability to achieve a balanced intake and output will improve  Description: Ability to achieve a balanced intake and output will improve  9/7/2020 0049 by Sumi Leroy RN  Outcome: Ongoing  Note: IV fluids continue  Labs continue to be monitored and improving,  9/6/2020 1632 by Kalli Rosales RN  Outcome: Ongoing     Problem: Physical Regulation:  Goal: Ability to maintain clinical measurements within normal limits will improve  Description: Ability to maintain clinical measurements within normal limits will improve  9/7/2020 0049 by Sumi Leroy RN  Outcome: Ongoing  9/6/2020 1632 by Kalli Rosales

## 2020-09-07 NOTE — PLAN OF CARE
Problem: Safety:  Goal: Free from accidental physical injury  Description: Free from accidental physical injury  Outcome: Ongoing     Problem: Daily Care:  Goal: Daily care needs are met  Description: Daily care needs are met  Outcome: Ongoing     Problem: Pain:  Goal: Patient's pain/discomfort is manageable  Description: Patient's pain/discomfort is manageable  Outcome: Ongoing     Problem: Discharge Planning:  Goal: Patients continuum of care needs are met  Description: Patients continuum of care needs are met  Outcome: Ongoing     Problem: Fluid Volume:  Goal: Ability to achieve a balanced intake and output will improve  Description: Ability to achieve a balanced intake and output will improve  Outcome: Ongoing     Problem: Tissue Perfusion - Cardiopulmonary, Altered:  Goal: Absence of angina  Description: Absence of angina  Outcome: Ongoing     Problem: Mood - Altered:  Goal: Mood stable  Description: Mood stable  Outcome: Ongoing

## 2020-09-07 NOTE — CARE COORDINATION
DISCHARGE PLANNING NOTE:    Plan is for this patient to return to home. He is independent and declines any discharge needs. CK 11,826 today - needs to be 5,000 prior to discharge. Will continue to follow along.      Electronically signed by Lanie Joshua RN on 9/7/2020 at 3:21 PM

## 2020-09-07 NOTE — PROGRESS NOTES
Charles Gonzalez is a 37 y.o. male patient.     Current Facility-Administered Medications   Medication Dose Route Frequency Provider Last Rate Last Dose    SUMAtriptan (IMITREX) tablet 50 mg  50 mg Oral Once Halie Mondragon MD        tiZANidine (ZANAFLEX) tablet 4 mg  4 mg Oral Q6H PRN Halie Mondragon MD        metoprolol tartrate (LOPRESSOR) tablet 25 mg  25 mg Oral BID Alexis Saldivar MD   25 mg at 09/07/20 0823    HYDROcodone-acetaminophen (NORCO) 5-325 MG per tablet 1 tablet  1 tablet Oral Q6H PRN Halie Mondragon MD        HYDROcodone-acetaminophen [facility]) 5-325 MG per tablet 2 tablet  2 tablet Oral Q6H PRN Halie Mondragon MD   2 tablet at 09/07/20 0823    0.9 % sodium chloride infusion   Intravenous Continuous Alexis Saldivar  mL/hr at 09/07/20 0830      sodium chloride flush 0.9 % injection 10 mL  10 mL Intravenous 2 times per day Halie Mondragon MD        sodium chloride flush 0.9 % injection 10 mL  10 mL Intravenous PRN Halie Mondragon MD        acetaminophen (TYLENOL) tablet 650 mg  650 mg Oral Q4H PRN Halie Mondragon MD   650 mg at 09/03/20 1228    enoxaparin (LOVENOX) injection 30 mg  30 mg Subcutaneous BID Halie Mondragon MD        buPROPion [facility]) extended release tablet 150 mg  150 mg Oral Daily Halie Mondragon MD   150 mg at 09/07/20 8079    lurasidone (LATUDA) tablet 40 mg  40 mg Oral Dinner Halie Mondragon MD   40 mg at 09/06/20 1740    pregabalin (LYRICA) capsule 100 mg  100 mg Oral BID Halie Mondragon MD   100 mg at 09/07/20 7863    traZODone (DESYREL) tablet 50 mg  50 mg Oral Nightly PRN Halie Mondragon MD   50 mg at 09/03/20 2051     No Known Allergies  Principal Problem:    Rhabdomyolysis  Active Problems:    Chronic left-sided low back pain without sciatica    Severe episode of recurrent major depressive disorder, without psychotic features ([address]    Class 1 obesity due to excess calories without serious comorbidity with body mass index (BMI) of 32.0 to 32.9 in adult  Resolved Problems:    * No resolved hospital problems. *    Blood pressure (!) 146/102, pulse 65, temperature 98.3 °F (36.8 °C), temperature source Oral, resp. rate 18, height 6' 1\" (1.854 m), weight 242 lb 15.2 oz (110.2 kg), SpO2 95 %. Subjective:  Symptoms:  Stable. Diet:  Adequate intake. Activity level: Returning to normal.    Pain:  He complains of pain that is moderate. (Right neck spasm and resulting headache). Objective:  General Appearance:  Comfortable. Vital signs: (most recent): Blood pressure (!) 146/102, pulse 65, temperature 98.3 °F (36.8 °C), temperature source Oral, resp. rate 18, height 6' 1\" (1.854 m), weight 242 lb 15.2 oz (110.2 kg), SpO2 95 %. (BP elevated, may be due to pain).     Extremities: (2+ edema upper extremities)    Assessment & Plan  Rhabdomyolysis - CK improving    Neck spasm - add tizanidine    Headache - sumatriptan  Bridgett Briggs MD  9/7/2020

## 2020-09-07 NOTE — PROGRESS NOTES
NEPHROLOGY PROGRESS NOTE     Patient :  Liliam Vela; 37 y.o. MRN# [id number]  Location:    Attending:  Roseanna Heart MD  Admit Date:  9/3/2020   Hospital Day: 4      Reason for Consult: Rhabdomyolysis    Interval history:   Patient does not have any complaints today and he is nonoliguric. He does not have shortness of breath. CK trending down to 11,000  Kidney fx remains with in normal limits. History of Present Illness: This is a 37 y.o. male presented to the hospital with severe complains of muscle pain in the arms and in the legs. He had recently started working out and was training with a  and did 2 days of exercise and after that he developed severe amount of pain in his biceps and his upper legs and that is why he came to the hospital patient was also taking normal protein supplements. Patient also noticed that his urine color turned brown.   Initial lab work showed total CK 71,000 consistent with rhabdomyolysis  UA showed 2+ protein RBC 2-5 large hemoglobin  Kidney function remained stable and within normal limits serum creatinine 0.6 mg/dL    Objective:  CURRENT TEMPERATURE:  Temp: 98.2 °F (36.8 °C)  MAXIMUM TEMPERATURE OVER 24HRS:  Temp (24hrs), Av.3 °F (36.8 °C), Min:97.9 °F (36.6 °C), Max:98.6 °F (37 °C)    CURRENT RESPIRATORY RATE:  Resp: 18  CURRENT PULSE:  Pulse: 71  CURRENT BLOOD PRESSURE:  BP: (!) 150/90  24HR BLOOD PRESSURE RANGE:  Systolic (80VBJ), FFK:839 , Min:125 , IVZ:105   ; Diastolic (55WEQ), GNR:49, Min:67, Max:102    24HR INTAKE/OUTPUT:      Intake/Output Summary (Last 24 hours) at 2020 1803  Last data filed at 2020 1752  Gross per 24 hour   Intake 2460 ml   Output 3200 ml   Net -740 ml     Patient Vitals for the past 96 hrs (Last 3 readings):   Weight   20 0224 242 lb 15.2 oz (110.2 kg)   20 0045 246 lb 4.1 oz (111.7 kg)   20 0533 252 lb 10.4 oz (114.6 kg)     Physical Exam:  GENERAL APPEARANCE: Alert and cooperative, and appears to be in no acute distress. CARDIAC: Normal S1 and S2. No S3, S4 or murmurs. Rhythm is regular. LUNGS: Clear to auscultation and percussion without rales, rhonchi, wheezing or diminished breath sounds. ABDOMEN: Soft with normal bowel sounds. MUSKULOSKELETAL: Adequately aligned spine. No joint erythema or tenderness. EXTREMITIES: + edema. Peripheral pulses intact. NEURO: No acute focal neurologic deficits. Labs:   CBC:  No results for input(s): WBC, RBC, HGB, HCT, MCV, MCH, MCHC, RDW, PLT, MPV in the last 72 hours. BMP:   Recent Labs     09/05/20  0427 09/06/20  0423 09/07/20  0457    138 138   K 3.9 3.9 3.8   * 105 105   CO2 24 24 25   BUN 6 6 9   CREATININE 0.64* 0.69* 0.69*   GLUCOSE 100* 96 92   CALCIUM 8.5* 8.7 8.7      Assessment/plan:    1. Rhabdomyolysis - Nonoliguric with preserved renal function. CPKs trending down and is 11,000 today. Plan: Continue IV fluid 0.9 normal saline at 100 mL/h. Avoid nephrotoxic agents. Monitor urine output closely. Discontinue Toradol and avoid further exposure to nonsteroidal anti-inflammatory drugs. CPK and BMP daily. 2.  Hypertension - may be due to plasma volume expansion. Patient does not have a history of hypertension. continue metoprolol 25 mg p.o. twice daily. 3. Peripheral edema   Taper IVF to 100 ml/hr  1 dose of lasix 20 mg po      Prognosis is guarded.     Electronically signed by [facility], MD on 9/7/2020 at 6:03 PM

## 2020-09-08 VITALS
RESPIRATION RATE: 16 BRPM | HEIGHT: 73 IN | TEMPERATURE: 98.1 F | HEART RATE: 60 BPM | OXYGEN SATURATION: 94 % | BODY MASS INDEX: 31.85 KG/M2 | SYSTOLIC BLOOD PRESSURE: 136 MMHG | DIASTOLIC BLOOD PRESSURE: 87 MMHG | WEIGHT: 240.3 LBS

## 2020-09-08 LAB
ANION GAP SERPL CALCULATED.3IONS-SCNC: 10 MMOL/L (ref 9–17)
ANION GAP SERPL CALCULATED.3IONS-SCNC: 9 MMOL/L (ref 9–17)
BUN BLDV-MCNC: 9 MG/DL (ref 6–20)
BUN BLDV-MCNC: 9 MG/DL (ref 6–20)
BUN/CREAT BLD: ABNORMAL (ref 9–20)
BUN/CREAT BLD: ABNORMAL (ref 9–20)
CALCIUM SERPL-MCNC: 8.9 MG/DL (ref 8.6–10.4)
CALCIUM SERPL-MCNC: 9 MG/DL (ref 8.6–10.4)
CHLORIDE BLD-SCNC: 102 MMOL/L (ref 98–107)
CHLORIDE BLD-SCNC: 104 MMOL/L (ref 98–107)
CO2: 26 MMOL/L (ref 20–31)
CO2: 26 MMOL/L (ref 20–31)
CREAT SERPL-MCNC: 0.64 MG/DL (ref 0.7–1.2)
CREAT SERPL-MCNC: 0.72 MG/DL (ref 0.7–1.2)
GFR AFRICAN AMERICAN: >60 ML/MIN
GFR AFRICAN AMERICAN: >60 ML/MIN
GFR NON-AFRICAN AMERICAN: >60 ML/MIN
GFR NON-AFRICAN AMERICAN: >60 ML/MIN
GFR SERPL CREATININE-BSD FRML MDRD: ABNORMAL ML/MIN/{1.73_M2}
GLUCOSE BLD-MCNC: 115 MG/DL (ref 70–99)
GLUCOSE BLD-MCNC: 95 MG/DL (ref 70–99)
POTASSIUM SERPL-SCNC: 3.5 MMOL/L (ref 3.7–5.3)
POTASSIUM SERPL-SCNC: 4.2 MMOL/L (ref 3.7–5.3)
SODIUM BLD-SCNC: 138 MMOL/L (ref 135–144)
SODIUM BLD-SCNC: 139 MMOL/L (ref 135–144)
TOTAL CK: 5437 U/L (ref 39–308)
TOTAL CK: 6052 U/L (ref 39–308)

## 2020-09-08 PROCEDURE — 2580000003 HC RX 258: Performed by: FAMILY MEDICINE

## 2020-09-08 PROCEDURE — 2580000003 HC RX 258: Performed by: INTERNAL MEDICINE

## 2020-09-08 PROCEDURE — 6370000000 HC RX 637 (ALT 250 FOR IP): Performed by: INTERNAL MEDICINE

## 2020-09-08 PROCEDURE — 6370000000 HC RX 637 (ALT 250 FOR IP): Performed by: FAMILY MEDICINE

## 2020-09-08 PROCEDURE — 99231 SBSQ HOSP IP/OBS SF/LOW 25: CPT | Performed by: FAMILY MEDICINE

## 2020-09-08 PROCEDURE — 36415 COLL VENOUS BLD VENIPUNCTURE: CPT

## 2020-09-08 PROCEDURE — 82550 ASSAY OF CK (CPK): CPT

## 2020-09-08 PROCEDURE — 80048 BASIC METABOLIC PNL TOTAL CA: CPT

## 2020-09-08 RX ORDER — TIZANIDINE 4 MG/1
4 TABLET ORAL EVERY 6 HOURS PRN
Qty: 20 TABLET | Refills: 0 | Status: SHIPPED | OUTPATIENT
Start: 2020-09-08 | End: 2020-12-14 | Stop reason: ALTCHOICE

## 2020-09-08 RX ORDER — HYDROCODONE BITARTRATE AND ACETAMINOPHEN 5; 325 MG/1; MG/1
1 TABLET ORAL EVERY 6 HOURS PRN
Qty: 20 TABLET | Refills: 0 | Status: SHIPPED | OUTPATIENT
Start: 2020-09-08 | End: 2020-09-15 | Stop reason: ALTCHOICE

## 2020-09-08 RX ORDER — POTASSIUM CHLORIDE 20 MEQ/1
40 TABLET, EXTENDED RELEASE ORAL 2 TIMES DAILY WITH MEALS
Status: DISCONTINUED | OUTPATIENT
Start: 2020-09-08 | End: 2020-09-08

## 2020-09-08 RX ADMIN — PREGABALIN 100 MG: 100 CAPSULE ORAL at 08:38

## 2020-09-08 RX ADMIN — LURASIDONE HYDROCHLORIDE 40 MG: 40 TABLET, FILM COATED ORAL at 17:26

## 2020-09-08 RX ADMIN — SUMATRIPTAN SUCCINATE 50 MG: 50 TABLET ORAL at 17:26

## 2020-09-08 RX ADMIN — METOPROLOL TARTRATE 25 MG: 25 TABLET, FILM COATED ORAL at 08:38

## 2020-09-08 RX ADMIN — BUPROPION HYDROCHLORIDE 150 MG: 150 TABLET, EXTENDED RELEASE ORAL at 08:37

## 2020-09-08 RX ADMIN — Medication 10 ML: at 08:38

## 2020-09-08 RX ADMIN — POTASSIUM CHLORIDE 40 MEQ: 1500 TABLET, EXTENDED RELEASE ORAL at 10:15

## 2020-09-08 RX ADMIN — SODIUM CHLORIDE: 9 INJECTION, SOLUTION INTRAVENOUS at 10:15

## 2020-09-08 ASSESSMENT — PAIN DESCRIPTION - PAIN TYPE: TYPE: ACUTE PAIN

## 2020-09-08 ASSESSMENT — PAIN SCALES - GENERAL: PAINLEVEL_OUTOF10: 6

## 2020-09-08 ASSESSMENT — PAIN DESCRIPTION - LOCATION: LOCATION: HEAD

## 2020-09-08 NOTE — CARE COORDINATION
ONGOING DISCHARGE PLAN:    Reviewed patients chart regarding discharge plan and chart still confirms the plan is to discharge to home with no needs   Patient denies need for vns or dme  CK is trending down   On iv fluids     Will review plan with patient and or family      Will continue to follow for additional discharge needs.      Electronically signed by Rosio Cantu RN on 9/8/2020 at 2:19 PM

## 2020-09-08 NOTE — PROGRESS NOTES
Patient discharged home. Writer removed IV and gave patient his medications back. Discharge paperwork gone over including new meds, all questions answered at this time.

## 2020-09-08 NOTE — PROGRESS NOTES
This RN called and spoke with Dr. Wei Schaeffer regarding patient's headache. Received orders for Imitrex PRN. See MAR for further dosage and administration specifications.

## 2020-09-08 NOTE — PLAN OF CARE
Problem: Safety:  Goal: Free from accidental physical injury  Description: Free from accidental physical injury  9/8/2020 0550 by Adán Dixon RN  Outcome: Met This Shift  Note: Patient is up per self. Bed  locked and in lowest position. Call light within reach. Bedside table within reach. Patient uses call light appropriately for assistance when needed. Problem: Safety:  Goal: Free from intentional harm  Description: Free from intentional harm  Outcome: Met This Shift     Problem: Daily Care:  Goal: Daily care needs are met  Description: Daily care needs are met  9/8/2020 0550 by Adán Dixon RN  Outcome: Met This Shift     Problem: Pain:  Goal: Patient's pain/discomfort is manageable  Description: Patient's pain/discomfort is manageable  9/8/2020 0550 by Adán Dixon RN  Outcome: Ongoing  Note: Patient given PRN pain medication as prescribed. Patient is able to reposition self in bed and does so frequently. Lights dimmed and door closed to decrease sound as patient had migraine headache.       Problem: Pain:  Goal: Pain level will decrease  Description: Pain level will decrease  Outcome: Ongoing     Problem: Pain:  Goal: Control of acute pain  Description: Control of acute pain  Outcome: Ongoing     Problem: Pain:  Goal: Control of chronic pain  Description: Control of chronic pain  Outcome: Ongoing     Problem: Discharge Planning:  Goal: Patients continuum of care needs are met  Description: Patients continuum of care needs are met  9/8/2020 0550 by Adán Dixon RN  Outcome: Ongoing     Problem: Fluid Volume:  Goal: Ability to achieve a balanced intake and output will improve  Description: Ability to achieve a balanced intake and output will improve  9/8/2020 0550 by Adán Dixon RN  Outcome: Ongoing     Problem: Physical Regulation:  Goal: Ability to maintain clinical measurements within normal limits will improve  Description: Ability to maintain clinical measurements within normal limits

## 2020-09-08 NOTE — PROGRESS NOTES
Himanshu Calderon is a 37 y.o. male patient.     Current Facility-Administered Medications   Medication Dose Route Frequency Provider Last Rate Last Dose    tiZANidine (ZANAFLEX) tablet 4 mg  4 mg Oral Q6H PRN Mirtha Penn MD   4 mg at 09/07/20 1619    SUMAtriptan (IMITREX) tablet 50 mg  50 mg Oral BID PRN Mirtha Penn MD   50 mg at 09/07/20 2053    metoprolol tartrate (LOPRESSOR) tablet 25 mg  25 mg Oral BID Lacey Fowler MD   25 mg at 09/07/20 1933    HYDROcodone-acetaminophen (NORCO) 5-325 MG per tablet 1 tablet  1 tablet Oral Q6H PRN Mirtha Penn MD        HYDROcodone-acetaminophen [facility]) 5-325 MG per tablet 2 tablet  2 tablet Oral Q6H PRN Mirtha Penn MD   2 tablet at 09/07/20 0823    0.9 % sodium chloride infusion   Intravenous Continuous Taras Solis  mL/hr at 09/07/20 1737      sodium chloride flush 0.9 % injection 10 mL  10 mL Intravenous 2 times per day Mirtha Penn MD        sodium chloride flush 0.9 % injection 10 mL  10 mL Intravenous PRN Mirtah Penn MD        acetaminophen (TYLENOL) tablet 650 mg  650 mg Oral Q4H PRN Mirtha Penn MD   650 mg at 09/03/20 1228    enoxaparin (LOVENOX) injection 30 mg  30 mg Subcutaneous BID Mirtha Penn MD        buPROPion [facility]) extended release tablet 150 mg  150 mg Oral Daily Mirtha Penn MD   150 mg at 09/07/20 1142    lurasidone (LATUDA) tablet 40 mg  40 mg Oral Dinner Mirtha Penn MD   40 mg at 09/07/20 1752    pregabalin (LYRICA) capsule 100 mg  100 mg Oral BID Mirtha Penn MD   100 mg at 09/07/20 1933    traZODone (DESYREL) tablet 50 mg  50 mg Oral Nightly PRN Mirtha Penn MD   50 mg at 09/03/20 2051     No Known Allergies  Principal Problem:    Rhabdomyolysis  Active Problems:    Chronic left-sided low back pain without sciatica    Severe episode of recurrent major depressive disorder, without psychotic features ([facility] [address] Class 1 obesity due to excess calories without serious comorbidity with body mass index (BMI) of 32.0 to 32.9 in adult  Resolved Problems:    * No resolved hospital problems. *    Blood pressure (!) 130/90, pulse 54, temperature 98.4 °F (36.9 °C), temperature source Oral, resp. rate 16, height 6' 1\" (1.854 m), weight 240 lb 4.8 oz (109 kg), SpO2 98 %. Subjective:  Symptoms:  Stable. Diet:  Adequate intake. Activity level: Returning to normal.    Pain:  He complains of pain that is moderate. Objective:  General Appearance:  Comfortable. Vital signs: (most recent): Blood pressure (!) 130/90, pulse 54, temperature 98.4 °F (36.9 °C), temperature source Oral, resp. rate 16, height 6' 1\" (1.854 m), weight 240 lb 4.8 oz (109 kg), SpO2 98 %.   Vital signs are normal.      Assessment & Plan  Continue IVF per nephrology    D/c planning  Roseanna Heart MD  9/8/2020

## 2020-09-08 NOTE — PROGRESS NOTES
Writer called lab and asked if there was anyway that the CK result could be resulted,  told writer that there would be a delay due to maintenance being done on the tool used. Writer relayed message to patient.

## 2020-09-08 NOTE — PROGRESS NOTES
Writer spoke to Dr Aimee Latif regarding repeat CK result of 5,437. He stated that he was okay with the patient discharging, will call Dr. Morris Workman confirm.

## 2020-09-08 NOTE — PROGRESS NOTES
NEPHROLOGY PROGRESS NOTE     Patient :  Rodney Maldonado; 37 y.o. MRN# [id number]  Location:    Attending:  Macey Poole MD  Admit Date:  9/3/2020   Hospital Day: 5      Reason for Consult: Rhabdomyolysis    Interval history:   Patient does not have any complaints today and he is nonoliguric. He does not have shortness of breath. CK trending down to 6500  Kidney fx remains with in normal limits. History of Present Illness: This is a 37 y.o. male presented to the hospital with severe complains of muscle pain in the arms and in the legs. He had recently started working out and was training with a  and did 2 days of exercise and after that he developed severe amount of pain in his biceps and his upper legs and that is why he came to the hospital patient was also taking normal protein supplements. Patient also noticed that his urine color turned brown.   Initial lab work showed total CK 71,000 consistent with rhabdomyolysis  UA showed 2+ protein RBC 2-5 large hemoglobin  Kidney function remained stable and within normal limits serum creatinine 0.6 mg/dL    Objective:  CURRENT TEMPERATURE:  Temp: 98.1 °F (36.7 °C)  MAXIMUM TEMPERATURE OVER 24HRS:  Temp (24hrs), Av.1 °F (36.7 °C), Min:97.9 °F (36.6 °C), Max:98.4 °F (36.9 °C)    CURRENT RESPIRATORY RATE:  Resp: 16  CURRENT PULSE:  Pulse: 60  CURRENT BLOOD PRESSURE:  BP: 136/87  24HR BLOOD PRESSURE RANGE:  Systolic (79QAP), LBI:561 , Min:130 , WLO:596   ; Diastolic (09ZCD), MUD:74, Min:80, Max:90    24HR INTAKE/OUTPUT:      Intake/Output Summary (Last 24 hours) at 2020 1610  Last data filed at 2020 0844  Gross per 24 hour   Intake 3423 ml   Output 5050 ml   Net -1627 ml     Patient Vitals for the past 96 hrs (Last 3 readings):   Weight   20 0419 240 lb 4.8 oz (109 kg)   20 0224 242 lb 15.2 oz (110.2 kg)   20 0045 246 lb 4.1 oz (111.7 kg)     Physical Exam:  GENERAL APPEARANCE: Alert and cooperative, and appears to be in no acute distress. CARDIAC: Normal S1 and S2. No S3, S4 or murmurs. Rhythm is regular. LUNGS: Clear to auscultation and percussion without rales, rhonchi, wheezing or diminished breath sounds. ABDOMEN: Soft with normal bowel sounds. MUSKULOSKELETAL: Adequately aligned spine. No joint erythema or tenderness. EXTREMITIES: + edema. Peripheral pulses intact. NEURO: No acute focal neurologic deficits. Labs:   CBC:  No results for input(s): WBC, RBC, HGB, HCT, MCV, MCH, MCHC, RDW, PLT, MPV in the last 72 hours. BMP:   Recent Labs     09/06/20  0423 09/07/20  0457 09/08/20  0540    138 139   K 3.9 3.8 3.5*    105 104   CO2 24 25 26   BUN 6 9 9   CREATININE 0.69* 0.69* 0.72   GLUCOSE 96 92 115*   CALCIUM 8.7 8.7 8.9      Assessment/plan:    1. Rhabdomyolysis - Nonoliguric with preserved renal function. CPKs trending down and is 6,000 today. 2.  Hypertension - may be due to plasma volume expansion. Patient does not have a history of hypertension. continue metoprolol 25 mg p.o. twice daily. 3. Peripheral edema     Plan:    Once CK level is less than 5000 no objection on discharge from nephrology standpoint          Prognosis is guarded.     Electronically signed by Armond Herron MD on 9/8/2020 at 4:10 PM

## 2020-09-16 NOTE — DISCHARGE SUMMARY
Family Medicine Discharge Summary    Farhan Bergeron  :  1985  MRN:  [id number]    Admit date:  9/3/2020  Discharge date:  2020    Admitting Physician:  Sixto Nyhan, MD    Discharge Diagnoses:    Patient Active Problem List   Diagnosis    Chronic left-sided low back pain without sciatica    Migraine without status migrainosus, not intractable    Severe episode of recurrent major depressive disorder, without psychotic features [facility]    Overweight (BMI 25.0-29. 9)    Class 1 obesity due to excess calories without serious comorbidity with body mass index (BMI) of 32.0 to 32.9 in adult    SVT (supraventricular tachycardia) (HCC)    Severe major depression (HCC)    Rhabdomyolysis       Admission Condition:  guarded  Discharged Condition:  good    Hospital Course:   36 yo presented to [facility] with muscle aches after initiating workouts with a . He was found to be in rhabdomyolysis, and treated with IV fluids without worsening of his renal function. Discharge Medications:       Mendel Mile   Home Medication Instructions FE    Printed on:20 2158   Medication Information                      fluconazole (DIFLUCAN) 200 MG tablet  Take 200 mg once, exercise for 1 hour and don't shower for at least 8 hours. Repeat in 1 week             ketoconazole (NIZORAL) 2 % shampoo  Use as body wash leaving on for 5 minutes prior to washing off once daily for 2 weeks then three times weekly             lurasidone (LATUDA) 40 MG TABS tablet  Take 1 tablet by mouth Daily with supper             metoprolol tartrate (LOPRESSOR) 25 MG tablet  Take 1 tablet by mouth 2 times daily             pregabalin (LYRICA) 25 MG capsule  Take 100 mg by mouth 2 times daily.              SUMAtriptan (IMITREX) 100 MG tablet  TAKE 1 TABLET BY MOUTH ONCE AS NEEDED FOR MIGRAINE             tiZANidine (ZANAFLEX) 4 MG tablet  Take 1 tablet by mouth every 6 hours as needed (spasm)             traZODone (DESYREL) 50 MG tablet  Take 1 tablet by mouth nightly as needed for Sleep                 Consults:  nephrology    Significant Diagnostic Studies:  labs    Treatments:   IV hydration    Disposition:   home  Follow up with Sarah Quesada MD in 1 week    Signed:   Haley Freeman MD, FAAFP  9/16/2020, 4:37 PM

## 2020-11-05 ENCOUNTER — OFFICE VISIT (OUTPATIENT)
Dept: NEUROSURGERY | Age: 35
End: 2020-11-05
Payer: COMMERCIAL

## 2020-11-05 VITALS
SYSTOLIC BLOOD PRESSURE: 191 MMHG | BODY MASS INDEX: 32.47 KG/M2 | WEIGHT: 245 LBS | TEMPERATURE: 97.1 F | HEART RATE: 73 BPM | HEIGHT: 73 IN | DIASTOLIC BLOOD PRESSURE: 125 MMHG

## 2020-11-05 PROCEDURE — 99203 OFFICE O/P NEW LOW 30 MIN: CPT | Performed by: NEUROLOGICAL SURGERY

## 2020-11-05 PROCEDURE — G8484 FLU IMMUNIZE NO ADMIN: HCPCS | Performed by: NEUROLOGICAL SURGERY

## 2020-11-05 PROCEDURE — G8427 DOCREV CUR MEDS BY ELIG CLIN: HCPCS | Performed by: NEUROLOGICAL SURGERY

## 2020-11-05 PROCEDURE — G8417 CALC BMI ABV UP PARAM F/U: HCPCS | Performed by: NEUROLOGICAL SURGERY

## 2020-11-05 PROCEDURE — 1036F TOBACCO NON-USER: CPT | Performed by: NEUROLOGICAL SURGERY

## 2020-11-05 NOTE — PROGRESS NOTES
Relationships    Social connections     Talks on phone: Not on file     Gets together: Not on file     Attends [employer] service: Not on file     Active member of club or organization: Not on file     Attends meetings of clubs or organizations: Not on file     Relationship status: Not on file    Intimate partner violence     Fear of current or ex partner: Not on file     Emotionally abused: Not on file     Physically abused: Not on file     Forced sexual activity: Not on file   Other Topics Concern    Not on file   Social History Narrative    Not on file       Family History:   No family history on file. Allergies:  Patient has no known allergies. Home Medications:  Prior to Admission medications    Medication Sig Start Date End Date Taking? Authorizing Provider   traZODone (DESYREL) 50 MG tablet Take 1 tablet by mouth nightly as needed for Sleep 9/24/20  Yes Jeffery Jeong MD   lurasidone (LATUDA) 40 MG TABS tablet Take 1 tablet by mouth Daily with supper 9/24/20  Yes Jeffery Jeong MD   buPROPion (WELLBUTRIN XL) 150 MG extended release tablet  7/25/19  Yes Historical Provider, MD   tiZANidine (ZANAFLEX) 4 MG tablet Take 1 tablet by mouth every 6 hours as needed (spasm) 9/8/20  Yes Jeffery Jeong MD   ketoconazole (NIZORAL) 2 % shampoo Use as body wash leaving on for 5 minutes prior to washing off once daily for 2 weeks then three times weekly 8/17/20  Yes Jesus Manuel Guzman MD   fluconazole (DIFLUCAN) 200 MG tablet Take 200 mg once, exercise for 1 hour and don't shower for at least 8 hours. Repeat in 1 week 8/17/20  Yes Jesus Manuel Guzman MD   pregabalin (LYRICA) 25 MG capsule Take 100 mg by mouth 2 times daily.    Yes Historical Provider, MD   metoprolol tartrate (LOPRESSOR) 25 MG tablet Take 1 tablet by mouth 2 times daily  Patient not taking: Reported on 11/5/2020 9/8/20   Jeffery Jeong MD   SUMAtriptan (IMITREX) 100 MG tablet TAKE 1 TABLET BY MOUTH ONCE AS NEEDED FOR MIGRAINE foraminal stenosis    ASSESSMENT AND PLAN:       Patient Active Problem List   Diagnosis    Chronic left-sided low back pain without sciatica    Migraine without status migrainosus, not intractable    Severe episode of recurrent major depressive disorder, without psychotic features ([facility]    Overweight (BMI 25.0-29. 9)    Class 1 obesity due to excess calories without serious comorbidity with body mass index (BMI) of 32.0 to 32.9 in adult    SVT (supraventricular tachycardia) (HCC)    Severe major depression (HCC)    Rhabdomyolysis         A/P:  This is a 37 y.o. male with axial neck pain. He has no significant disk degeneration.  He has a some left c5/6 foraminal stenosis by no pain on left arm.  recommend f/u with his pain management physician  Recommend PT and neck exercise   Education given about need to maintain normal level of activity and limit too much recumbancy and encourage moderate exercise   patient the benign natural history the problem    I do not see any indication for surgery at this time  F/u in 6 weeks     Lorna Cordoba DO     11/5/2020  10:52 AM    Don Wise DO  Staff Neurosurgeon

## 2020-11-23 ENCOUNTER — HOSPITAL ENCOUNTER (OUTPATIENT)
Dept: PHYSICAL THERAPY | Age: 35
Setting detail: THERAPIES SERIES
Discharge: HOME | End: 2020-11-23
Payer: COMMERCIAL

## 2020-11-23 PROCEDURE — 97162 PT EVAL MOD COMPLEX 30 MIN: CPT

## 2020-11-23 PROCEDURE — 97110 THERAPEUTIC EXERCISES: CPT
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
## 2020-11-24 PROBLEM — I10 ESSENTIAL HYPERTENSION: Status: ACTIVE | Noted: 2020-11-24

## 2020-11-24 PROBLEM — R74.8 ELEVATED CK: Status: ACTIVE | Noted: 2020-11-24

## 2020-11-24 PROBLEM — N18.1 CKD (CHRONIC KIDNEY DISEASE) STAGE 1, GFR 90 ML/MIN OR GREATER: Status: ACTIVE | Noted: 2020-11-24

## 2020-12-01 ENCOUNTER — HOSPITAL ENCOUNTER (OUTPATIENT)
Dept: PHYSICAL THERAPY | Age: 35
Setting detail: THERAPIES SERIES
Discharge: HOME | End: 2020-12-01
Payer: COMMERCIAL

## 2020-12-01 PROCEDURE — 97110 THERAPEUTIC EXERCISES: CPT

## 2020-12-01 PROCEDURE — 97140 MANUAL THERAPY 1/> REGIONS: CPT

## 2020-12-01 NOTE — CONSULTS
[address] [facility]   [address] [facility] [address] [facility] [address]   Phone: [phone]   Fax: [phone]    Physical Therapy Daily Treatment Note      Date:  2020  Patient Name:  Josette Clancy    :  1985  MRN: [id number]  Physician: Lorice Rinne, DO     Insurance: [place], 27 visits  Diagnosis: Neck pain (chronic)   Onset Date:    Next Dr. Jax Martinezar: 20  Visit# / total visits:   Cancels/No Shows: 0    Subjective:    Pain:  [x] Yes  [] No Location: Midline cervical, upper thoracic and R trap region  Pain Rating: (0-10 scale) 310   Pain altered Tx:  [x] No  [] Yes  Action:  Comments: : Pt states that his neck has been feeling a little bit better and has been trying to stretch more. Reports elevated pain in lower back, knees and shoulders today due to the weather but overall slightly reduced pain and tightness in neck and upper back. Objective:  Modalities:   Precautions: None  Exercises:  Exercise Reps/ Time Weight/ Level Comments Performed=x   Sidelying book openers 10 reps x 2  R/L each x   Cat/cows 15 reps   x   Quadruped rock backs 10 reps   x   Standing active assistive shoulder elevation w/dowel 15 reps   x          Other: Manual therapy x 15 min  -STM/release to B cervical paraspinals, B suboccipitals and R upper trap/levator scap  -Grade II/III mobilizations (PA and facet glides) at C3-C7  -Grade III/IV PA mobilizations to upper/mid thoracic spine T1-T8    Specific Instructions for next treatment: Progress cervicothoracic stabilization as tolerated      Assessment: [x] Progressing toward goals. [] No change. [] Other:    [] Patient would continue to benefit from skilled physical therapy services in order to: manage pain and improve strength/stability of cervicothoracic spine    : Initiated manual therapy to improve joint & soft tissue mobility and upper quarter with moderate discomfort but overall good tolerance.  Pt's thoracic spine flexes

## 2020-12-03 ENCOUNTER — HOSPITAL ENCOUNTER (OUTPATIENT)
Dept: PHYSICAL THERAPY | Age: 35
Setting detail: THERAPIES SERIES
Discharge: HOME | End: 2020-12-03
Payer: COMMERCIAL

## 2020-12-03 PROCEDURE — 97110 THERAPEUTIC EXERCISES: CPT

## 2020-12-03 PROCEDURE — 97140 MANUAL THERAPY 1/> REGIONS: CPT
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
## 2020-12-08 ENCOUNTER — HOSPITAL ENCOUNTER (OUTPATIENT)
Dept: PHYSICAL THERAPY | Age: 35
Setting detail: THERAPIES SERIES
Discharge: HOME | End: 2020-12-08
Payer: COMMERCIAL

## 2020-12-08 PROCEDURE — 97140 MANUAL THERAPY 1/> REGIONS: CPT

## 2020-12-08 PROCEDURE — 97110 THERAPEUTIC EXERCISES: CPT

## 2020-12-08 NOTE — FLOWSHEET NOTE
[facility]   [address]   Phone: [phone]   Fax: [phone]    Physical Therapy Daily Treatment Note      Date:  2020  Patient Name:  Rosy Lawson    :  1985  MRN: [id number]  Physician: Tito Garrison DO     Insurance: [place], 27 visits  Diagnosis: Neck pain (chronic)   Onset Date:    Next Dr. Christina Theodore: 20  Visit# / total visits:   Cancels/No Shows: 0    Subjective:    Pain:  [x] Yes  [] No Location: Midline cervical, upper thoracic and R trap region  Pain Rating: (0-10 scale) 3/10   Pain altered Tx:  [x] No  [] Yes  Action:  Comments: : Pt states that he had a rough night sleeping last night and has a tough time getting comfortable. Had a bad migraine this past Saturday.     Objective:  Modalities:   Precautions: None  Exercises:  Exercise Reps/ Time Weight/ Level Comments Performed=x   Sidelying book openers 10 reps x 2  R/L each    Cat/cows 15 reps      Quadruped rock backs 10 reps      Standing active assistive shoulder elevation w/dowel 15 reps      Supine thoracic extension over foam roller 10 reps x 2   x   Seated thoracic extension rotation w/hands behind head   Elbows on mat, holding dowel behind shoulders Pt sitting on stool scooting away from table    Banded bilateral shoulder horizontal abduction 15 reps x 2 Orange  x   Banded bilateral shoulder ER 15 reps x 2 Orange  x   Serratus wall slides 20 reps  Foam roller on wall x   Supine snow angels over 1/2 foam roll 15 reps   x                        Other: Manual therapy x 12 min  -STM/release to L cervical paraspinals, upper trap and suboccipitals  -Grade II/III mobilizations (PA and facet glides) at C3-C7  -Grade III/IV PA mobilizations to upper/mid thoracic spine T1-T8    Sleeping postural education- trial pillow under knees to ease tension on lower back, support cervical curve with a towel roll    Specific Instructions for next treatment: Progress cervicothoracic and scapular stabilization as tolerated      Assessment: [x] Progressing toward goals. [] No change. [] Other:    [] Patient would continue to benefit from skilled physical therapy services in order to: manage pain and improve strength/stability of cervicothoracic spine    12/8: Progressed scapular stabilization activities with good tolerance, but difficulty into full overhead range due to soft tissue restrictions and decreased segmental thoracic mobility. Improved overhead range post session but will require additional progressive stabilization and manual therapy to modulate sx and improve functional activity tolerances to his premorbid level of function. STG: (to be met in 6 treatments)  1. ? Pain: Pt will self report worst pain no greater than 3/10 in order to demonstrate improved tolerance with ADLs. 2. ? ROM: Pt will demonstrate improved cervical ROM to 75% or greater in all planes to improve pt's ability turn head while driving  3. ? Function: Pt will self report ability to grocery shop and lift household items without increased pain in order to demonstrate improved ADL participation. LTG: (to be met in 12 treatments)  1. Pt will demonstrate improved cervical ROM to 90% or greater in all planes for unrestricted motion at his PLOF  2. Pt will demonstrate independence in a long term HEP for continued progress outside of PT    Pt. Education:  [x] Yes  [] No  [] Reviewed Prior HEP/Ed  Method of Education: [x] Verbal  [x] Demo  [x] Written  Comprehension of Education:  [x] Verbalizes understanding. [x] Demonstrates understanding. [] Needs review. [] Demonstrates/verbalizes HEP/Ed previously given. Plan: [x] Continue per plan of care.    [] Other:      Treatment Charges: Mins Units   []  Modalities     [x]  Ther Exercise 26 2   [x]  Manual Therapy 12 1   []  Ther Activities     []  Aquatics     [x]  Neuromuscular 8 0   [] Vasocompression     [] Gait Training     [] Dry needling [] 1 or 2 muscles        [] 3 or more muscles     []  Other     Total Treatment time 46 3     Time In: 9:56am            Time Out: 10:42am    Electronically signed by:  Ernesto Leon PT

## 2020-12-10 ENCOUNTER — HOSPITAL ENCOUNTER (OUTPATIENT)
Dept: PHYSICAL THERAPY | Age: 35
Setting detail: THERAPIES SERIES
Discharge: HOME | End: 2020-12-10
Payer: COMMERCIAL

## 2020-12-10 PROCEDURE — 97110 THERAPEUTIC EXERCISES: CPT

## 2020-12-10 PROCEDURE — 97140 MANUAL THERAPY 1/> REGIONS: CPT

## 2020-12-10 NOTE — FLOWSHEET NOTE
curve with a towel roll    Specific Instructions for next treatment: Progress cervicothoracic and scapular stabilization as tolerated      Assessment: [x] Progressing toward goals. [] No change. [] Other:    [] Patient would continue to benefit from skilled physical therapy services in order to: manage pain and improve strength/stability of cervicothoracic spine    12/10: L shoulder & thoracic segments continues to be restricted in overhead movement and may be partially responsible for recurrent soft tissue tightness/dysfunction in upper trap and levator scap due to compensatory movement patterns. Difficulty with addition of quadruped thoracic rotations today but overall tolerates all treatment interventions well. Discussed trigger point dry needling today as a manual treatment option as pt will likely be a good candidate given his sx/presentation. Pt verbalizes interest and would like to proceed in a future visit. STG: (to be met in 6 treatments)  1. ? Pain: Pt will self report worst pain no greater than 3/10 in order to demonstrate improved tolerance with ADLs. 2. ? ROM: Pt will demonstrate improved cervical ROM to 75% or greater in all planes to improve pt's ability turn head while driving  3. ? Function: Pt will self report ability to grocery shop and lift household items without increased pain in order to demonstrate improved ADL participation. LTG: (to be met in 12 treatments)  1. Pt will demonstrate improved cervical ROM to 90% or greater in all planes for unrestricted motion at his PLOF  2. Pt will demonstrate independence in a long term HEP for continued progress outside of PT    Pt. Education:  [x] Yes  [] No  [] Reviewed Prior HEP/Ed  Method of Education: [x] Verbal  [x] Demo  [x] Written  Comprehension of Education:  [x] Verbalizes understanding. [x] Demonstrates understanding. [] Needs review. [] Demonstrates/verbalizes HEP/Ed previously given.      Plan: [x] Continue per plan of care.   [] Other:      Treatment Charges: Mins Units   []  Modalities     [x]  Ther Exercise 20 2   [x]  Manual Therapy 12 1   []  Ther Activities     []  Aquatics     [x]  Neuromuscular 8 0   [] Vasocompression     [] Gait Training     [] Dry needling        [] 1 or 2 muscles        [] 3 or more muscles     []  Other     Total Treatment time 40 3     Time In: 10:00am            Time Out: 10:40am    Electronically signed by:  Jorge Bradley PT

## 2020-12-15 ENCOUNTER — HOSPITAL ENCOUNTER (OUTPATIENT)
Dept: PHYSICAL THERAPY | Age: 35
Setting detail: THERAPIES SERIES
Discharge: HOME | End: 2020-12-15
Payer: COMMERCIAL

## 2020-12-15 PROCEDURE — 97110 THERAPEUTIC EXERCISES: CPT

## 2020-12-15 PROCEDURE — 97140 MANUAL THERAPY 1/> REGIONS: CPT

## 2020-12-15 NOTE — FLOWSHEET NOTE
[address] [facility]   [address]   Phone: [phone]   Fax: [phone]    Physical Therapy Daily Treatment Note      Date:  12/15/2020  Patient Name:  Sarah Gomez    :  1985  MRN: [id number]  Physician: Ruddy Olivo DO     Insurance: [address] [place] [address], 27 visits  Diagnosis: Neck pain (chronic)   Onset Date:    Next Dr. Eliane Ramoss: 20  Visit# / total visits:   Cancels/No Shows: 0    Subjective:    Pain:  [x] Yes  [] No Location: Midline cervical, upper thoracic and R trap region  Pain Rating: (0-10 scale) 310   Pain altered Tx:  [x] No  [] Yes  Action:  Comments: 12/15: Pt feels that therapy seems to be helping his neck and shoulder, but has been having more tightness and discomfort in his lower back recently. Follows up with his physician this Thursday.     Objective:  Modalities:   Precautions: None  Exercises:  Exercise Reps/ Time Weight/ Level Comments Performed=x   Sidelying book openers 10 reps x 2  R/L each    Cat/cows 15 reps      Quadruped rock backs 10 reps      Standing active assistive shoulder elevation w/dowel 15 reps      Supine thoracic extension over foam roller 10 reps x 2      Seated thoracic extension rotation w/hands behind head   Elbows on mat, holding dowel behind shoulders Pt sitting on stool scooting away from table    Banded bilateral shoulder horizontal abduction 15 reps x 2 Orange     Banded bilateral shoulder ER 15 reps x 2 Orange     Serratus wall slides 20 reps  Foam roller on wall x   Supine snow angels over 1/2 foam roll 15 reps      Supine D2 PNF for L shoulder 10 reps x 2  Moderate manual resistance through both flexion & extension phase x   Quadruped thoracic rotations 10 reps L/R   x   Prone Ys, Ts, and Is 10 reps x 2   Left x   Upper & lower trunk rotation 20 reps  Hooklying; UEs at 90 deg flex x                        Other: Manual therapy x 10 min  -STM/release to L cervical paraspinals, upper trap and suboccipitals  -Grade II/III posterior GH mobs to L shoulder  -Grade III/IV PA mobilizations to upper/mid thoracic spine T1-T8      Specific Instructions for next treatment: Progress cervicothoracic and scapular stabilization as tolerated      Assessment: [x] Progressing toward goals. [] No change. [] Other:    [] Patient would continue to benefit from skilled physical therapy services in order to: manage pain and improve strength/stability of cervicothoracic spine    12/15: Increased c/o lower back pain over past few days and encouraged pt to discuss sx at next follow up physician appt this Thursday. May benefit from PT to include treatment of lumbar spine as well. Otherwise, cervicothoracic mobility seems to be improving with less discomfort through daily activities in this region. STG: (to be met in 6 treatments)  1. ? Pain: Pt will self report worst pain no greater than 3/10 in order to demonstrate improved tolerance with ADLs. 2. ? ROM: Pt will demonstrate improved cervical ROM to 75% or greater in all planes to improve pt's ability turn head while driving  3. ? Function: Pt will self report ability to grocery shop and lift household items without increased pain in order to demonstrate improved ADL participation. LTG: (to be met in 12 treatments)  1. Pt will demonstrate improved cervical ROM to 90% or greater in all planes for unrestricted motion at his PLOF  2. Pt will demonstrate independence in a long term HEP for continued progress outside of PT    Pt. Education:  [x] Yes  [] No  [] Reviewed Prior HEP/Ed  Method of Education: [x] Verbal  [x] Demo  [x] Written  Comprehension of Education:  [x] Verbalizes understanding. [x] Demonstrates understanding. [] Needs review. [] Demonstrates/verbalizes HEP/Ed previously given. Plan: [x] Continue per plan of care.    [] Other:      Treatment Charges: Mins Units   []  Modalities     [x]  Ther Exercise 30 2   [x]  Manual Therapy 10 1   []  Ther Activities     []  Aquatics     []  Neuromuscular     [] Vasocompression     [] Gait Training     [] Dry needling        [] 1 or 2 muscles        [] 3 or more muscles     []  Other     Total Treatment time 40 3     Time In: 10:03am            Time Out: 10:43am    Electronically signed by:  Doug Valenzuela PT

## 2020-12-17 ENCOUNTER — HOSPITAL ENCOUNTER (OUTPATIENT)
Dept: PHYSICAL THERAPY | Age: 35
Setting detail: THERAPIES SERIES
Discharge: HOME | End: 2020-12-17
Payer: COMMERCIAL

## 2020-12-17 NOTE — CARE COORDINATION
[] [facility]) - [facility]  [address]    [phone]  F: [phone]   [] [address]  [facility] [address]   [address]  P: [phone]  F: [phone]  [] [address] &  Therapy  [address] [facility]  P: [phone]  F: [phone] [] [address] [employer]  P: [phone]  F: [phone]  [] [address]  [facility]   [address]   [place]: [phone]  F: [phone]   [x] [place] [id number] [facility] [address]  [place]: [phone]   F: [phone]     Physical Therapy Cancel/No Show note    Date: 2020  Patient: Danitza Nevarez  : 1985  MRN: [id number]    Cancels/No Shows to date:     For today's appointment patient:    [x]  Cancelled    [] Rescheduled appointment    [] No-show     Reason given by patient:    []  Patient ill    []  Conflicting appointment    [x] No transportation      [] Conflict with work    [] No reason given    [] Weather related    [] COVID-19    [x] Other:      Comments:  Car troubles       [] Next appointment was confirmed    Electronically signed by: Damion Doherty PT

## 2020-12-21 ENCOUNTER — HOSPITAL ENCOUNTER (OUTPATIENT)
Dept: PHYSICAL THERAPY | Age: 35
Setting detail: THERAPIES SERIES
Discharge: HOME | End: 2020-12-21
Payer: COMMERCIAL

## 2020-12-21 PROCEDURE — 97110 THERAPEUTIC EXERCISES: CPT

## 2020-12-21 PROCEDURE — 97140 MANUAL THERAPY 1/> REGIONS: CPT

## 2020-12-21 NOTE — FLOWSHEET NOTE
[facility]   [address]   Phone: [phone]   Fax: [phone]    Physical Therapy Daily Treatment Note      Date:  2020  Patient Name:  Mitzi Massey    :  1985  MRN: [id number]  Physician: Urszula Bridges DO     Insurance: [place], 27 visits  Diagnosis: Neck pain (chronic)   Onset Date:    Next Dr. [place]: 21  Visit# / total visits:   Cancels/No Shows: 1/0    Subjective:    Pain:  [x] Yes  [] No Location: Midline cervical, upper thoracic and R trap region  Pain Rating: (0-10 scale) 2/10   Pain altered Tx:  [x] No  [] Yes  Action:  Comments: : Pt states that he had to miss his PT and neurology appt last week due to a dead battery in his car. Otherwise, felt good relief in his neck after getting injections at his pain mgmt appt this past Friday and reports pain only at 1-2/10 currently.     Objective:  Modalities:   Precautions: None  Exercises:  Exercise Reps/ Time Weight/ Level Comments Performed=x   Sidelying book openers 10 reps x 2  R/L each    Cat/cows 15 reps   x   Quadruped rock backs 10 reps      Standing active assistive shoulder elevation w/dowel 15 reps      Supine thoracic extension over foam roller 10 reps x 2      Seated thoracic extension rotation w/hands behind head   Elbows on mat, holding dowel behind shoulders Pt sitting on stool scooting away from table    Banded bilateral shoulder horizontal abduction 15 reps x 2 Orange     Banded bilateral shoulder ER 15 reps x 2 Orange     Serratus wall slides 20 reps  Foam roller on wall x   Supine snow angels over 1/2 foam roll 15 reps      Supine D2 PNF for L shoulder 15 reps x 2  Moderate manual resistance through both flexion & extension phase x   Quadruped thoracic rotations 10 reps L/R   x   Prone Ys, Ts, and Is 10 reps x 2   Left    Upper & lower trunk rotation 20 reps  Hooklying; UEs at 90 deg flex    Supine punches 20 reps x 2 5# Bilateral x   Banded mid rows to ER 15 reps Green tubing  x          Other: Manual therapy x 10 min  -STM/release to L cervical paraspinals, upper trap and suboccipitals  -Grade II/III posterior GH mobs to L shoulder  -Grade III/IV PA mobilizations to upper/mid thoracic spine T1-T8      Specific Instructions for next treatment: Progress cervicothoracic and scapular stabilization as tolerated      Assessment: [x] Progressing toward goals. [] No change. [] Other:    [] Patient would continue to benefit from skilled physical therapy services in order to: manage pain and improve strength/stability of cervicothoracic spine    12/21: Improving sx in cervical spine with less discomfort compared to previous week likely due to a combination of therapeutic interventions as well as a recent injection from pain mgmt. Continues to demonstrate segmental thoracic restrictions but overall is tolerating progressive weighted/resistive stabilization activities mostly without discomfort. Mild strain/fatigue reported in cervical region after end of addition of B mid rows to ER but ceased after stopping activity     STG: (to be met in 6 treatments)  1. ? Pain: Pt will self report worst pain no greater than 3/10 in order to demonstrate improved tolerance with ADLs. 2. ? ROM: Pt will demonstrate improved cervical ROM to 75% or greater in all planes to improve pt's ability turn head while driving  3. ? Function: Pt will self report ability to grocery shop and lift household items without increased pain in order to demonstrate improved ADL participation. LTG: (to be met in 12 treatments)  1. Pt will demonstrate improved cervical ROM to 90% or greater in all planes for unrestricted motion at his PLOF  2. Pt will demonstrate independence in a long term HEP for continued progress outside of PT    Pt.  Education:  [x] Yes  [] No  [] Reviewed Prior HEP/Ed  Method of Education: [x] Verbal  [x] Demo  [x] Written  Comprehension of Education:  [x] [employer] understanding. [x] Demonstrates understanding. [] Needs review. [] Demonstrates/verbalizes HEP/Ed previously given. Plan: [x] Continue per plan of care.    [] Other:      Treatment Charges: Mins Units   []  Modalities     [x]  Ther Exercise 30 2   [x]  Manual Therapy 10 1   []  Ther Activities     []  Aquatics     []  Neuromuscular     [] Vasocompression     [] Gait Training     [] Dry needling        [] 1 or 2 muscles        [] 3 or more muscles     []  Other     Total Treatment time 40 3     Time In: 9:00am            Time Out: 9:40am    Electronically signed by:  Jorge Bradley PT

## 2020-12-23 ENCOUNTER — HOSPITAL ENCOUNTER (OUTPATIENT)
Dept: PHYSICAL THERAPY | Age: 35
Setting detail: THERAPIES SERIES
Discharge: HOME | End: 2020-12-23
Payer: COMMERCIAL

## 2020-12-23 PROCEDURE — 97140 MANUAL THERAPY 1/> REGIONS: CPT

## 2020-12-23 PROCEDURE — 97110 THERAPEUTIC EXERCISES: CPT

## 2020-12-23 NOTE — FLOWSHEET NOTE
[address]  [facility]   [address] [facility] [address]   Phone: [phone]   Fax: [phone]    Physical Therapy Daily Treatment Note      Date:  2020  Patient Name:  Heri Jeffries    :  1985  MRN: [id number]  Physician: Viki Cueto DO     Insurance: [place], 27 visits  Diagnosis: Neck pain (chronic)   Onset Date:    Next Dr. Salgado Docker: 21  Visit# / total visits:   Cancels/No Shows: 10    Subjective:    Pain:  [x] Yes  [] No Location: Midline cervical, upper thoracic and R trap region  Pain Rating: (0-10 scale) 5/10   Pain altered Tx:  [x] No  [] Yes  Action:  Comments: : Pt states that he had a great day yesterday but is struggling with more pain today. Tried to sleep in his bed last night which did not go well and is looking into purchasing a new mattress.      Objective:  Modalities:   Precautions: None  Exercises:  Exercise Reps/ Time Weight/ Level Comments Performed=x   Sidelying book openers 10 reps x 2  R/L each    Cat/cows 15 reps      Quadruped rock backs 10 reps      Standing active assistive shoulder elevation w/dowel 15 reps      Supine thoracic extension over foam roller 10 reps x 1   x   Seated thoracic extension rotation w/hands behind head   Elbows on mat, holding dowel behind shoulders Pt sitting on stool scooting away from table    Banded bilateral shoulder horizontal abduction 15 reps x 2 Orange     Banded bilateral shoulder ER 15 reps x 2 Orange     Serratus wall slides 20 reps  Foam roller on wall    Supine snow angels over 1/2 foam roll 15 reps      Supine D2 PNF for L shoulder 15 reps x 2  Moderate manual resistance through both flexion & extension phase x   Quadruped thoracic rotations 10 reps L/R   x   Prone Ys, Ts, and Is 10 reps x 2   Left    Upper & lower trunk rotation 20 reps  Hooklying; UEs at 90 deg flex    Supine punches 15 reps x 3 5# Bilateral x   Banded mid rows 15 reps x 2 Green tubing  x   Banded ER at 0 deg 15 reps x 2 Green tubing R/L x   Other: Manual therapy x 10 min  -STM/release to L cervical paraspinals, upper trap and suboccipitals  -Grade II/III posterior GH mobs to L shoulder  -Grade III/IV PA mobilizations to upper/mid thoracic spine T1-T8  -Grade III/IV facet mobs at lower cervical segments      Specific Instructions for next treatment: Progress cervicothoracic and scapular stabilization as tolerated      Assessment: [x] Progressing toward goals. [] No change. [] Other:    [] Patient would continue to benefit from skilled physical therapy services in order to: manage pain and improve strength/stability of cervicothoracic spine    12/23: More pain & tightness today compared to previous visits, especially in lower back which was pt's primary limiting factor during session today. Cues necessary to maintain neutral spine with thoracic mobility focused interventions and will require additional reinforcement. STG: (to be met in 6 treatments)  1. ? Pain: Pt will self report worst pain no greater than 3/10 in order to demonstrate improved tolerance with ADLs. 2. ? ROM: Pt will demonstrate improved cervical ROM to 75% or greater in all planes to improve pt's ability turn head while driving  3. ? Function: Pt will self report ability to grocery shop and lift household items without increased pain in order to demonstrate improved ADL participation. LTG: (to be met in 12 treatments)  1. Pt will demonstrate improved cervical ROM to 90% or greater in all planes for unrestricted motion at his PLOF  2. Pt will demonstrate independence in a long term HEP for continued progress outside of PT    Pt. Education:  [x] Yes  [] No  [] Reviewed Prior HEP/Ed  Method of Education: [x] Verbal  [x] Demo  [x] Written  Comprehension of Education:  [x] Verbalizes understanding. [x] Demonstrates understanding. [] Needs review. [] Demonstrates/verbalizes HEP/Ed previously given. Plan: [x] Continue per plan of care.    [] Other:      Treatment Charges: Mins Units   []  Modalities     [x]  Ther Exercise 30 2   [x]  Manual Therapy 10 1   []  Ther Activities     []  Aquatics     []  Neuromuscular     [] Vasocompression     [] Gait Training     [] Dry needling        [] 1 or 2 muscles        [] 3 or more muscles     []  Other     Total Treatment time 40 3     Time In: 9:35am            Time Out: 10:15am    Electronically signed by:  Ardith Gowers, PT

## 2020-12-28 ENCOUNTER — HOSPITAL ENCOUNTER (OUTPATIENT)
Dept: PHYSICAL THERAPY | Age: 35
Setting detail: THERAPIES SERIES
Discharge: HOME | End: 2020-12-28
Payer: COMMERCIAL

## 2020-12-28 PROCEDURE — 97110 THERAPEUTIC EXERCISES: CPT

## 2020-12-28 PROCEDURE — 97140 MANUAL THERAPY 1/> REGIONS: CPT

## 2020-12-28 NOTE — FLOWSHEET NOTE
rows 15 reps x 2 Green tubing     Banded ER at 0 deg 15 reps x 2 Green tubing R/L           Other: Manual therapy x 11 min  -STM/release to R cervical paraspinals, upper trap and suboccipitals  -Grade III/IV PA mobilizations to upper/mid thoracic spine T1-T8  -Grade III/IV facet mobs at lower cervical segments      Specific Instructions for next treatment: Progress cervicothoracic and scapular stabilization as tolerated      Assessment: [x] Progressing toward goals. [] No change. [] Other:    [] Patient would continue to benefit from skilled physical therapy services in order to: manage pain and improve strength/stability of cervicothoracic spine    12/28: More tenderness at R cervical region today but no significant complaints with regard to L shoulder. Low back pain continues to be one of pt's most limiting factors through every exercise despite cues for technique and integration of core control with all activities. Performed B banded ER while seated on swiss ball with good scapular control but quick fatigue in low back. STG: (to be met in 6 treatments)  1. ? Pain: Pt will self report worst pain no greater than 3/10 in order to demonstrate improved tolerance with ADLs. 2. ? ROM: Pt will demonstrate improved cervical ROM to 75% or greater in all planes to improve pt's ability turn head while driving  3. ? Function: Pt will self report ability to grocery shop and lift household items without increased pain in order to demonstrate improved ADL participation. LTG: (to be met in 12 treatments)  1. Pt will demonstrate improved cervical ROM to 90% or greater in all planes for unrestricted motion at his PLOF  2. Pt will demonstrate independence in a long term HEP for continued progress outside of PT    Pt. Education:  [x] Yes  [] No  [] Reviewed Prior HEP/Ed  Method of Education: [x] Verbal  [x] Demo  [x] Written  Comprehension of Education:  [x] Verbalizes understanding. [x] Demonstrates understanding.   [] Needs review. [] Demonstrates/verbalizes HEP/Ed previously given. Plan: [x] Continue per plan of care.    [] Other:      Treatment Charges: Mins Units   []  Modalities     [x]  Ther Exercise 30 2   [x]  Manual Therapy 11 1   []  Ther Activities     []  Aquatics     []  Neuromuscular     [] Vasocompression     [] Gait Training     [] Dry needling        [] 1 or 2 muscles        [] 3 or more muscles     []  Other     Total Treatment time 41 3     Time In: 11:33am            Time Out: 12:14pm    Electronically signed by:  Tressa Munoz PT

## 2020-12-30 ENCOUNTER — HOSPITAL ENCOUNTER (OUTPATIENT)
Dept: PHYSICAL THERAPY | Age: 35
Setting detail: THERAPIES SERIES
Discharge: HOME | End: 2020-12-30
Payer: COMMERCIAL

## 2020-12-30 PROCEDURE — 97110 THERAPEUTIC EXERCISES: CPT

## 2020-12-30 PROCEDURE — 97140 MANUAL THERAPY 1/> REGIONS: CPT

## 2020-12-30 PROCEDURE — 97112 NEUROMUSCULAR REEDUCATION: CPT

## 2020-12-30 NOTE — FLOWSHEET NOTE
509 [facility] Outpatient Physical Therapy   [facility]   Phone: [phone]   Fax: [phone]    Physical Therapy Daily Treatment Note      Date:  2020  Patient Name:  Arun Ballard    :  1985  MRN: [id number]  Physician: Sera Betancourt DO     Insurance: [place], 27 visits  Diagnosis: Neck pain (chronic)   Onset Date:    Next Dr. Nataliia Tejeda: 21  Visit# / total visits:   Cancels/No Shows: 10    Subjective:    Pain:  [x] Yes  [] No Location: Midline cervical, upper thoracic and R trap region  Pain Rating: (0-10 scale) 310   Pain altered Tx:  [x] No  [] Yes  Action:  Comments: : Pt states that he thinks he tweaked the R side of his neck this morning, accidentally \"overstretching it\". 5/10 discomfort currently.     Objective:  Modalities:   Precautions: None  Exercises:  Exercise Reps/ Time Weight/ Level Comments Performed=x   Sidelying book openers 15 reps x 1  R/L each x   Cat/cows 15 reps      Quadruped rock backs 10 reps      Standing active assistive shoulder elevation w/dowel 15 reps      Supine thoracic extension over foam roller 10 reps x 1      Seated thoracic extension rotation w/hands behind head   Elbows on mat, holding dowel behind shoulders Pt sitting on stool scooting away from table    Banded bilateral shoulder horizontal abduction 15 reps x 2 Orange     Banded bilateral shoulder ER 15 reps x 3 Orange band Seated on blue swiss ball    Serratus wall slides 20 reps  Foam roller on wall    Supine snow angels over 1/2 foam roll 15 reps      Supine D2 PNF for L shoulder 15 reps x 2  Moderate manual resistance through both flexion & extension phase    Quadruped thoracic rotations 10 reps L/R      Bird dogs 20 reps x 2   x   Prone Ys, Ts, and Is 10 reps x 2   L/R each x   Upper & lower trunk rotation 20 reps  Hooklying; UEs at 90 deg flex    Supine punches 15 reps x 3 5# Bilateral    Banded mid rows 15 reps x 2 Green tubing     Banded ER at 0 deg 15 reps x 2 Green tubing R/L    Supine Chin Tucks 3'' hold x 15   x   Supine Chin tucks with rotation 10 reps L/R each   x                 Other: Manual therapy x 15 min  -STM/release to R cervical paraspinals, upper trap and suboccipitals  -Grade III/IV facet mobs at lower cervical segments  -MET in supine to improve L cervical rotation      Specific Instructions for next treatment: Progress cervicothoracic and scapular stabilization as tolerated      Assessment: [x] Progressing toward goals. [] No change. [] Other:    [] Patient would continue to benefit from skilled physical therapy services in order to: manage pain and improve strength/stability of cervicothoracic spine    12/30: Increased tension noted in R cervical region today, limiting L cervical rotation. Initiated chin tucks with rotation and METs for L cervical rotation with improved comfort and symmetry noted post session. Also added bird dogs today with good control, but discomfort and high levels of tension in lower back. STG: (to be met in 6 treatments)  1. ? Pain: Pt will self report worst pain no greater than 3/10 in order to demonstrate improved tolerance with ADLs. 2. ? ROM: Pt will demonstrate improved cervical ROM to 75% or greater in all planes to improve pt's ability turn head while driving  3. ? Function: Pt will self report ability to grocery shop and lift household items without increased pain in order to demonstrate improved ADL participation. LTG: (to be met in 12 treatments)  1. Pt will demonstrate improved cervical ROM to 90% or greater in all planes for unrestricted motion at his PLOF  2. Pt will demonstrate independence in a long term HEP for continued progress outside of PT    Pt. Education:  [x] Yes  [] No  [] Reviewed Prior HEP/Ed  Method of Education: [x] Verbal  [x] Demo  [x] Written  Comprehension of Education:  [x] Verbalizes understanding. [x] Demonstrates understanding. [] Needs review.   [] Demonstrates/verbalizes HEP/Ed previously given. Plan: [x] Continue per plan of care.    [] Other:      Treatment Charges: Mins Units   []  Modalities     [x]  Ther Exercise 15 1   [x]  Manual Therapy 15 1   []  Ther Activities     []  Aquatics     [x]  Neuromuscular 10 1   [] Vasocompression     [] Gait Training     [] Dry needling        [] 1 or 2 muscles        [] 3 or more muscles     []  Other     Total Treatment time 40 3     Time In: 11:20am            Time Out: 12:00pm    Electronically signed by:  Madelaine Foote PT

## 2021-01-04 ENCOUNTER — HOSPITAL ENCOUNTER (OUTPATIENT)
Dept: PHYSICAL THERAPY | Age: 36
Setting detail: THERAPIES SERIES
Discharge: HOME | End: 2021-01-04
Payer: COMMERCIAL

## 2021-01-04 PROCEDURE — 97110 THERAPEUTIC EXERCISES: CPT

## 2021-01-04 PROCEDURE — 97140 MANUAL THERAPY 1/> REGIONS: CPT

## 2021-01-04 NOTE — FLOWSHEET NOTE
509 [facility] Outpatient Physical Therapy   [address]   Phone: [phone]   Fax: [phone]    Physical Therapy Daily Treatment Note      Date:  2021  Patient Name:  Yanely Zapata    :  1985  MRN: [id number]  Physician: Kevyn Figueroa DO     Insurance: [place], 27 visits  Diagnosis: Neck pain (chronic)   Onset Date:    Next Dr. Javier Reyes: 21  Visit# / total visits:   Cancels/No Shows: 10    Subjective:    Pain:  [x] Yes  [] No Location: Midline cervical, upper thoracic and R trap region  Pain Rating: (0-10 scale) 3/10   Pain altered Tx:  [x] No  [] Yes  Action:  Comments: : Pt states that he confused his appt times today which is why he did not arrive as his usual scheduled appt time. Otherwise, stated that he had a pretty good weekend with manageable sx in his neck/shoulders. Did report a tension HA this morning but improved with his stretches and exercises. 3/10 generalized tension in the back of his neck currently but states that it's much improved from this morning.     Objective:  Modalities:   Precautions: None  Exercises:  Exercise Reps/ Time Weight/ Level Comments Performed=x   Sidelying book openers 15 reps x 1  R/L each    Cat/cows 15 reps      Quadruped rock backs 10 reps      Standing active assistive shoulder elevation w/dowel 15 reps      Supine thoracic extension over foam roller 10 reps x 1      Seated thoracic extension rotation w/hands behind head   Elbows on mat, holding dowel behind shoulders Pt sitting on stool scooting away from table    Banded bilateral shoulder horizontal abduction 15 reps x 2 Orange     Banded bilateral shoulder ER 15 reps x 3 Orange band Seated on blue swiss ball    Serratus wall slides 20 reps  Foam roller on wall    Supine snow angels over 1/2 foam roll 15 reps      Supine D2 PNF for L shoulder 15 reps x 2  Moderate manual resistance through both flexion & extension phase    Quadruped thoracic rotations 15 reps L/R   x   Bird dogs 20 reps x 2   x   Prone Ys, Ts, and Is 10 reps x 2   L/R each x   Upper & lower trunk rotation 20 reps  Hooklying; UEs at 90 deg flex    Supine punches 15 reps x 3 5# Bilateral    Banded mid rows 15 reps x 2 Green tubing  x   Banded IR at 0 deg 15 reps x 2 Green tubing Left only x   Banded ER at 0 deg 15 reps x 2 Green tubing Left only x   Pallof presses 20 reps ea direction Green tubing (double)  x   Supine Chin Tucks 3'' hold x 15      Supine Chin tucks with rotation 10 reps L/R each                    Other: Manual therapy x 12 min  -STM/release to R cervical paraspinals, upper trap and suboccipitals  -Grade III/IV facet mobs at lower cervical segments  -Grade III/IV PA mobs at mid thoracic segments T3-T10      Specific Instructions for next treatment: Progress cervicothoracic and scapular stabilization as tolerated      Assessment: [x] Progressing toward goals. [] No change. [] Other:    [] Patient would continue to benefit from skilled physical therapy services in order to: manage pain and improve strength/stability of cervicothoracic spine    1/4: Improving thoracic mobility observed with quadruped rotations today and better stability with bird dogs. Progressed banded exercises with fair tolerance, just soreness in L shoulder and mild discomfort in lower back. No complaints of neck pain post visit and pt is due for formal progress note at next visit to reassess progress towards goals. STG: (to be met in 6 treatments)  1. ? Pain: Pt will self report worst pain no greater than 3/10 in order to demonstrate improved tolerance with ADLs. 2. ? ROM: Pt will demonstrate improved cervical ROM to 75% or greater in all planes to improve pt's ability turn head while driving  3. ? Function: Pt will self report ability to grocery shop and lift household items without increased pain in order to demonstrate improved ADL participation. LTG: (to be met in 12 treatments)  1.  Pt will demonstrate improved cervical ROM to 90% or greater in all planes for unrestricted motion at his PLOF  2. Pt will demonstrate independence in a long term HEP for continued progress outside of PT    Pt. Education:  [x] Yes  [] No  [] Reviewed Prior HEP/Ed  Method of Education: [x] Verbal  [x] Demo  [x] Written  Comprehension of Education:  [x] Verbalizes understanding. [x] Demonstrates understanding. [] Needs review. [] Demonstrates/verbalizes HEP/Ed previously given. Plan: [x] Continue per plan of care.    [x] Other: Progress note at next visit      Treatment Charges: Mins Units   []  Modalities     [x]  Ther Exercise 28 2   [x]  Manual Therapy 12 1   []  Ther Activities     []  Aquatics     []  Neuromuscular     [] Vasocompression     [] Gait Training     [] Dry needling        [] 1 or 2 muscles        [] 3 or more muscles     []  Other     Total Treatment time 40 3     Time In: 12:00pm            Time Out: 12:40pm    Electronically signed by:  Musa Santos PT

## 2021-01-05 ENCOUNTER — HOSPITAL ENCOUNTER (OUTPATIENT)
Dept: GENERAL RADIOLOGY | Facility: CLINIC | Age: 36
Discharge: HOME | End: 2021-01-07
Payer: COMMERCIAL

## 2021-01-05 ENCOUNTER — HOSPITAL ENCOUNTER (OUTPATIENT)
Age: 36
Setting detail: SPECIMEN
Discharge: HOME | End: 2021-01-05
Payer: COMMERCIAL

## 2021-01-05 ENCOUNTER — HOSPITAL ENCOUNTER (OUTPATIENT)
Facility: CLINIC | Age: 36
Discharge: HOME | End: 2021-01-07
Payer: COMMERCIAL

## 2021-01-05 DIAGNOSIS — M54.2 NECK PAIN: ICD-10-CM

## 2021-01-05 DIAGNOSIS — M62.82 NON-TRAUMATIC RHABDOMYOLYSIS: ICD-10-CM

## 2021-01-05 LAB
ANION GAP SERPL CALCULATED.3IONS-SCNC: 13 MMOL/L (ref 9–17)
BUN BLDV-MCNC: 10 MG/DL (ref 6–20)
BUN/CREAT BLD: NORMAL (ref 9–20)
CALCIUM SERPL-MCNC: 9.1 MG/DL (ref 8.6–10.4)
CHLORIDE BLD-SCNC: 106 MMOL/L (ref 98–107)
CO2: 22 MMOL/L (ref 20–31)
CREAT SERPL-MCNC: 0.8 MG/DL (ref 0.7–1.2)
GFR AFRICAN AMERICAN: >60 ML/MIN
GFR NON-AFRICAN AMERICAN: >60 ML/MIN
GFR SERPL CREATININE-BSD FRML MDRD: NORMAL ML/MIN/{1.73_M2}
GFR SERPL CREATININE-BSD FRML MDRD: NORMAL ML/MIN/{1.73_M2}
GLUCOSE BLD-MCNC: 94 MG/DL (ref 70–99)
POTASSIUM SERPL-SCNC: 4.7 MMOL/L (ref 3.7–5.3)
SODIUM BLD-SCNC: 141 MMOL/L (ref 135–144)

## 2021-01-05 PROCEDURE — 72040 X-RAY EXAM NECK SPINE 2-3 VW: CPT

## 2021-01-06 ENCOUNTER — APPOINTMENT (OUTPATIENT)
Dept: PHYSICAL THERAPY | Age: 36
End: 2021-01-06
Payer: COMMERCIAL

## 2021-01-07 ENCOUNTER — HOSPITAL ENCOUNTER (OUTPATIENT)
Dept: PHYSICAL THERAPY | Age: 36
Setting detail: THERAPIES SERIES
Discharge: HOME | End: 2021-01-07
Payer: COMMERCIAL

## 2021-01-07 PROCEDURE — 97530 THERAPEUTIC ACTIVITIES: CPT

## 2021-01-07 NOTE — DISCHARGE SUMMARY
509 [facility] Outpatient Physical Therapy   [address]   Phone: [phone]   Fax: [phone]    Physical Therapy Progress Note & Discharge Summary      Date:  2021  Patient Name:  Mg Sierra    :  1985  MRN: [id number]  Physician: Osmin Navarro DO     Insurance: [place], 27 visits  Diagnosis: Neck pain (chronic)   Onset Date:    Next Dr. Membreno Lev: 21  Visit# / total visits:   Cancels/No Shows: 1    Subjective:    Pain:  [x] Yes  [] No Location: Midline cervical, upper thoracic and R trap region  Pain Rating: (0-10 scale) 2/10   Pain altered Tx:  [x] No  [] Yes  Action:  Comments: : Pt states that he was referred to a chiropractor and attended his initial assessment immediately prior to today's appt. Unchanged discomfort in neck (~2/10) but very high levels of pain in lower back and was not pleased with chiropractic treatment. Feels like he's made a good bit of progress with regard to his neck and states that sx seem to be manageable now. However, continues to complain of very high levels of back pain. Is pending another lumbar injection with pain mgmt and follows up with Dr. Juanito Greene next week. Still feels stiff in his neck when he first wakes, but otherwise has been compliant with PT home program and feels better after he does his exercises. NDI score ():  44% impaired    Objective:     All below objective measures from  assessment      Range of Motion  Left Range of Motion  Right Strength  Left Strength  Right   Cervical Flexion 100 100     Cervical Extension 100 100     Cervical Rotation 90 90     Cervical Side Bend 90 90     Shoulder Flexion   4+/5 4+/5   Shoulder Abduction   5/5 5/5   Shoulder Extension       Shoulder ER   4+/5 5/5   Shoulder IR   5/5 5/5   Elbow Flexion       Elbow Extension       Pronation       Supination       Wrist Flexion       Wrist Extension             Modalities:   Precautions: None  Exercises:  Exercise Reps/ Time Weight/ Level Comments Performed=x   Sidelying book openers 15 reps x 1  R/L each    Cat/cows 15 reps      Quadruped rock backs 10 reps      Standing active assistive shoulder elevation w/dowel 15 reps      Supine thoracic extension over foam roller 10 reps x 1      Seated thoracic extension rotation w/hands behind head   Elbows on mat, holding dowel behind shoulders Pt sitting on stool scooting away from table    Banded bilateral shoulder horizontal abduction 15 reps x 2 Orange     Banded bilateral shoulder ER 15 reps x 3 Orange band Seated on blue swiss ball    Serratus wall slides 20 reps  Foam roller on wall    Supine snow angels over 1/2 foam roll 15 reps      Supine D2 PNF for L shoulder 15 reps x 2  Moderate manual resistance through both flexion & extension phase    Quadruped thoracic rotations 15 reps L/R      Bird dogs 20 reps x 2      Prone Ys, Ts, and Is 10 reps x 2   L/R each    Upper & lower trunk rotation 20 reps  Hooklying; UEs at 90 deg flex    Supine punches 15 reps x 3 5# Bilateral    Banded mid rows 15 reps x 2 Green tubing     Banded IR at 0 deg 15 reps x 2 Green tubing Left only    Banded ER at 0 deg 15 reps x 2 Green tubing Left only    Pallof presses 20 reps ea direction Green tubing (double)     Supine Chin Tucks 3'' hold x 15      Supine Chin tucks with rotation 10 reps L/R each                    Other:   Formal subjective & objective reassessment. Patient education on recommendation for focus on LBP at this point - billed as theract (x 30')  -Advised pt to discuss potential PT trial for lower back with MD      Specific Instructions for next treatment: Progress cervicothoracic and scapular stabilization as tolerated      Assessment: [x] Progressing toward goals. [x] No change.      [] Other:    [] Patient would continue to benefit from skilled physical therapy services in order to: manage pain and improve strength/stability of cervicothoracic spine    1/7: Pt has been seen for 12 PT visits to date for his chronic neck pain & tightness. Overall demonstrates very good progress to date with symmetrical and near full cervical AROM in all planes and reports reduced frequency of migraine headaches. Strength in B UEs is [facility]. Continues to report stiffness first thing in the morning but demonstrates excellent compliance with PT home program which pt states has made sx manageable. However, pt's primary limiting factor at this time is his lower back pain which is constant with all activities. Pt is pending another injection with pain mgmt and follows up with his referring physician next week. Advised pt to discuss treatment options with physician as may benefit from a script for formal treatment of low back pain. Good understanding noted, and otherwise pt is appropriate to discharge for this episode of care (neck) at this time). Will require a new evaluation for back pain. STG: (to be met in 6 treatments)  1. ? Pain: Pt will self report worst pain no greater than 3/10 in order to demonstrate improved tolerance with ADLs. GOAL MET (IN NECK) 1/7  2. ? ROM: Pt will demonstrate improved cervical ROM to 75% or greater in all planes to improve pt's ability turn head while driving GOAL MET 1/7  3. ? Function: Pt will self report ability to grocery shop and lift household items without increased pain in order to demonstrate improved ADL participation. GOAL MET 1/7  LTG: (to be met in 12 treatments)  1. Pt will demonstrate improved cervical ROM to 90% or greater in all planes for unrestricted motion at his PLOF GOAL MET 1/7  2. Pt will demonstrate independence in a long term HEP for continued progress outside of PT GOAL MET 1/7    Pt. Education:  [x] Yes  [] No  [x] Reviewed Prior HEP/Ed  Method of Education: [x] Verbal  [x] Demo  [] Written  Comprehension of Education:  [x] Verbalizes understanding. [x] Demonstrates understanding. [] Needs review.   [] Demonstrates/verbalizes HEP/Ed previously given.     Plan: [x] Discharge to independent [facility] but may benefit from referral to more formally assess LBP      Treatment Charges: Mins Units   []  Modalities     []  Ther Exercise     []  Manual Therapy     [x]  Ther Activities 30 2   []  Aquatics     []  Neuromuscular     [] Vasocompression     [] Gait Training     [] Dry needling        [] 1 or 2 muscles        [] 3 or more muscles     []  Other     Total Treatment time 35 2     Time In: 1:00pm            Time Out: 1:35pm    Electronically signed by:  Svitlana Petty PT

## 2021-01-14 ENCOUNTER — OFFICE VISIT (OUTPATIENT)
Dept: NEUROSURGERY | Age: 36
End: 2021-01-14
Payer: COMMERCIAL

## 2021-01-14 VITALS
WEIGHT: 250 LBS | SYSTOLIC BLOOD PRESSURE: 122 MMHG | BODY MASS INDEX: 33.13 KG/M2 | TEMPERATURE: 97.2 F | DIASTOLIC BLOOD PRESSURE: 76 MMHG | RESPIRATION RATE: 18 BRPM | HEIGHT: 73 IN | OXYGEN SATURATION: 98 % | HEART RATE: 90 BPM

## 2021-01-14 DIAGNOSIS — M54.50 CHRONIC MIDLINE LOW BACK PAIN WITHOUT SCIATICA: Primary | ICD-10-CM

## 2021-01-14 DIAGNOSIS — G89.29 CHRONIC MIDLINE LOW BACK PAIN WITHOUT SCIATICA: Primary | ICD-10-CM

## 2021-01-14 DIAGNOSIS — M54.2 NECK PAIN: ICD-10-CM

## 2021-01-14 PROCEDURE — G8427 DOCREV CUR MEDS BY ELIG CLIN: HCPCS | Performed by: NEUROLOGICAL SURGERY

## 2021-01-14 PROCEDURE — G8484 FLU IMMUNIZE NO ADMIN: HCPCS | Performed by: NEUROLOGICAL SURGERY

## 2021-01-14 PROCEDURE — 99214 OFFICE O/P EST MOD 30 MIN: CPT | Performed by: NEUROLOGICAL SURGERY

## 2021-01-14 PROCEDURE — 1036F TOBACCO NON-USER: CPT | Performed by: NEUROLOGICAL SURGERY

## 2021-01-14 PROCEDURE — G8417 CALC BMI ABV UP PARAM F/U: HCPCS | Performed by: NEUROLOGICAL SURGERY

## 2021-01-14 NOTE — PROGRESS NOTES
Department of Neurosurgery                                                      Follow up visit      History Obtained From:  patient    CHIEF COMPLAINT:         Chief Complaint   Patient presents with    Follow-up     cerv x ray       HISTORY OF PRESENT ILLNESS:       The patient is a 37 y.o. male who presents for follow up for neck and back pain. He finished PT with improved neck pain. He still has quite a bit of stiffness in the morning. He still does not have any neurological symptoms such as weakness or numbness in arm or leg. He had an injection to the neck by his pain medicine physician with 2 hours of total relief. He is scheduled for another injection and potential ablation however insurance denied it. He is a bit disheartened from this. He has not had physical therapy for his low back pain yet. PAST MEDICAL HISTORY :       Past Medical History:        Diagnosis Date    Chronic kidney disease        Past Surgical History:    No past surgical history on file.     Social History:   Social History     Socioeconomic History    Marital status:      Spouse name: Not on file    Number of children: 2    Years of education: Not on file    Highest education level: Not on file   Occupational History    Not on file   Social Needs    Financial resource strain: Not on file    Food insecurity     Worry: Not on file     Inability: Not on file   [place] needs     Medical: Not on file     Non-medical: Not on file   Tobacco Use    Smoking status: Former Smoker     Packs/day: 1.50     Years: 4.00     Pack years: 6.00     Quit date: 2011     Years since quitting: 10.0    Smokeless tobacco: Former User     Types: Chew     Quit date: 2018   Substance and Sexual Activity    Alcohol use: Not Currently    Drug use: No    Sexual activity: Not Currently     Partners: Female   Lifestyle    Physical activity     Days per week: Not on file     Minutes per session: Not on file  Stress: Not on file   Relationships    Social connections     Talks on phone: Not on file     Gets together: Not on file     Attends Anabaptism service: Not on file     Active member of club or organization: Not on file     Attends meetings of clubs or organizations: Not on file     Relationship status: Not on file    Intimate partner violence     Fear of current or ex partner: Not on file     Emotionally abused: Not on file     Physically abused: Not on file     Forced sexual activity: Not on file   Other Topics Concern    Not on file   Social History Narrative    Not on file       Family History:   No family history on file. Allergies:  Patient has no known allergies. Home Medications:  Prior to Admission medications    Medication Sig Start Date End Date Taking? Authorizing Provider   SUMAtriptan (IMITREX) 100 MG tablet TAKE 1 TABLET BY MOUTH 1 TIME AS NEEDED FOR MIGRAINE 1/4/21  Yes Jerry Bravo MD   traZODone (DESYREL) 50 MG tablet Take 1 tablet by mouth nightly as needed for Sleep 9/24/20  Yes Jerry Bravo MD   lurasidone (LATUDA) 40 MG TABS tablet Take 1 tablet by mouth Daily with supper 9/24/20  Yes Jerry Bravo MD   buPROPion (WELLBUTRIN XL) 150 MG extended release tablet  7/25/19  Yes Historical Provider, MD   metoprolol tartrate (LOPRESSOR) 25 MG tablet Take 1 tablet by mouth 2 times daily 9/8/20  Yes Jerry Bravo MD   ketoconazole (NIZORAL) 2 % shampoo Use as body wash leaving on for 5 minutes prior to washing off once daily for 2 weeks then three times weekly 8/17/20  Yes Pebbles Hart MD   pregabalin (LYRICA) 25 MG capsule Take 100 mg by mouth 2 times daily. Yes Historical Provider, MD       Current Medications:   No current facility-administered medications for this visit.        PHYSICAL EXAM:       /76   Pulse 90   Temp 97.2 °F (36.2 °C)   Resp 18   Ht 6' 1\" (1.854 m)   Wt 250 lb (113.4 kg)   SpO2 98%   BMI 32.98 kg/m²   Physical Exam

## 2021-01-18 ENCOUNTER — HOSPITAL ENCOUNTER (OUTPATIENT)
Dept: PHYSICAL THERAPY | Age: 36
Setting detail: THERAPIES SERIES
Discharge: HOME | End: 2021-01-18
Payer: COMMERCIAL

## 2021-01-18 PROCEDURE — 97530 THERAPEUTIC ACTIVITIES: CPT
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
## 2021-01-21 ENCOUNTER — HOSPITAL ENCOUNTER (OUTPATIENT)
Dept: PHYSICAL THERAPY | Age: 36
Setting detail: THERAPIES SERIES
Discharge: HOME | End: 2021-01-21
Payer: COMMERCIAL

## 2021-01-21 PROCEDURE — 97112 NEUROMUSCULAR REEDUCATION: CPT

## 2021-01-21 PROCEDURE — 97140 MANUAL THERAPY 1/> REGIONS: CPT

## 2021-01-21 PROCEDURE — 97110 THERAPEUTIC EXERCISES: CPT

## 2021-01-21 NOTE — FLOWSHEET NOTE
509 [facility] Out[facility] Physical Therapy   [address] [facility] [address]   Phone: [phone]   Fax: [phone]    Physical Therapy Daily Treatment Note      Date:  2021  Patient Name:  Sho Kent    :  1985  MRN: [id number]  Physician: Isaac Childers MD     Insurance: [employer] 30 visits  Diagnosis:   M54.5, G89.29 (ICD-10-CM) - Chronic midline low back pain without sciatica   M54.2 (ICD-10-CM) - Neck pain     Onset Date:    Next Dr. Birgit Mcgregor: None scheduled. Pending insurance authorization for injections  Visit# / total visits: 2/  Cancels/No Shows: 0/0    Subjective:    Pain:  [x] Yes  [] No Location: Low back Pain Rating: (0-10 scale) 6/10  Pain altered Tx:  [] No  [] Yes  Action:  Comments:    Objective:  Modalities:   Precautions: None  Exercises:  Exercise Reps/ Time Weight/ Level Comments Performed=x   Prone press ups 10 reps  Attempted in pain free range but not able to tolerate    Lower trunk rotations 20 reps   x   SKTC 30'' x 3  R LE; towel behind knee x   Seated figure 4 30'' x 3  R LE x   Dynamic HS stretch in 90-90 2'' hold x 15  R LE x   Supine marching (2 up-2 down) with TrA 15 reps x 2   x   Clams 15 reps x 3  Right x          Other: Manual therapy x 10 min  -STM to B lumbar paraspinals (R>L)  -Grade II/III PA glides L1-L4    Specific Instructions for next treatment: Manual therapy as needed to lumbar region and core stabilization in neutral      Assessment: [] Progressing toward goals. [x] No change. [] Other:    [x] Patient would continue to benefit from skilled physical therapy services in order to: modulate pain, improve mobility and lumbopelvic stability    : Pt continues to struggle with constant moderate to high levels of back pain with difficulty finding any positional or interventional relief today. Did not tolerate prone press ups well and seemed to respond best to gentle stretching and sidelying activities.  Initial HEP provided today for flexibility and basic core stabilization with sx to be reassessed next visit. STG: (to be met in 4 treatments)  1. Pt will self report worst pain no greater than 4/10 in order to better tolerate ADLs/work activities with minimal dysfunction  2. Pt will improve AROM in lumbar spine to 80% or greater in all planes in order to demonstrate ability to move/reach in all planes unrestricted at PLOF  3. Pt will self report ability to tolerate 20 min of standing or greater to better tolerate household ADLs and community errands with minimal dysfunction  LTG: (to be met in 8 treatments)  1. Pt will demonstrate ability to maintain neutral lumbar spine with B straight leg lowering test to 45 deg or less to demonstrate improved support/stability to spine  2. Pt will decrease ROMMEL to 20% impaired or less in order to demonstrate improved functional tolerances at PLOF with minimal restriction/dysfunction  3. Pt will demonstrate independence with a long term HEP for continued progress/maintenance after completion of PT    Pt. Education:  [x] Yes  [] No  [] Reviewed Prior HEP/Ed  Method of Education: [x] Verbal  [x] Demo  [x] Written  Comprehension of Education:  [x] Verbalizes understanding. [x] Demonstrates understanding. [] Needs review. [] Demonstrates/verbalizes HEP/Ed previously given. Plan: [x] Continue per plan of care.    [] Other:      Treatment Charges: Mins Units   []  Modalities     [x]  Ther Exercise 20 1   [x]  Manual Therapy 10 1   []  Ther Activities     []  Aquatics     [x]  Neuromuscular 10 1   [] Vasocompression     [] Gait Training     [] Dry needling        [] 1 or 2 muscles        [] 3 or more muscles     []  Other     Total Treatment time 40 3     Time In: 9:30am            Time Out: 10:10am    Electronically signed by:  Sneha Lyon PT

## 2021-01-25 ENCOUNTER — HOSPITAL ENCOUNTER (OUTPATIENT)
Dept: PHYSICAL THERAPY | Age: 36
Setting detail: THERAPIES SERIES
Discharge: HOME | End: 2021-01-25
Payer: COMMERCIAL

## 2021-01-25 PROCEDURE — 97112 NEUROMUSCULAR REEDUCATION: CPT

## 2021-01-25 PROCEDURE — 97140 MANUAL THERAPY 1/> REGIONS: CPT

## 2021-01-25 PROCEDURE — 97110 THERAPEUTIC EXERCISES: CPT

## 2021-01-25 NOTE — FLOWSHEET NOTE
509 [facility] Outpatient Physical Therapy   [address]  [facility] [address]   Phone: [phone]   Fax: [phone]    Physical Therapy Daily Treatment Note      Date:  2021  Patient Name:  Kenny Lizama    :  1985  MRN: [id number]  Physician: Jad Masterson MD     Insurance: [employer] 30 visits  Diagnosis:   M54.5, G89.29 (ICD-10-CM) - Chronic midline low back pain without sciatica   M54.2 (ICD-10-CM) - Neck pain     Onset Date:    Next Dr. Kathya Luz: None scheduled. Pending insurance authorization for injections  Visit# / total visits: 3/8  Cancels/No Shows: 0/0    Subjective:    Pain:  [x] Yes  [] No Location: Low back Pain Rating: (0-10 scale) 6/10  Pain altered Tx:  [] No  [] Yes  Action:  Comments: : Pt states that he's doing about the same and is still having a lot of tension in his lower back. Has been seeing the chiropractor in addition to PT and does not feel any relief from chiropractic sessions. Objective:  Modalities:   Precautions: None  Exercises:  Exercise Reps/ Time Weight/ Level Comments Performed=x   Prone press ups 10 reps  Attempted in pain free range but not able to tolerate    Cat/cows 2'' hold x 15 reps   x   Child's pose 30'' x 5   x   Lower trunk rotations 20 reps   x   SKTC 30'' x 3  R LE; towel behind knee    Seated figure 4 30'' x 3  R LE x   Dynamic HS stretch in 90-90 2'' hold x 15  R LE    Supine marching (2 up-2 down) with TrA 15 reps x 2   x   Clams 20 reps x 2  R/L x   Bird dogs 20 reps x 2  2nd set LEs only x                        Other: Manual therapy x 10 min  -STM to B lumbar paraspinals (R>L)  -Grade II/III PA glides L1-L4    Specific Instructions for next treatment: Manual therapy as needed to lumbar region and core stabilization in neutral      Assessment: [] Progressing toward goals. [x] No change.      [] Other:    [x] Patient would continue to benefit from skilled physical therapy services in order to: modulate pain, improve mobility and lumbopelvic stability    1/25: Unchanged in lower back pain/tightness from previous visit but pt reports only 1 occurrence of peripheral R LE pain since previous visit. Continued to emphasize gentle mobility and lumbar stabilization in neutral. Modified bird dogs to LEs only due to increased tightness after first set. Additional skilled intervention is required. STG: (to be met in 4 treatments)  1. Pt will self report worst pain no greater than 4/10 in order to better tolerate ADLs/work activities with minimal dysfunction  2. Pt will improve AROM in lumbar spine to 80% or greater in all planes in order to demonstrate ability to move/reach in all planes unrestricted at PLOF  3. Pt will self report ability to tolerate 20 min of standing or greater to better tolerate household ADLs and community errands with minimal dysfunction  LTG: (to be met in 8 treatments)  1. Pt will demonstrate ability to maintain neutral lumbar spine with B straight leg lowering test to 45 deg or less to demonstrate improved support/stability to spine  2. Pt will decrease ROMMEL to 20% impaired or less in order to demonstrate improved functional tolerances at PLOF with minimal restriction/dysfunction  3. Pt will demonstrate independence with a long term HEP for continued progress/maintenance after completion of PT    Pt. Education:  [x] Yes  [] No  [] Reviewed Prior HEP/Ed  Method of Education: [x] Verbal  [x] Demo  [x] Written  Comprehension of Education:  [x] Verbalizes understanding. [x] Demonstrates understanding. [] Needs review. [] Demonstrates/verbalizes HEP/Ed previously given. Plan: [x] Continue per plan of care.    [] Other:      Treatment Charges: Mins Units   []  Modalities     [x]  Ther Exercise 20 1   [x]  Manual Therapy 10 1   []  Ther Activities     []  Aquatics     [x]  Neuromuscular 10 1   [] Vasocompression     [] Gait Training     [] Dry needling        [] 1 or 2 muscles        [] 3 or more muscles     []  Other Total Treatment time 40 3     Time In: 10:00am            Time Out: 10:40am    Electronically signed by:  Sivan Quijano, PT

## 2021-01-28 ENCOUNTER — APPOINTMENT (OUTPATIENT)
Dept: PHYSICAL THERAPY | Age: 36
End: 2021-01-28
Payer: COMMERCIAL

## 2021-02-01 ENCOUNTER — HOSPITAL ENCOUNTER (OUTPATIENT)
Dept: PHYSICAL THERAPY | Age: 36
Setting detail: THERAPIES SERIES
Discharge: HOME | End: 2021-02-01
Payer: COMMERCIAL

## 2021-02-01 PROCEDURE — 97140 MANUAL THERAPY 1/> REGIONS: CPT

## 2021-02-01 PROCEDURE — 97112 NEUROMUSCULAR REEDUCATION: CPT

## 2021-02-01 PROCEDURE — 97110 THERAPEUTIC EXERCISES: CPT
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
## 2021-02-04 ENCOUNTER — HOSPITAL ENCOUNTER (OUTPATIENT)
Dept: PHYSICAL THERAPY | Age: 36
Setting detail: THERAPIES SERIES
Discharge: HOME | End: 2021-02-04
Payer: COMMERCIAL

## 2021-02-04 PROCEDURE — 97110 THERAPEUTIC EXERCISES: CPT

## 2021-02-04 PROCEDURE — 97112 NEUROMUSCULAR REEDUCATION: CPT

## 2021-02-04 PROCEDURE — 97140 MANUAL THERAPY 1/> REGIONS: CPT

## 2021-02-04 NOTE — FLOWSHEET NOTE
509 [facility]   [address] [facility] [address]   Phone: [phone]   Fax: [phone]    Physical Therapy Daily Treatment Note      Date:  2021  Patient Name:  Paul Aguilar    :  1985  MRN: [id number]  Physician: Stuart Dow MD     Insurance: [employer] 30 visits  Diagnosis:   M54.5, G89.29 (ICD-10-CM) - Chronic midline low back pain without sciatica   M54.2 (ICD-10-CM) - Neck pain     Onset Date:    Next Dr. Caryn Malcolm: None scheduled. Pending insurance authorization for injections  Visit# / total visits:   Cancels/No Shows: 0/0    Subjective:    Pain:  [x] Yes  [] No Location: Low back Pain Rating: (0-10 scale) 7/10  Pain altered Tx:  [] No  [] Yes  Action:  Comments: : Pt reports more pain and stiffness than usual which he attributes to not sleeping well. Also states that he slipped on ice the other day and tweaked his back trying to prevent a fall.       Objective:  Modalities:   Precautions: None  Exercises:  Exercise Reps/ Time Weight/ Level Comments Performed=x   Prone press ups 10 reps  Attempted in pain free range but not able to tolerate    Cat/cows 2'' hold x 20 reps      Child's pose 30'' x 5   x   Lower trunk rotations 20 reps  LEs on red swiss ball x   SKTC 30'' x 3  R LE; towel behind knee    Seated figure 4 30'' x 3  R LE    Dynamic HS stretch in 90-90 2'' hold x 15  R/L each x   Dead bugs (LE component only) 10 reps x 2   Alternating heel taps with TrA activation    Clams 20 reps x 2  R/L    Modified side plank 30'' x 2  Knees; R/L each x   Bird dogs 20 reps x 2  2nd set LEs only    Good mornings 15 reps x 2  Wooden dowel behind shoulders; pain free range    Sidelying book openers 15 reps x 2   x          Other: Manual therapy x 10 min  -STM to B lumbar paraspinals (R>L)  -Grade II/III PA glides L1-L4     Specific Instructions for next treatment: Manual therapy as needed to lumbar region and core stabilization in neutral      Assessment: [] [] Gait Training     [] Dry needling        [] 1 or 2 muscles        [] 3 or more muscles     []  Other     Total Treatment time 40 3     Time In: 10:00am            Time Out: 10:40am    Electronically signed by:  José Ford PT

## 2021-02-08 ENCOUNTER — HOSPITAL ENCOUNTER (OUTPATIENT)
Dept: PHYSICAL THERAPY | Age: 36
Setting detail: THERAPIES SERIES
Discharge: HOME | End: 2021-02-08
Payer: COMMERCIAL

## 2021-02-08 PROCEDURE — 97110 THERAPEUTIC EXERCISES: CPT

## 2021-02-08 PROCEDURE — 97140 MANUAL THERAPY 1/> REGIONS: CPT

## 2021-02-08 PROCEDURE — 97112 NEUROMUSCULAR REEDUCATION: CPT

## 2021-02-08 NOTE — FLOWSHEET NOTE
114 [facility]   [address] [facility] [address]   Phone: [phone]   Fax: [phone]    Physical Therapy Daily Treatment Note      Date:  2021  Patient Name:  Damaris Huntley    :  1985  MRN: [id number]  Physician: Edin Barth MD     Insurance: [employer] 30 visits  Diagnosis:   M54.5, G89.29 (ICD-10-CM) - Chronic midline low back pain without sciatica   M54.2 (ICD-10-CM) - Neck pain     Onset Date:    Next Dr. Maribel Crow: None scheduled. Pending insurance authorization for injections  Visit# / total visits:   Cancels/No Shows: 0/0    Subjective:    Pain:  [x] Yes  [] No Location: Low back Pain Rating: (0-10 scale) 7/10  Pain altered Tx:  [] No  [] Yes  Action:  Comments: : Pt states that he's noticed more \"stimulation\" in the back of his R hip/posterior thigh today and describes sensation almost as pins and needles. 7/10 discomfort at start of today's visit and states that these sx began this morning when he first woke up.     Objective:  Modalities:   Precautions: None  Exercises:  Exercise Reps/ Time Weight/ Level Comments Performed=x   Prone press ups 10 reps  Attempted in pain free range but not able to tolerate    Cat/cows 2'' hold x 20 reps      Child's pose 30'' x 5   x   Lower trunk rotations 20 reps  LEs on red swiss ball x   Single knee to opposite chest stretch 30'' x 3  R LE x   Seated figure 4 30'' x 3  R LE    Dynamic HS stretch in 90-90 2'' hold x 20  R LE x   Dead bugs (LE component only) 10 reps x 2   Alternating heel taps with TrA activation x   Clams 20 reps x 2  R/L    Modified side plank 30'' x 2  Knees; R/L each    Bird dogs 20 reps x 2  2nd set LEs only    Good mornings 15 reps x 2  Wooden dowel behind shoulders; pain free range    Sidelying book openers 15 reps x 2   x   Banded pallof presses 20 reps x 2 Blue tubing (doubled) R/L each x   Mini squats with banded isometric pallof 10 reps x 1 Blue tubing (doubled) R/L x Other: Manual therapy x 10 min  -STM to R lumbar paraspinals, glute max and QL  -Grade II/III GRACIE pro L1-L4     Specific Instructions for next treatment: Manual therapy as needed to lumbar region and core stabilization in neutral      Assessment: [] Progressing toward goals. [x] No change. [] Other:    [x] Patient would continue to benefit from skilled physical therapy services in order to: modulate pain, improve mobility and lumbopelvic stability    2/8: More peripheral R LE pain at start of today's visit, in R buttock/posterior thigh region. Sx were centralized post manual therapy and mobility focused interventions but no change in overall pain levels. Continued to emphasize gentle core stabilization in multiple positions but continues to struggle with pain through all interventions. Will likely require follow up for additional medical mgmt after completion of this course of PT.    STG: (to be met in 4 treatments)  1. Pt will self report worst pain no greater than 4/10 in order to better tolerate ADLs/work activities with minimal dysfunction  2. Pt will improve AROM in lumbar spine to 80% or greater in all planes in order to demonstrate ability to move/reach in all planes unrestricted at PLOF  3. Pt will self report ability to tolerate 20 min of standing or greater to better tolerate household ADLs and community errands with minimal dysfunction  LTG: (to be met in 8 treatments)  1. Pt will demonstrate ability to maintain neutral lumbar spine with B straight leg lowering test to 45 deg or less to demonstrate improved support/stability to spine  2. Pt will decrease ROMMEL to 20% impaired or less in order to demonstrate improved functional tolerances at PLOF with minimal restriction/dysfunction  3. Pt will demonstrate independence with a long term HEP for continued progress/maintenance after completion of PT    Pt.  Education:  [x] Yes  [] No  [] Reviewed Prior HEP/Ed  Method of Education: [x] Verbal  [x] Demo [x] Written  Comprehension of Education:  [x] Verbalizes understanding. [x] Demonstrates understanding. [] Needs review. [] Demonstrates/verbalizes HEP/Ed previously given. Plan: [x] Continue per plan of care.    [] Other:      Treatment Charges: Mins Units   []  Modalities     [x]  Ther Exercise 15 1   [x]  Manual Therapy 10 1   []  Ther Activities     []  Aquatics     [x]  Neuromuscular 15 1   [] Vasocompression     [] Gait Training     [] Dry needling        [] 1 or 2 muscles        [] 3 or more muscles     []  Other     Total Treatment time 40 3     Time In: 9:45am            Time Out: 10:25am    Electronically signed by:  Angi Antoine PT

## 2021-02-11 ENCOUNTER — HOSPITAL ENCOUNTER (OUTPATIENT)
Dept: PHYSICAL THERAPY | Age: 36
Setting detail: THERAPIES SERIES
Discharge: HOME | End: 2021-02-11
Payer: COMMERCIAL

## 2021-02-11 NOTE — FLOWSHEET NOTE
[] [facility]) - [facility]  Therapy  [address]    [phone]  F: [phone]   [] [address] [facility] [address]   [address]  P: [phone]  F: [phone]  [] [address] [facility]  Therapy  [address] [facility]  P: [phone]  F: [phone] [] [address]  P: [phone]  F: [phone]  [] [address]  [facility]   [address]   [place]: [phone]  F: [phone]   [x] [place] [address] [facility] [address]  [place]: [phone]   F: [phone]     Physical Therapy Cancel/No Show note    Date: 2021  Patient: Marco A Diaz  : 1985  MRN: [id number]    Cancels/No Shows to date:     For today's appointment patient:    [x]  Cancelled    [] Rescheduled appointment    [] No-show     Reason given by patient:    [x]  Patient ill (migraine)    []  Conflicting appointment    [] No transportation      [] Conflict with work    [] No reason given    [] Weather related    [] COVID-19    [] Other:      Comments:        [x] Next appointment was confirmed    Electronically signed by: Anthony Noriega PT

## 2021-02-15 ENCOUNTER — HOSPITAL ENCOUNTER (OUTPATIENT)
Dept: PHYSICAL THERAPY | Age: 36
Setting detail: THERAPIES SERIES
Discharge: HOME | End: 2021-02-15
Payer: COMMERCIAL

## 2021-02-15 PROCEDURE — 97110 THERAPEUTIC EXERCISES: CPT

## 2021-02-15 PROCEDURE — 97124 MASSAGE THERAPY: CPT

## 2021-02-15 PROCEDURE — 97112 NEUROMUSCULAR REEDUCATION: CPT

## 2021-02-15 PROCEDURE — 97140 MANUAL THERAPY 1/> REGIONS: CPT

## 2021-02-15 NOTE — FLOWSHEET NOTE
[address] [facility]   [address] [facility] [address]   Phone: [phone]   Fax: [phone]    Physical Therapy Daily Treatment Note      Date:  2/15/2021  Patient Name:  Anamika Salcido    :  1985  MRN: [id number]  Physician: Sridhar Wesley MD     Insurance: [employer] 30 visits  Diagnosis:   M54.5, G89.29 (ICD-10-CM) - Chronic midline low back pain without sciatica   M54.2 (ICD-10-CM) - Neck pain     Onset Date:    Next Dr. Dilcia Crow: None scheduled. Pending insurance authorization for injections  Visit# / total visits: 7/8  Cancels/No Shows: 1/0    Subjective:    Pain:  [x] Yes  [] No Location: Low back Pain Rating: (0-10 scale) 7/10  Pain altered Tx:  [] No  [] Yes  Action:  Comments: 2/15: Pt states that he's been struggling with a bad migraine over the past 24 hours and almost canceled today today due to pain. 7/10 pain currently in the R side of his neck and B sides of his lower back, and states that his neck is currently more painful than his back.      Objective:  Modalities:   Precautions: None  Exercises:  Exercise Reps/ Time Weight/ Level Comments Performed=x   Prone press ups 10 reps  Attempted in pain free range but not able to tolerate    Cat/cows 2'' hold x 15 reps   x   Child's pose 30'' x 5   x   Lower trunk rotations 20 reps  LEs on red swiss ball x   Single knee to opposite chest stretch 30'' x 3  R LE    Seated figure 4 30'' x 3  R LE    Dynamic HS stretch in 90-90 2'' hold x 20  R LE    Dead bugs (LE component only) 10 reps x 2   Alternating heel taps with TrA activation x   Clams 20 reps x 2  R/L    Modified side plank 30'' x 2  Knees; R/L each    Bird dogs 12 reps x 2   x   Good mornings 15 reps x 2  Wooden dowel behind shoulders; pain free range    Sidelying book openers 15 reps x 2      Banded pallof presses 20 reps x 2 Blue tubing (doubled) R/L each x   Mini squats with banded isometric pallof 10 reps x 1 Blue tubing (doubled) R/L x   Modified single leg RDLs (2 point) 8 reps R/L  One knee supported on table x          Other: Manual therapy x 15 min  -STM to B lumbar paraspinals (R>L), R suboccipitals and levator scap  -Grade II/III PA glides L1-L4     Specific Instructions for next treatment: Manual therapy as needed to lumbar region and core stabilization in neutral      Assessment: [] Progressing toward goals. [x] No change. [] Other:    [x] Patient would continue to benefit from skilled physical therapy services in order to: modulate pain, improve mobility and lumbopelvic stability    2/15: Revisited manual intervention to cervical region today as this was pt's primary complaint at the start of today's visit. Notable dysfunction tone & trigger points at R suboccipital region which improved significantly post tx. Improving core control with all lumbar stabilization activities but sx in lower back did not change post-session today. Progress due at next visit for formal re-evaluation of all goals. STG: (to be met in 4 treatments)  1. Pt will self report worst pain no greater than 4/10 in order to better tolerate ADLs/work activities with minimal dysfunction  2. Pt will improve AROM in lumbar spine to 80% or greater in all planes in order to demonstrate ability to move/reach in all planes unrestricted at PLOF  3. Pt will self report ability to tolerate 20 min of standing or greater to better tolerate household ADLs and community errands with minimal dysfunction  LTG: (to be met in 8 treatments)  1. Pt will demonstrate ability to maintain neutral lumbar spine with B straight leg lowering test to 45 deg or less to demonstrate improved support/stability to spine  2. Pt will decrease ROMMEL to 20% impaired or less in order to demonstrate improved functional tolerances at PLOF with minimal restriction/dysfunction  3. Pt will demonstrate independence with a long term HEP for continued progress/maintenance after completion of PT    Pt.  Education:  [x] Yes  [] No [] Reviewed Prior HEP/Ed  Method of Education: [x] Verbal  [x] Demo  [x] Written  Comprehension of Education:  [x] Verbalizes understanding. [x] Demonstrates understanding. [] Needs review. [] Demonstrates/verbalizes HEP/Ed previously given. Plan: [x] Continue per plan of care.    [x] Other: Progress note due at next visit      Treatment Charges: Mins Units   []  Modalities     [x]  Ther Exercise 10 1   [x]  Manual Therapy 15 1   []  Ther Activities     []  Aquatics     [x]  Neuromuscular 18 1   [] Vasocompression     [] Gait Training     [] Dry needling        [] 1 or 2 muscles        [] 3 or more muscles     []  Other     Total Treatment time 40 3     Time In: 9:58am            Time Out: 10:43am    Electronically signed by:  Say Mendoza PT

## 2021-02-18 ENCOUNTER — HOSPITAL ENCOUNTER (OUTPATIENT)
Dept: PHYSICAL THERAPY | Age: 36
Setting detail: THERAPIES SERIES
Discharge: HOME | End: 2021-02-18
Payer: COMMERCIAL

## 2021-02-18 PROCEDURE — 97110 THERAPEUTIC EXERCISES: CPT

## 2021-02-18 NOTE — DISCHARGE SUMMARY
509 [facility]   [address]   Phone: [phone]   Fax: [phone]    Physical Therapy Progress Note and Discharge Summary      Date:  2021  Patient Name:  Paul Aguilar    :  1985  MRN: [id number]  Physician: Stuart Dow MD     Insurance: [employer] 30 visits  Diagnosis:   M54.5, G89.29 (ICD-10-CM) - Chronic midline low back pain without sciatica   M54.2 (ICD-10-CM) - Neck pain     Onset Date:    Next Dr. Caryn Malcolm: None scheduled. Pending insurance authorization for injections  Visit# / total visits:   Cancels/No Shows: 10    Subjective:    Pain:  [x] Yes  [] No Location: Low back Pain Rating: (0-10 scale) 8/10  Pain altered Tx:  [] No  [] Yes  Action:  Comments: : Pt states that his neck has improved through the course of PT but has not noticed any change in his chronic back pain. States that pain is constant and actually higher than usual at 8/10 which he attributes to having to shovel snow. Has not been able to identify any positional or therapeutic relief in back pain, even temporary. Is pending another cervical injection but states that \"no one seems to know what to do with my back. \" Progress note performed today for reporting period from 21-21.     NDI score (): 58% impaired  ROMMEL score (): 58% impaired    Objective:      Range of Motion  Left Range of Motion  Right Strength  Left Strength  Right   Cervical Flexion 100% 100%     Cervical Extension 100% 100%     Cervical Rotation 100% 100%     Cervical Side Bend 90% 90%     Shoulder Flexion   5/5 5/5   Shoulder Abduction   5/5 5/5   Shoulder Extension       Shoulder ER   5/5 5/5   Shoulder IR   5/5 5/5   Elbow Flexion   5/5 5/5   Elbow Extension   5/5 5/5   Pronation       Supination       Wrist Flexion       Wrist Extension           Range of Motion  Left Range of Motion  Right Strength  Left Strength  Right   Lumbar Flexion 50% 50%     Lumbar Extension 50% 50% seen for 8 PT visits to date for his chronic lower back pain (and continued tx of neck pain/HAs). Overall has demonstrated good progress with regard to his neck pain, but sx and AROM have been unchanged in lumbar spine despite 4 weeks of treatment including manual therapy, corrective exercise and core stabilization. Continues to be limited and painful in all planes with intermittent radiculopathy into R LE (with no consistent pattern). At this time, all subjective & objective measures have been unchanged and additional skilled PT is not warranted due to lack of progress to date. At this time, reinforced long term written HEP for continued maintenance but recommended MD follow up for additional medical mgmt. Good understanding noted and pt will be discharged at this time. STG: (to be met in 4 treatments)  1. Pt will self report worst pain no greater than 4/10 in order to better tolerate ADLs/work activities with minimal dysfunction GOAL DISCONTINUED (PLATEAU OF PROGRESS) 2/18  2. Pt will improve AROM in lumbar spine to 80% or greater in all planes in order to demonstrate ability to move/reach in all planes unrestricted at [address]) 2/18  3. Pt will self report ability to tolerate 20 min of standing or greater to better tolerate household ADLs and community errands with minimal dysfunction GOAL DISCONTINUED (PLATEAU OF PROGRESS) 2/18  LTG: (to be met in 8 treatments)  1. Pt will demonstrate ability to maintain neutral lumbar spine with B straight leg lowering test to 45 deg or less to demonstrate improved support/stability to spine  GOAL DISCONTINUED (PLATEAU OF PROGRESS) 2/18  2. Pt will decrease ROMMEL to 20% impaired or less in order to demonstrate improved functional tolerances at PLOF with minimal restriction/dysfunction GOAL DISCONTINUED (PLATEAU OF PROGRESS) 2/18  3.  Pt will demonstrate independence with a long term HEP for continued progress/maintenance after completion of PT

## 2022-04-07 PROBLEM — M54.9 BACKACHE: Status: ACTIVE | Noted: 2022-04-07

## 2022-06-28 RX ORDER — DULOXETIN HYDROCHLORIDE 30 MG/1
1 CAPSULE, DELAYED RELEASE ORAL
COMMUNITY

## 2022-06-28 RX ORDER — METHOCARBAMOL 500 MG/1
TABLET, FILM COATED ORAL
COMMUNITY

## 2024-01-31 ENCOUNTER — HOSPITAL ENCOUNTER (EMERGENCY)
Age: 39
Discharge: HOME | End: 2024-01-31
Attending: EMERGENCY MEDICINE
Payer: MEDICARE

## 2024-01-31 VITALS
OXYGEN SATURATION: 97 % | TEMPERATURE: 97.4 F | HEIGHT: 73 IN | DIASTOLIC BLOOD PRESSURE: 111 MMHG | HEART RATE: 63 BPM | BODY MASS INDEX: 37.11 KG/M2 | SYSTOLIC BLOOD PRESSURE: 154 MMHG | WEIGHT: 280 LBS | RESPIRATION RATE: 20 BRPM

## 2024-01-31 DIAGNOSIS — G43.101 MIGRAINE WITH AURA AND WITH STATUS MIGRAINOSUS, NOT INTRACTABLE: Primary | ICD-10-CM

## 2024-01-31 LAB
ANION GAP SERPL CALCULATED.3IONS-SCNC: 10 MMOL/L (ref 9–17)
BASOPHILS # BLD: 0.06 K/UL (ref 0–0.2)
BASOPHILS NFR BLD: 1 % (ref 0–2)
BUN SERPL-MCNC: 9 MG/DL (ref 6–20)
BUN/CREAT SERPL: 11 (ref 9–20)
CALCIUM SERPL-MCNC: 9.6 MG/DL (ref 8.6–10.4)
CHLORIDE SERPL-SCNC: 103 MMOL/L (ref 98–107)
CO2 SERPL-SCNC: 25 MMOL/L (ref 20–31)
CREAT SERPL-MCNC: 0.8 MG/DL (ref 0.7–1.2)
CRP SERPL HS-MCNC: 4 MG/L (ref 0–5)
EOSINOPHIL # BLD: 0.32 K/UL (ref 0–0.44)
EOSINOPHILS RELATIVE PERCENT: 4 % (ref 1–4)
ERYTHROCYTE [DISTWIDTH] IN BLOOD BY AUTOMATED COUNT: 12.2 % (ref 11.8–14.4)
GFR SERPL CREATININE-BSD FRML MDRD: >60 ML/MIN/1.73M2
GLUCOSE SERPL-MCNC: 91 MG/DL (ref 70–99)
HCT VFR BLD AUTO: 48.3 % (ref 40.7–50.3)
HGB BLD-MCNC: 15.8 G/DL (ref 13–17)
IMM GRANULOCYTES # BLD AUTO: <0.03 K/UL (ref 0–0.3)
IMM GRANULOCYTES NFR BLD: 0 %
LYMPHOCYTES NFR BLD: 2.74 K/UL (ref 1.1–3.7)
LYMPHOCYTES RELATIVE PERCENT: 33 % (ref 24–43)
MCH RBC QN AUTO: 27.5 PG (ref 25.2–33.5)
MCHC RBC AUTO-ENTMCNC: 32.7 G/DL (ref 28.4–34.8)
MCV RBC AUTO: 84.1 FL (ref 82.6–102.9)
MONOCYTES NFR BLD: 0.82 K/UL (ref 0.1–1.2)
MONOCYTES NFR BLD: 10 % (ref 3–12)
NEUTROPHILS NFR BLD: 52 % (ref 36–65)
NEUTS SEG NFR BLD: 4.43 K/UL (ref 1.5–8.1)
NRBC BLD-RTO: 0 PER 100 WBC
PLATELET # BLD AUTO: 412 K/UL (ref 138–453)
PMV BLD AUTO: 10.1 FL (ref 8.1–13.5)
POTASSIUM SERPL-SCNC: 4 MMOL/L (ref 3.7–5.3)
RBC # BLD AUTO: 5.74 M/UL (ref 4.21–5.77)
SODIUM SERPL-SCNC: 138 MMOL/L (ref 135–144)
WBC OTHER # BLD: 8.4 K/UL (ref 3.5–11.3)

## 2024-01-31 PROCEDURE — 96375 TX/PRO/DX INJ NEW DRUG ADDON: CPT

## 2024-01-31 PROCEDURE — 99284 EMERGENCY DEPT VISIT MOD MDM: CPT

## 2024-01-31 PROCEDURE — 85025 COMPLETE CBC W/AUTO DIFF WBC: CPT

## 2024-01-31 PROCEDURE — 86140 C-REACTIVE PROTEIN: CPT

## 2024-01-31 PROCEDURE — 36415 COLL VENOUS BLD VENIPUNCTURE: CPT

## 2024-01-31 PROCEDURE — 80048 BASIC METABOLIC PNL TOTAL CA: CPT

## 2024-01-31 PROCEDURE — 96374 THER/PROPH/DIAG INJ IV PUSH: CPT

## 2024-01-31 PROCEDURE — 6360000002 HC RX W HCPCS: Performed by: EMERGENCY MEDICINE

## 2024-01-31 PROCEDURE — 2580000003 HC RX 258: Performed by: EMERGENCY MEDICINE

## 2024-01-31 RX ORDER — 0.9 % SODIUM CHLORIDE 0.9 %
1000 INTRAVENOUS SOLUTION INTRAVENOUS ONCE
Status: COMPLETED | OUTPATIENT
Start: 2024-01-31 | End: 2024-01-31

## 2024-01-31 RX ORDER — KETOROLAC TROMETHAMINE 30 MG/ML
30 INJECTION, SOLUTION INTRAMUSCULAR; INTRAVENOUS ONCE
Status: COMPLETED | OUTPATIENT
Start: 2024-01-31 | End: 2024-01-31

## 2024-01-31 RX ORDER — ONDANSETRON 2 MG/ML
4 INJECTION INTRAMUSCULAR; INTRAVENOUS ONCE
Status: COMPLETED | OUTPATIENT
Start: 2024-01-31 | End: 2024-01-31

## 2024-01-31 RX ADMIN — SODIUM CHLORIDE 1000 ML: 9 INJECTION, SOLUTION INTRAVENOUS at 17:05

## 2024-01-31 RX ADMIN — ONDANSETRON 4 MG: 2 INJECTION INTRAMUSCULAR; INTRAVENOUS at 16:54

## 2024-01-31 RX ADMIN — KETOROLAC TROMETHAMINE 30 MG: 30 INJECTION, SOLUTION INTRAMUSCULAR; INTRAVENOUS at 16:54

## 2024-01-31 ASSESSMENT — PAIN SCALES - GENERAL
PAINLEVEL_OUTOF10: 9
PAINLEVEL_OUTOF10: 5
PAINLEVEL_OUTOF10: 9
PAINLEVEL_OUTOF10: 1

## 2024-01-31 ASSESSMENT — LIFESTYLE VARIABLES
HOW MANY STANDARD DRINKS CONTAINING ALCOHOL DO YOU HAVE ON A TYPICAL DAY: 1 OR 2
HOW OFTEN DO YOU HAVE A DRINK CONTAINING ALCOHOL: MONTHLY OR LESS

## 2024-01-31 ASSESSMENT — ENCOUNTER SYMPTOMS
BACK PAIN: 0
SORE THROAT: 0
SHORTNESS OF BREATH: 0
ABDOMINAL DISTENTION: 0

## 2024-01-31 ASSESSMENT — PAIN DESCRIPTION - DESCRIPTORS
DESCRIPTORS: ACHING
DESCRIPTORS: THROBBING
DESCRIPTORS: ACHING

## 2024-01-31 ASSESSMENT — PAIN DESCRIPTION - LOCATION
LOCATION: HEAD
LOCATION: HEAD
LOCATION: HEAD;EYE
LOCATION: HEAD

## 2024-01-31 ASSESSMENT — PAIN DESCRIPTION - ORIENTATION: ORIENTATION: RIGHT;LEFT;ANTERIOR

## 2024-01-31 ASSESSMENT — PAIN - FUNCTIONAL ASSESSMENT: PAIN_FUNCTIONAL_ASSESSMENT: 0-10

## 2024-01-31 ASSESSMENT — PAIN DESCRIPTION - FREQUENCY: FREQUENCY: CONTINUOUS

## 2024-01-31 ASSESSMENT — PAIN DESCRIPTION - PAIN TYPE: TYPE: ACUTE PAIN

## 2024-01-31 NOTE — DISCHARGE INSTRUCTIONS
Get plenty of sleep and plenty of rest.  Drink plenty of fluids.  Follow-up with your doctor next week.  Return if symptoms get worse.

## 2024-01-31 NOTE — ED PROVIDER NOTES
Co-signs this chart in the absence of a cardiologist.        RADIOLOGY:   Non-plain film images such as CT, Ultrasound and MRI are read by the radiologist. Plain radiographic images are visualized and preliminarily interpreted by the emergency physician with the below findings:        Interpretation per the Radiologist below, if available at the time of this note:    No orders to display         ED BEDSIDE ULTRASOUND:   Performed by ED Physician - none    LABS:  Labs Reviewed   CBC WITH AUTO DIFFERENTIAL   BASIC METABOLIC PANEL   C-REACTIVE PROTEIN       All other labs were within normal range or not returned as of this dictation.    EMERGENCY DEPARTMENT COURSE and DIFFERENTIAL DIAGNOSIS/MDM:   Vitals:    Vitals:    01/31/24 1614   BP: (!) 154/111   Pulse: 63   Resp: 20   Temp: 97.4 °F (36.3 °C)   TempSrc: Tympanic   SpO2: 97%   Weight: 127 kg (280 lb)   Height: 1.854 m (6' 1\")         MDM     Amount and/or Complexity of Data Reviewed  Clinical lab tests: ordered and reviewed  Tests in the medicine section of CPT®: reviewed and ordered  Decide to obtain previous medical records or to obtain history from someone other than the patient: yes  Review and summarize past medical records: yes  Independent visualization of images, tracings, or specimens: yes    Risk of Complications, Morbidity, and/or Mortality  Presenting problems: moderate  Diagnostic procedures: moderate  Management options: moderate    Patient Progress  Patient progress: stable          REASSESSMENT     ED Course as of 01/31/24 1756 Wed Jan 31, 2024   1755 Patient is feeling a lot better.  His headache is down to a 1 out of 10.  He feels comfortable being discharged home.  He will follow-up with his doctor as needed. [JI]      ED Course User Index  [JI] Claudine Beltran DO           FINAL IMPRESSION      1. Migraine with aura and with status migrainosus, not intractable          DISPOSITION/PLAN   DISPOSITION Decision To Discharge 01/31/2024 05:55:49

## 2024-08-07 ENCOUNTER — HOSPITAL ENCOUNTER (EMERGENCY)
Age: 39
Discharge: HOME | End: 2024-08-07
Payer: MEDICARE

## 2024-08-07 VITALS
TEMPERATURE: 97.7 F | WEIGHT: 275 LBS | HEIGHT: 73 IN | HEART RATE: 68 BPM | RESPIRATION RATE: 16 BRPM | BODY MASS INDEX: 36.45 KG/M2 | DIASTOLIC BLOOD PRESSURE: 99 MMHG | SYSTOLIC BLOOD PRESSURE: 147 MMHG | OXYGEN SATURATION: 97 %

## 2024-08-07 DIAGNOSIS — G43.901 MIGRAINE WITH STATUS MIGRAINOSUS, NOT INTRACTABLE, UNSPECIFIED MIGRAINE TYPE: Primary | ICD-10-CM

## 2024-08-07 PROCEDURE — 96374 THER/PROPH/DIAG INJ IV PUSH: CPT

## 2024-08-07 PROCEDURE — 2580000003 HC RX 258: Performed by: PHYSICIAN ASSISTANT

## 2024-08-07 PROCEDURE — 99284 EMERGENCY DEPT VISIT MOD MDM: CPT

## 2024-08-07 PROCEDURE — 6360000002 HC RX W HCPCS: Performed by: PHYSICIAN ASSISTANT

## 2024-08-07 PROCEDURE — 96375 TX/PRO/DX INJ NEW DRUG ADDON: CPT

## 2024-08-07 RX ORDER — METOCLOPRAMIDE HYDROCHLORIDE 5 MG/ML
10 INJECTION INTRAMUSCULAR; INTRAVENOUS ONCE
Status: COMPLETED | OUTPATIENT
Start: 2024-08-07 | End: 2024-08-07

## 2024-08-07 RX ORDER — KETOROLAC TROMETHAMINE 30 MG/ML
30 INJECTION, SOLUTION INTRAMUSCULAR; INTRAVENOUS ONCE
Status: COMPLETED | OUTPATIENT
Start: 2024-08-07 | End: 2024-08-07

## 2024-08-07 RX ORDER — 0.9 % SODIUM CHLORIDE 0.9 %
1000 INTRAVENOUS SOLUTION INTRAVENOUS ONCE
Status: COMPLETED | OUTPATIENT
Start: 2024-08-07 | End: 2024-08-07

## 2024-08-07 RX ORDER — DIPHENHYDRAMINE HYDROCHLORIDE 50 MG/ML
50 INJECTION INTRAMUSCULAR; INTRAVENOUS ONCE
Status: COMPLETED | OUTPATIENT
Start: 2024-08-07 | End: 2024-08-07

## 2024-08-07 RX ADMIN — METOCLOPRAMIDE 10 MG: 5 INJECTION, SOLUTION INTRAMUSCULAR; INTRAVENOUS at 20:20

## 2024-08-07 RX ADMIN — SODIUM CHLORIDE 1000 ML: 9 INJECTION, SOLUTION INTRAVENOUS at 20:21

## 2024-08-07 RX ADMIN — KETOROLAC TROMETHAMINE 30 MG: 30 INJECTION, SOLUTION INTRAMUSCULAR at 20:20

## 2024-08-07 RX ADMIN — DIPHENHYDRAMINE HYDROCHLORIDE 50 MG: 50 INJECTION INTRAMUSCULAR; INTRAVENOUS at 20:18

## 2024-08-07 ASSESSMENT — PAIN SCALES - GENERAL: PAINLEVEL_OUTOF10: 9

## 2024-08-07 ASSESSMENT — PAIN DESCRIPTION - DESCRIPTORS: DESCRIPTORS: SHARP;SHOOTING;THROBBING

## 2024-08-07 ASSESSMENT — ENCOUNTER SYMPTOMS: PHOTOPHOBIA: 1

## 2024-08-07 ASSESSMENT — PAIN - FUNCTIONAL ASSESSMENT: PAIN_FUNCTIONAL_ASSESSMENT: 0-10

## 2024-08-07 ASSESSMENT — PAIN DESCRIPTION - LOCATION: LOCATION: HEAD

## 2024-08-08 NOTE — ED PROVIDER NOTES
[facility]  EMERGENCY DEPARTMENT ENCOUNTER      Pt Name: Jose Denson  MRN: [id number]  Birthdate 1985  Date of evaluation: 8/7/2024  Provider: Katerin Eubanks PA-C    CHIEF COMPLAINT       Chief Complaint   Patient presents with    Headache     Patient with chronic migraines, reports this has been going on for 3 days and reports his prescribed medications are not helping         HISTORY OF PRESENT ILLNESS      Jose Denson is a 39 y.o. male who presents to the emergency department due to migraine.  Patient has extensive history of migraine headaches.  He has now had 1 for the last 3 days.  He is unable to get rid of the headache at home with his migraine medicine.  It started in 1 hemisphere but now involves his entire head.  It is typical for his migraine except for the length of it.  He is sensitive to light and sound and feels nauseous.        REVIEW OF SYSTEMS       Review of Systems   Constitutional:  Negative for fever.   Eyes:  Positive for photophobia. Negative for visual disturbance.   Neurological:  Positive for headaches. Negative for speech difficulty, weakness and numbness.         PAST MEDICAL HISTORY     Past Medical History:   Diagnosis Date    Chronic kidney disease          SURGICAL HISTORY       History reviewed. No pertinent surgical history.      CURRENT MEDICATIONS       Previous Medications    BUPROPION (WELLBUTRIN XL) 150 MG EXTENDED RELEASE TABLET    TAKE 1 TABLET BY MOUTH DAILY    DICLOFENAC SODIUM (VOLTAREN) 1 % GEL    APPLY 4 GRAMS TO SKIN FOUR TIMES A DAY AS NEEDED TO AFFECTED JOINT(S) AS DIRECTED FOR PAIN (USE ENCLOSED DOSING CARD TO ACCURATELY MEASURE EACH DOSE)    DULOXETINE (CYMBALTA) 30 MG EXTENDED RELEASE CAPSULE    1 capsule    DULOXETINE (CYMBALTA) 60 MG EXTENDED RELEASE CAPSULE    120 mg    METHOCARBAMOL (ROBAXIN) 500 MG TABLET    1.5 tablets Orally every 4 hrs for 30 day(s)    NAPROXEN (NAPROSYN) 500 MG TABLET    TAKE ONE TABLET BY MOUTH TWICE A DAY WITH

## 2025-02-03 ENCOUNTER — HOSPITAL ENCOUNTER (EMERGENCY)
Age: 40
Discharge: HOME | End: 2025-02-03
Attending: EMERGENCY MEDICINE
Payer: OTHER GOVERNMENT

## 2025-02-03 VITALS
TEMPERATURE: 98.2 F | HEART RATE: 84 BPM | OXYGEN SATURATION: 95 % | RESPIRATION RATE: 19 BRPM | SYSTOLIC BLOOD PRESSURE: 141 MMHG | DIASTOLIC BLOOD PRESSURE: 93 MMHG

## 2025-02-03 DIAGNOSIS — G43.909 MIGRAINE WITHOUT STATUS MIGRAINOSUS, NOT INTRACTABLE, UNSPECIFIED MIGRAINE TYPE: Primary | ICD-10-CM

## 2025-02-03 PROCEDURE — 99284 EMERGENCY DEPT VISIT MOD MDM: CPT

## 2025-02-03 PROCEDURE — 2580000003 HC RX 258: Performed by: EMERGENCY MEDICINE

## 2025-02-03 PROCEDURE — 96374 THER/PROPH/DIAG INJ IV PUSH: CPT

## 2025-02-03 PROCEDURE — 96376 TX/PRO/DX INJ SAME DRUG ADON: CPT

## 2025-02-03 PROCEDURE — 6360000002 HC RX W HCPCS: Performed by: EMERGENCY MEDICINE

## 2025-02-03 PROCEDURE — 96375 TX/PRO/DX INJ NEW DRUG ADDON: CPT

## 2025-02-03 RX ORDER — METOCLOPRAMIDE HYDROCHLORIDE 5 MG/ML
10 INJECTION INTRAMUSCULAR; INTRAVENOUS ONCE
Status: COMPLETED | OUTPATIENT
Start: 2025-02-03 | End: 2025-02-03

## 2025-02-03 RX ORDER — KETOROLAC TROMETHAMINE 15 MG/ML
15 INJECTION, SOLUTION INTRAMUSCULAR; INTRAVENOUS ONCE
Status: COMPLETED | OUTPATIENT
Start: 2025-02-03 | End: 2025-02-03

## 2025-02-03 RX ORDER — 0.9 % SODIUM CHLORIDE 0.9 %
1000 INTRAVENOUS SOLUTION INTRAVENOUS ONCE
Status: COMPLETED | OUTPATIENT
Start: 2025-02-03 | End: 2025-02-03

## 2025-02-03 RX ORDER — DIPHENHYDRAMINE HYDROCHLORIDE 50 MG/ML
50 INJECTION INTRAMUSCULAR; INTRAVENOUS ONCE
Status: COMPLETED | OUTPATIENT
Start: 2025-02-03 | End: 2025-02-03

## 2025-02-03 RX ORDER — DEXAMETHASONE SODIUM PHOSPHATE 10 MG/ML
10 INJECTION INTRAMUSCULAR; INTRAVENOUS ONCE
Status: COMPLETED | OUTPATIENT
Start: 2025-02-03 | End: 2025-02-03

## 2025-02-03 RX ADMIN — DIPHENHYDRAMINE HYDROCHLORIDE 50 MG: 50 INJECTION INTRAMUSCULAR; INTRAVENOUS at 08:04

## 2025-02-03 RX ADMIN — METOCLOPRAMIDE 10 MG: 5 INJECTION, SOLUTION INTRAMUSCULAR; INTRAVENOUS at 08:04

## 2025-02-03 RX ADMIN — DEXAMETHASONE SODIUM PHOSPHATE 10 MG: 10 INJECTION INTRAMUSCULAR; INTRAVENOUS at 10:17

## 2025-02-03 RX ADMIN — KETOROLAC TROMETHAMINE 15 MG: 15 INJECTION, SOLUTION INTRAMUSCULAR; INTRAVENOUS at 10:17

## 2025-02-03 RX ADMIN — KETOROLAC TROMETHAMINE 15 MG: 15 INJECTION, SOLUTION INTRAMUSCULAR; INTRAVENOUS at 08:04

## 2025-02-03 RX ADMIN — SODIUM CHLORIDE 1000 ML: 9 INJECTION, SOLUTION INTRAVENOUS at 08:03

## 2025-02-03 ASSESSMENT — PAIN - FUNCTIONAL ASSESSMENT: PAIN_FUNCTIONAL_ASSESSMENT: 0-10

## 2025-02-03 ASSESSMENT — PAIN SCALES - GENERAL
PAINLEVEL_OUTOF10: 9
PAINLEVEL_OUTOF10: 9

## 2025-02-03 ASSESSMENT — PAIN DESCRIPTION - LOCATION
LOCATION: HEAD
LOCATION: HEAD

## 2025-02-03 ASSESSMENT — PAIN DESCRIPTION - ORIENTATION: ORIENTATION: ANTERIOR

## 2025-02-03 NOTE — ED PROVIDER NOTES
EMERGENCY DEPARTMENT ENCOUNTER    Pt Name: Jose Denson  MRN: [id number]  Birthdate 1985  Date of evaluation: 2/3/25  CHIEF COMPLAINT       Chief Complaint   Patient presents with    Migraine     Patient complains of migraine with onset this past Friday. Patient states took medications as prescribed and tried home remedies with no help.     HISTORY OF PRESENT ILLNESS   This is a 39-year-old male who presents with complaints of migraines.  Patient states that he has a history of chronic migraines, he typically can get his migraines to subside with some home remedies and his Imitrex and other things but every now and then he gets a migraine that is severe enough that he has to come to the hospital.  States on Friday he developed a headache and he has not had any relief.           REVIEW OF SYSTEMS     Review of Systems  PASTMEDICAL HISTORY     Past Medical History:   Diagnosis Date    Chronic kidney disease      Past Problem List  Patient Active Problem List   Diagnosis Code    Chronic left-sided low back pain without sciatica M54.50, G89.29    Migraine without status migrainosus, not intractable G43.909    Severe episode of recurrent major depressive disorder, without psychotic features (HCC) F33.2    Overweight (BMI 25.0-29.9) E66.3    Class 1 obesity due to excess calories without serious comorbidity with body mass index (BMI) of 32.0 to 32.9 in adult E66.811, E66.09, Z68.32    SVT (supraventricular tachycardia) (HCC) I47.10    Severe major depression (HCC) F32.2    Rhabdomyolysis M62.82    Essential hypertension I10    CKD (chronic kidney disease) stage 1, GFR 90 ml/min or greater N18.1    Elevated CK R74.8    Backache M54.9     SURGICAL HISTORY     History reviewed. No pertinent surgical history.  CURRENT MEDICATIONS       Previous Medications    BUPROPION (WELLBUTRIN XL) 150 MG EXTENDED RELEASE TABLET    TAKE 1 TABLET BY MOUTH DAILY    DICLOFENAC SODIUM (VOLTAREN) 1 % GEL    APPLY 4 GRAMS TO SKIN FOUR TIMES

## 2025-03-31 ENCOUNTER — OFFICE VISIT (OUTPATIENT)
Dept: NEUROLOGY | Age: 40
End: 2025-03-31
Payer: MEDICARE

## 2025-03-31 VITALS
HEART RATE: 60 BPM | HEIGHT: 73 IN | RESPIRATION RATE: 20 BRPM | SYSTOLIC BLOOD PRESSURE: 124 MMHG | BODY MASS INDEX: 33.5 KG/M2 | WEIGHT: 252.8 LBS | DIASTOLIC BLOOD PRESSURE: 84 MMHG | OXYGEN SATURATION: 97 % | TEMPERATURE: 96.8 F

## 2025-03-31 DIAGNOSIS — G43.719 INTRACTABLE CHRONIC MIGRAINE WITHOUT AURA AND WITHOUT STATUS MIGRAINOSUS: Primary | ICD-10-CM

## 2025-03-31 PROCEDURE — 1036F TOBACCO NON-USER: CPT | Performed by: NEUROMUSCULOSKELETAL MEDICINE, SPORTS MEDICINE

## 2025-03-31 PROCEDURE — G8427 DOCREV CUR MEDS BY ELIG CLIN: HCPCS | Performed by: NEUROMUSCULOSKELETAL MEDICINE, SPORTS MEDICINE

## 2025-03-31 PROCEDURE — 3074F SYST BP LT 130 MM HG: CPT | Performed by: NEUROMUSCULOSKELETAL MEDICINE, SPORTS MEDICINE

## 2025-03-31 PROCEDURE — 99213 OFFICE O/P EST LOW 20 MIN: CPT

## 2025-03-31 PROCEDURE — 99203 OFFICE O/P NEW LOW 30 MIN: CPT | Performed by: NEUROMUSCULOSKELETAL MEDICINE, SPORTS MEDICINE

## 2025-03-31 PROCEDURE — 3079F DIAST BP 80-89 MM HG: CPT | Performed by: NEUROMUSCULOSKELETAL MEDICINE, SPORTS MEDICINE

## 2025-03-31 PROCEDURE — G8417 CALC BMI ABV UP PARAM F/U: HCPCS | Performed by: NEUROMUSCULOSKELETAL MEDICINE, SPORTS MEDICINE

## 2025-03-31 RX ORDER — TOPIRAMATE 25 MG/1
25 TABLET, FILM COATED ORAL NIGHTLY
Qty: 30 TABLET | Refills: 1 | Status: SHIPPED | OUTPATIENT
Start: 2025-03-31 | End: 2025-04-30

## 2025-03-31 RX ORDER — FEXOFENADINE HCL 180 MG/1
180 TABLET ORAL DAILY
COMMUNITY
Start: 2024-09-25
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
## 2025-03-31 NOTE — PATIENT INSTRUCTIONS
SURVEY:    Thank you for allowing us to care for you today.    You may be receiving a survey from [facility] regarding your visit today- electronically or via mail.      Please help us by completing the survey as this will provide the needed feedback to ensure we are providing the very best care for you and your family.    If you cannot score us a very good on any question, please call the office to discuss how we could have made your experience a very good one.    Thank you.       STAFF:    Millicent Luu, Keeley LOUIS      CLINICAL STAFF:    Miguelina JESSICA, Fransisca LOUIS, Roxie LOUIS, Cherrie JESSICA

## 2025-05-29 ENCOUNTER — OFFICE VISIT (OUTPATIENT)
Dept: NEUROLOGY | Age: 40
End: 2025-05-29
Payer: MEDICARE

## 2025-05-29 VITALS
HEART RATE: 59 BPM | HEIGHT: 73 IN | BODY MASS INDEX: 33.4 KG/M2 | RESPIRATION RATE: 20 BRPM | DIASTOLIC BLOOD PRESSURE: 93 MMHG | WEIGHT: 252 LBS | TEMPERATURE: 96.2 F | SYSTOLIC BLOOD PRESSURE: 131 MMHG

## 2025-05-29 DIAGNOSIS — G43.909 MIGRAINE WITHOUT STATUS MIGRAINOSUS, NOT INTRACTABLE, UNSPECIFIED MIGRAINE TYPE: ICD-10-CM

## 2025-05-29 PROCEDURE — G8427 DOCREV CUR MEDS BY ELIG CLIN: HCPCS | Performed by: NEUROMUSCULOSKELETAL MEDICINE, SPORTS MEDICINE

## 2025-05-29 PROCEDURE — 3080F DIAST BP >= 90 MM HG: CPT | Performed by: NEUROMUSCULOSKELETAL MEDICINE, SPORTS MEDICINE

## 2025-05-29 PROCEDURE — 99213 OFFICE O/P EST LOW 20 MIN: CPT | Performed by: NEUROMUSCULOSKELETAL MEDICINE, SPORTS MEDICINE

## 2025-05-29 PROCEDURE — G8417 CALC BMI ABV UP PARAM F/U: HCPCS | Performed by: NEUROMUSCULOSKELETAL MEDICINE, SPORTS MEDICINE

## 2025-05-29 PROCEDURE — 3075F SYST BP GE 130 - 139MM HG: CPT | Performed by: NEUROMUSCULOSKELETAL MEDICINE, SPORTS MEDICINE

## 2025-05-29 PROCEDURE — 1036F TOBACCO NON-USER: CPT | Performed by: NEUROMUSCULOSKELETAL MEDICINE, SPORTS MEDICINE

## 2025-05-29 RX ORDER — TOPIRAMATE 25 MG/1
50 TABLET, FILM COATED ORAL NIGHTLY
Qty: 60 TABLET | Refills: 2 | Status: SHIPPED | OUTPATIENT
Start: 2025-05-29

## 2025-05-29 RX ORDER — SUMATRIPTAN SUCCINATE 100 MG/1
100 TABLET ORAL PRN
Qty: 9 TABLET | Refills: 2 | Status: SHIPPED | OUTPATIENT
Start: 2025-05-29

## 2025-05-29 NOTE — PROGRESS NOTES
NEUROLOGY follow-up.    Patient Name:  Jose Denson  :   1985  Clinic Visit Date: 2025    I saw Mr. Jose Denson  in the neurology clinic today for follow up of migraine headaches.     39-year-old right-handed gentleman with a history of anxiety, depression, and chronic headaches diagnosed as migraine headaches  Headache have improved after adding topiramate to propranolol..    REVIEW OF SYSTEMS    Constitutional Weight changes: absent, change in appetite: absent Fatigue: absent;Fevers : absent, Any recent hospitalizations:  absent   HEENT Ears: normal,  Visual disturbance: absent   Respiratory Shortness of breath: absent, choking:  absent, Cough: absent, Snoring : absent   Cardiovascular Chest pain: absent, Leg swelling :absent, palpitations : absent, fainting : absent   GI Constipation: absent, Diarrhea: absent, Swallowing change: absent    Urinary frequency: absent, Urinary urgency: absent, Urinary incontinence: absent   Musculoskeletal Neck pain: present, Back pain: present, Stiffness: absent, Muscle pain: absent, Joint pain: absent, restless leg : absent   Dermatological Hair loss: absent, Skin changes: absent   Neurological Confusion: absent, Trouble concentrating: absent, Seizures: absent;  Memory loss: absent, balance problem: absent, Dizziness: absent, vertigo: absent, Weakness: absent, Numbness absent, Tremor: absent, Spasm: absent, involuntary movement: absent, Speech difficulty: absent, Headache: present, Light sensitivity: absent   Psychiatric Anxiety: present, Depression  present, drug abuse: absent, Hallucination: absent, mood disorder: absent, Suicidal ideations absent   Hematologic Abnormal bleeding: absent, Anemia: absent, Lymph gland changes: absent Clotting disorder: absent     Past Medical History:   Diagnosis Date    Anxiety     Chronic kidney disease     PATIENT UNSURE    Chronic pain     H/O cardiac radiofrequency ablation     Major depressive disorder     Migraines

## 2025-05-29 NOTE — PATIENT INSTRUCTIONS
SURVEY:    Thank you for allowing us to care for you today.    You may be receiving a survey from [facility] regarding your visit today- electronically or via mail.      Please help us by completing the survey as this will provide the needed feedback to ensure we are providing the very best care for you and your family.    If you cannot score us a very good on any question, please call the office to discuss how we could have made your experience a very good one.    Thank you.       STAFF:    Millicent Luu, Keeley LOUIS      CLINICAL STAFF:    Miguelina JESSICA, Fransisca LOUIS, Roxie LOUIS, Cherrie JESSICA